# Patient Record
Sex: MALE | Race: WHITE | Employment: FULL TIME | URBAN - METROPOLITAN AREA
[De-identification: names, ages, dates, MRNs, and addresses within clinical notes are randomized per-mention and may not be internally consistent; named-entity substitution may affect disease eponyms.]

---

## 2017-08-18 LAB
CHOLESTEROL, TOTAL: NORMAL MG/DL
CHOLESTEROL/HDL RATIO: NORMAL
HDLC SERPL-MCNC: NORMAL MG/DL (ref 35–70)
LDL CHOLESTEROL CALCULATED: NORMAL MG/DL (ref 0–160)
TRIGL SERPL-MCNC: NORMAL MG/DL
VLDLC SERPL CALC-MCNC: NORMAL MG/DL

## 2019-05-08 ENCOUNTER — OFFICE VISIT (OUTPATIENT)
Dept: INTERNAL MEDICINE CLINIC | Age: 49
End: 2019-05-08
Payer: COMMERCIAL

## 2019-05-08 VITALS
HEIGHT: 63 IN | BODY MASS INDEX: 24.7 KG/M2 | RESPIRATION RATE: 14 BRPM | SYSTOLIC BLOOD PRESSURE: 120 MMHG | DIASTOLIC BLOOD PRESSURE: 80 MMHG | WEIGHT: 139.4 LBS | HEART RATE: 80 BPM

## 2019-05-08 DIAGNOSIS — M21.41 PES PLANUS OF BOTH FEET: ICD-10-CM

## 2019-05-08 DIAGNOSIS — F41.9 ANXIETY: ICD-10-CM

## 2019-05-08 DIAGNOSIS — J30.1 NON-SEASONAL ALLERGIC RHINITIS DUE TO POLLEN: ICD-10-CM

## 2019-05-08 DIAGNOSIS — Z13.31 POSITIVE DEPRESSION SCREENING: ICD-10-CM

## 2019-05-08 DIAGNOSIS — G50.0 TRIGEMINAL NEURALGIA OF LEFT SIDE OF FACE: Primary | ICD-10-CM

## 2019-05-08 DIAGNOSIS — D22.9 ATYPICAL NEVUS: ICD-10-CM

## 2019-05-08 DIAGNOSIS — R73.9 HYPERGLYCEMIA: ICD-10-CM

## 2019-05-08 DIAGNOSIS — Z11.4 SCREENING FOR HIV WITHOUT PRESENCE OF RISK FACTORS: ICD-10-CM

## 2019-05-08 DIAGNOSIS — M21.42 PES PLANUS OF BOTH FEET: ICD-10-CM

## 2019-05-08 DIAGNOSIS — F32.0 CURRENT MILD EPISODE OF MAJOR DEPRESSIVE DISORDER, UNSPECIFIED WHETHER RECURRENT (HCC): ICD-10-CM

## 2019-05-08 PROCEDURE — G0444 DEPRESSION SCREEN ANNUAL: HCPCS | Performed by: INTERNAL MEDICINE

## 2019-05-08 PROCEDURE — 99215 OFFICE O/P EST HI 40 MIN: CPT | Performed by: INTERNAL MEDICINE

## 2019-05-08 PROCEDURE — G8427 DOCREV CUR MEDS BY ELIG CLIN: HCPCS | Performed by: INTERNAL MEDICINE

## 2019-05-08 PROCEDURE — G8431 POS CLIN DEPRES SCRN F/U DOC: HCPCS | Performed by: INTERNAL MEDICINE

## 2019-05-08 PROCEDURE — G8420 CALC BMI NORM PARAMETERS: HCPCS | Performed by: INTERNAL MEDICINE

## 2019-05-08 PROCEDURE — 1036F TOBACCO NON-USER: CPT | Performed by: INTERNAL MEDICINE

## 2019-05-08 RX ORDER — FLUTICASONE PROPIONATE 50 MCG
2 SPRAY, SUSPENSION (ML) NASAL DAILY
Qty: 1 BOTTLE | Refills: 11 | Status: SHIPPED | OUTPATIENT
Start: 2019-05-08 | End: 2019-06-19

## 2019-05-08 SDOH — HEALTH STABILITY: MENTAL HEALTH: HOW OFTEN DO YOU HAVE A DRINK CONTAINING ALCOHOL?: 2-4 TIMES A MONTH

## 2019-05-08 ASSESSMENT — ENCOUNTER SYMPTOMS
BACK PAIN: 1
SHORTNESS OF BREATH: 0
DIARRHEA: 0
COUGH: 0
NAUSEA: 0
SINUS PRESSURE: 1
RHINORRHEA: 0
SORE THROAT: 0
TROUBLE SWALLOWING: 0
ABDOMINAL PAIN: 0

## 2019-05-08 ASSESSMENT — PATIENT HEALTH QUESTIONNAIRE - PHQ9
7. TROUBLE CONCENTRATING ON THINGS, SUCH AS READING THE NEWSPAPER OR WATCHING TELEVISION: 1
1. LITTLE INTEREST OR PLEASURE IN DOING THINGS: 3
SUM OF ALL RESPONSES TO PHQ QUESTIONS 1-9: 13
3. TROUBLE FALLING OR STAYING ASLEEP: 2
4. FEELING TIRED OR HAVING LITTLE ENERGY: 3
SUM OF ALL RESPONSES TO PHQ9 QUESTIONS 1 & 2: 6
SUM OF ALL RESPONSES TO PHQ QUESTIONS 1-9: 13
8. MOVING OR SPEAKING SO SLOWLY THAT OTHER PEOPLE COULD HAVE NOTICED. OR THE OPPOSITE, BEING SO FIGETY OR RESTLESS THAT YOU HAVE BEEN MOVING AROUND A LOT MORE THAN USUAL: 0
10. IF YOU CHECKED OFF ANY PROBLEMS, HOW DIFFICULT HAVE THESE PROBLEMS MADE IT FOR YOU TO DO YOUR WORK, TAKE CARE OF THINGS AT HOME, OR GET ALONG WITH OTHER PEOPLE: 1
9. THOUGHTS THAT YOU WOULD BE BETTER OFF DEAD, OR OF HURTING YOURSELF: 0
5. POOR APPETITE OR OVEREATING: 0
6. FEELING BAD ABOUT YOURSELF - OR THAT YOU ARE A FAILURE OR HAVE LET YOURSELF OR YOUR FAMILY DOWN: 1
2. FEELING DOWN, DEPRESSED OR HOPELESS: 3

## 2019-05-08 NOTE — PROGRESS NOTES
[address] [address]  Dept: [phone]  Dept Fax: [phone]  Loc: [phone]     2019    Zeke Lord (:  1970) is a 53 y.o. male, here for evaluation of the following medical concerns:    Chief Complaint   Patient presents with   Lul[facility] Goodson Doctor       HPI     Changing from Dr. Jacquie Dimas due to insurance reasons. Continues with depressed mood and anxiety. See PHQ9. Job situation is somewhat unstable. 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 Feels like \"his life is not his own. \" Lives for others like his elderly parents. Siblings out of town count on him to do this. Lives alone, not lots of social connections except a few close friends. Took antidepressant medication 15 years ago. Does not think it will help him but open to talk therapy and possible medication in the future. Had \"prostate issues\" but doing better now. But he is not sure of next steps. Saw Urology group- Dr Chavez Grade. Right lower cheek numbness for long time that comes and goes. Worse w stress. Had negative evaluation with Brain and C spine MRI but symptoms persist.    HO melanoma in situ left flank. Has some newish moles he wants evaluated by Dermatology. Also numbness plantar surface both great toes. Worse with certain shoes. Notes back pain when wears his dress shoes. Review of Systems   Constitutional: Negative for fatigue and fever. HENT: Positive for congestion and sinus pressure (sinus pressure in cheeks). Negative for rhinorrhea, sore throat and trouble swallowing. Eyes: Negative for visual disturbance. Respiratory: Negative for cough and shortness of breath. Cardiovascular: Negative for chest pain and palpitations. Gastrointestinal: Negative for abdominal pain, diarrhea and nausea. Genitourinary: Negative for decreased urine volume, dysuria and frequency. External ear normal.   Left Ear: External ear normal.   Nose: Nose normal.   Mouth/Throat: Oropharynx is clear and moist.   Eyes: Pupils are equal, round, and reactive to light. EOM are normal. No scleral icterus. Neck: Normal range of motion. No thyromegaly present. Cardiovascular: Normal rate, regular rhythm and intact distal pulses. No murmur heard. Pulmonary/Chest: Breath sounds normal. No respiratory distress. He has no wheezes. He has no rales. Abdominal: Soft. Bowel sounds are normal. He exhibits no distension. There is no tenderness. Musculoskeletal: Normal range of motion. He exhibits no edema or deformity. Flat feet  No foot lesions noted of great toes     Lymphadenopathy:     He has no cervical adenopathy. Neurological: He is alert and oriented to person, place, and time. A cranial nerve deficit (reports intermittent reduced sensation right lower cheek) is present. No sensory deficit. Coordination normal.   Skin: Skin is warm and dry. Several brown nevi, some raised and irregular borders   Psychiatric: He has a normal mood and affect. Thought content normal.   Nursing note and vitals reviewed. ASSESSMENT/PLAN:  1. Current mild episode of major depressive disorder, unspecified whether recurrent [facility])  Patient agrees to see AV office psychologist. He will let me know if not improving and consider medication at that time. 2. Positive depression screening    - Positive Screen for Clinical Depression with a Documented Follow-up Plan     3. Anxiety  He will try to manage this with exercise and speaking to his sisters about helping care for elderly parents. 4. Trigeminal neuralgia of left side of face  MRI's of c spine and brain reviewed, without clinically relevant findings. Refer to - Robert More MD, Otolaryngology, [facility] to evaluate for possible trigeminal neuralgia as patient continues to be symptomatic. 5. Atypical nevus  - [facility] Dermatology    6. Hyperglycemia    - Hemoglobin A1C; Future    7. Screening for HIV without presence of risk factors    - HIV Screen; Future    8. Non-seasonal allergic rhinitis due to pollen  Patient can try otc loratadine and   - fluticasone (FLONASE) 50 MCG/ACT nasal spray; 2 sprays by Nasal route daily  Dispense: 1 Bottle; Refill: 11    9. Pes planus of both feet  Suspect this contributes to his toe numbness and back pain. - Kat Hidalgo DPM, Podiatry, [facility]    Return in about 1 year (around 5/8/2020) for routine. minutes spent with patient- >50 % continuity of care and/or counseling. On the basis of positive PHQ-9 screening (PHQ-9 Total Score: 13), the following plan was implemented: referral for psychotherapy provided to address external stressors. Patient will follow-up in as needed depending on response to therapy. Aishwarya Case

## 2019-05-08 NOTE — PATIENT INSTRUCTIONS
Call Urology group to see what follow up plan is.     Dermatology   ENT    OTC Loratadine for allergies and Flonase    Make appointment with [address] [place] [address]

## 2019-05-13 ENCOUNTER — OFFICE VISIT (OUTPATIENT)
Dept: ORTHOPEDIC SURGERY | Age: 49
End: 2019-05-13
Payer: COMMERCIAL

## 2019-05-13 VITALS
DIASTOLIC BLOOD PRESSURE: 73 MMHG | HEIGHT: 63 IN | BODY MASS INDEX: 24.69 KG/M2 | WEIGHT: 139.33 LBS | SYSTOLIC BLOOD PRESSURE: 129 MMHG | HEART RATE: 77 BPM

## 2019-05-13 DIAGNOSIS — R20.0 NUMBNESS AND TINGLING OF FOOT: Primary | ICD-10-CM

## 2019-05-13 DIAGNOSIS — R20.2 NUMBNESS AND TINGLING OF FOOT: Primary | ICD-10-CM

## 2019-05-13 PROCEDURE — G8427 DOCREV CUR MEDS BY ELIG CLIN: HCPCS | Performed by: PODIATRIST

## 2019-05-13 PROCEDURE — 99203 OFFICE O/P NEW LOW 30 MIN: CPT | Performed by: PODIATRIST

## 2019-05-13 PROCEDURE — G8420 CALC BMI NORM PARAMETERS: HCPCS | Performed by: PODIATRIST

## 2019-05-13 PROCEDURE — 1036F TOBACCO NON-USER: CPT | Performed by: PODIATRIST

## 2019-05-13 NOTE — PROGRESS NOTES
HISTORY OF PRESENT ILLNESS: This is an initial visit for a 81-year-old male who presents with the chief complaint of occasional numbness to the right and left great toes. This happens sporadically and seems to be resolved with simple rest.  It seems to occur more when he is on his feet more. He does complain of lower back discomfort and occasional numbness higher up in his leg. FAMILY HISTORY: Documented in chart. SOCIAL HISTORY: Documented in chart. REVIEW OF SYSTEMS:  The patient denies any issues with dermatologic, pulmonary, cardiovascular, genitourinary, hematologic, gastrointestinal, neurologic, psychiatric, and HEENT systems. Family History, Social History, and Review of Systems were reviewed from patient history form dated on 5/13/2019 and available in the patient's chart under the MEDIA tab. PHYSICAL EXAMINATION:     The patient is alert and orientated x3. There is no erythema or ecchymosis or edema bilateral.  No foot ulcerations or pre-ulcerative regions are noted. He has readily palpable DP and PT pulses bilateral.    His sensation is intact to 5.07 g monofilament and multiple plantar sites of both feet. There is no evidence of muscle weakness especially with dorsiflexion at the ankle bilateral.    The remainder of the exam is unremarkable. RADIOGRAPHS: None taken      ASSESSMENT: Radiculopathy      PLAN: The patient was educated on the pathology and its treatment options. I suspect that his symptoms are actually coming from his back. Unfortunately, there is no exact can offer him to resolve the sporadic numbness. I reassured him that is most likely a sensory neuropathy.   I recommended he seek an opinion from one of our lower back pain specialist.

## 2019-05-14 ENCOUNTER — TELEPHONE (OUTPATIENT)
Dept: INTERNAL MEDICINE CLINIC | Age: 49
End: 2019-05-14

## 2019-05-14 DIAGNOSIS — Z13.220 SCREENING, LIPID: Primary | ICD-10-CM

## 2019-05-14 NOTE — TELEPHONE ENCOUNTER
Patient states his my chart account states he is due for a Cholesterol Screening. Patient just established care with Dr. Migel Whitmna 05-08, and is requesting an order please be generated for a cholesterol screening. Please verify with patient @ phone # provided.

## 2019-05-15 NOTE — TELEPHONE ENCOUNTER
Tell patient I pulled in non [facility] results done 8/17 so not really \"due\" yet. If still wants it done, see order. Last labs were okay.     Chol/HDL Ratio 4.2 0 - 5    Cholesterol 190   Comment:  TOTAL CHOLESTEROL INTERPRETATION:  Less than 200 mg/dL Desireable  200-239 mg/dL Borderline  Greater or Equal to 240 mg/dL High 125 - 199 mg/dL   LDL Calculated 123 (H)   Comment:  LDL CHOLESTEROL INTERPRETATION:    Less than 100 mg/dL Optimal  100-129 mg/dL Near optimal/above optimal  130-159 mg/dL Borderline High  160-189 mg/dL High  Greater or Equal to 190 mg/dL Very High 0 - 100 mg/dL   HDL 45   Comment:  HDL CHOLESTEROL INTERPRETATION:    Less than 40 mg/dL Low  Greater than 60 mg/dL Desirable 40 - 180 mg/dL   Triglycerides 109   Comment:  TOTAL TRIGLYCERIDE INTERPRETATION:    Less than 150 mg/dL Normal  150-199 mg/dL Borderline HIgh  200-499 mg/dL High  Greater or Equal to 500 mg/dL Very High 0 - 150 mg/dL   Specimen Collected on   PLASMA 8/18/2017 7:49 AM

## 2019-05-15 NOTE — TELEPHONE ENCOUNTER
Discussed with patient and understood he not \"due\" until 2022. Did advise lab order is in chart if he decides he would like to have them redrawn.

## 2019-05-16 ENCOUNTER — OFFICE VISIT (OUTPATIENT)
Dept: ORTHOPEDIC SURGERY | Age: 49
End: 2019-05-16
Payer: COMMERCIAL

## 2019-05-16 VITALS — HEIGHT: 63 IN | BODY MASS INDEX: 24.69 KG/M2 | WEIGHT: 139.33 LBS

## 2019-05-16 VITALS — BODY MASS INDEX: 24.69 KG/M2 | WEIGHT: 139.33 LBS | HEIGHT: 63 IN

## 2019-05-16 DIAGNOSIS — M79.642 PAIN IN BOTH HANDS: ICD-10-CM

## 2019-05-16 DIAGNOSIS — M79.641 PAIN IN BOTH HANDS: ICD-10-CM

## 2019-05-16 DIAGNOSIS — M54.50 PAIN OF LUMBAR SPINE: ICD-10-CM

## 2019-05-16 DIAGNOSIS — M79.601 PAIN IN BOTH UPPER EXTREMITIES: Primary | ICD-10-CM

## 2019-05-16 DIAGNOSIS — M51.36 DEGENERATIVE DISC DISEASE, LUMBAR: Primary | ICD-10-CM

## 2019-05-16 DIAGNOSIS — M79.602 PAIN IN BOTH UPPER EXTREMITIES: Primary | ICD-10-CM

## 2019-05-16 DIAGNOSIS — M47.816 SPONDYLOSIS WITHOUT MYELOPATHY OR RADICULOPATHY, LUMBAR REGION: ICD-10-CM

## 2019-05-16 DIAGNOSIS — M79.645 FINGER PAIN, LEFT: Primary | ICD-10-CM

## 2019-05-16 DIAGNOSIS — R20.2 NUMBNESS AND TINGLING: ICD-10-CM

## 2019-05-16 DIAGNOSIS — R20.0 NUMBNESS AND TINGLING: ICD-10-CM

## 2019-05-16 PROCEDURE — G8420 CALC BMI NORM PARAMETERS: HCPCS | Performed by: PHYSICAL MEDICINE & REHABILITATION

## 2019-05-16 PROCEDURE — G8420 CALC BMI NORM PARAMETERS: HCPCS | Performed by: ORTHOPAEDIC SURGERY

## 2019-05-16 PROCEDURE — 95912 NRV CNDJ TEST 11-12 STUDIES: CPT | Performed by: PHYSICAL MEDICINE & REHABILITATION

## 2019-05-16 PROCEDURE — 99243 OFF/OP CNSLTJ NEW/EST LOW 30: CPT | Performed by: PHYSICAL MEDICINE & REHABILITATION

## 2019-05-16 PROCEDURE — 95886 MUSC TEST DONE W/N TEST COMP: CPT | Performed by: PHYSICAL MEDICINE & REHABILITATION

## 2019-05-16 PROCEDURE — 99203 OFFICE O/P NEW LOW 30 MIN: CPT | Performed by: ORTHOPAEDIC SURGERY

## 2019-05-16 PROCEDURE — G8427 DOCREV CUR MEDS BY ELIG CLIN: HCPCS | Performed by: ORTHOPAEDIC SURGERY

## 2019-05-16 PROCEDURE — G8427 DOCREV CUR MEDS BY ELIG CLIN: HCPCS | Performed by: PHYSICAL MEDICINE & REHABILITATION

## 2019-05-16 PROCEDURE — 1036F TOBACCO NON-USER: CPT | Performed by: ORTHOPAEDIC SURGERY

## 2019-05-16 NOTE — PROGRESS NOTES
weakness:  no   Difficulty walking:  no    Recent Imaging (within past one year)   Xrays: no   MRI or CT of spine: no    Current/Past Treatment:   · Physical Therapy:  none  · Chiropractic:  none  · Injection:  none  · Medications:   NSAIDS:  yes Tylenol or Ibuprofen    Muscle relaxer:  none   Steriods:  none   Neuropathic medications:  none   Opioids:  none  · Previous surgery:  no  · Previous surgical consult:  no  · Other:  · Infection control  · Tested positive for MRSA in past 12 months:  no  · Tested positive for MSSA \"staph infection\" in past 12 months: no  · Tested positive for VRE (Vancomycin Resistant Enterococci) in past 12 months:   no  · Currently on any antibiotics for an infection: no  · Anticoagulants:  · On a blood thinner:  no   · Any history of bleeding disorder: no   · MRI Contraindication: no   · Previous Pain Management: no                   Past Medical History:   Past Medical History:   Diagnosis Date    Anxiety     Melanoma in situ ([facility] [address]     left flank    Prostate calculus     Saw Urology group- Dr. Ronald Babinski? - had mri 1/19 and \"scope\" in 9/18      Past Surgical History:     Past Surgical History:   Procedure Laterality Date    CYSTOSCOPY  2018     Current Medications:     Current Outpatient Medications:     fluticasone (FLONASE) 50 MCG/ACT nasal spray, 2 sprays by Nasal route daily, Disp: 1 Bottle, Rfl: 11  Allergies:  Codeine and Penicillins  Social History:    reports that he has never smoked. He has never used smokeless tobacco. He reports that he drinks alcohol. Family History:   Family History   Problem Relation Age of Onset    Scoliosis Mother     Hypertension Father     Breast Cancer Sister     Diabetes type 2  Paternal Grandfather          REVIEW OF SYSTEMS: Full ROS reviewed & scanned from 5/16/2019           PHYSICAL EXAM:    Vitals: Height 5' 3.27\" (1.607 m), weight 139 lb 5.3 oz (63.2 kg).     GENERAL EXAM:  · General Apparence: Patient is adequately groomed with no feet.  · Gait & station: normal, patient ambulates without assistance  · Additional Examinations:  · RIGHT LOWER EXTREMITY: Inspection/examination of the right lower extremity does not show any tenderness, deformity or injury. Range of motion is unremarkable. There is no gross instability. There are no rashes, ulcerations or lesions. Strength and tone are normal. No atrophy or abnormal movements are noted. · LEFT LOWER EXTREMITY:  Inspection/examination of the left lower extremity does not show any tenderness, deformity or injury. Range of motion is unremarkable. There is no gross instability. There are no rashes, ulcerations or lesions. Strength and tone are normal. No atrophy or abnormal movements are noted. Diagnostic Testing:    Xrays:   AP and lateral of the lumbar spine taken today in the office show mild L5-S1 degenerative disc disease and a transitional segment which is labeled S1  MRI or CT:  None  EMG:  None  Results for orders placed or performed in visit on 05/14/19   Lipid Panel   Result Value Ref Range    Cholesterol, Total  mg/dL    HDL  35 - 70 mg/dL    LDL Calculated  0 - 160 mg/dL    Triglycerides  mg/dL    Chol/HDL Ratio      VLDL  mg/dL       Impression (Medical Decision Making):       1. Degenerative disc disease, lumbar    2. Spondylosis without myelopathy or radiculopathy, lumbar region    3. Pain of lumbar spine        Plan (Medical Decision Making):    I discussed the diagnosis and the treatment options with Hema Roach today. In Summary:  The various treatment options were outlined and discussed with Hema Roach including:  Conservative care options: physical therapy, ice, medications, bracing, and activity modification. The indications for therapeutic injections. The indications for additional imaging/laboratory studies. The indications for (possible future) interventions.      After considering the various options discussed, Hema Roach elected to pursue a course of treatment that includes the followin. Medications: Continue anti-inflammatories with appropriate GI Precautions including to stop if develop dark tarry stools or GI upset and to take with food. 2. PT:  I will start the patient on a trial of PT to work on a lumbar stabilization program to focus on core strengthening, core stabilizing, lumbar stretches, hamstring flexibility, modalities as indicated for 6-8 visits over the next 4-6 weeks. 3. Further studies: MR if no benefit from PT      4. Interventional:  At this point, no interventional options are recommended. 5. Healthy Lifestyle Measures:  Patient education material reviewing the following was distributed to [employer]  Anatomic drawings  Healthy lifestyle education  Osteoporosis prevention,   Back and neck pain educational information   Advanced imaging preparedness    Posture education   Proper lifting and carrying techniques,   Weight management  Quitting smoking and   Minor ways to treat back pain  For further information regarding the spine conditions and to review interventional treatments the patient was directed to [employer].    6.  Follow up:  4-6 weeks    [employer] was instructed to call the office if his symptoms worsen or if new symptoms appear prior to the next scheduled visit. He is specifically instructed to contact the office between now & his scheduled appointment if he has concerns related to his condition or if he needs assistance in scheduling the above tests. He is welcome to call for an appointment sooner if he has any additional concerns or questions. Hossein Nielson. Moe Nice MD, [place], [facility]  Board Certified in [address] [place] [address] Certified and Fellowship Trained in [facility])     This dictation was performed with a verbal recognition program [facility]) and it was checked for errors.  It is possible that there are still dictated errors within this office note. If so, please bring any errors to my attention for an addendum. All efforts were made to ensure that this office note is accurate.

## 2019-05-16 NOTE — PROGRESS NOTES
[address]  MEDICINE      Patient: Melisa Bravo Age: 53 y   Sex: Male Date: 5/16/2019   YOB: 1970 Ref. Phys: Mateusz Shah MD   Notes:         Patient History: Mr Rosita Briggs presents with paresthesia in the left arm and cold intolerance and numbness in the left middle and ring fingers. Motor Nerve Conduction:    Nerve and Site Onset Latency Amplitude Distance Conduction  Velocity   Median. L       Wrist 3.5 ms 11.6 mV 80 mm  m/s   Elbow 8.2 ms 11.7 mV 260 mm 55 m/s     Ulnar. L       Wrist 2.7 ms 13.1 mV 80 mm  m/s   Below elbow 6.0 ms 11.5 mV 200 mm 61 m/s   Above elbow 8.4 ms 11.4 mV 110 mm 46 m/s     Median. R       Wrist 3.6 ms 11.8 mV 80 mm  m/s   Elbow 7.8 ms 10.7 mV 240 mm 57 m/s     Ulnar. R       Wrist 2.8 ms 12.2 mV 80 mm  m/s   Below elbow 6.3 ms 11.0 mV 200 mm 57 m/s   Above elbow 8.7 ms 10.2 mV 130 mm 54 m/s           Sensory Nerve Conduction:    Nerve and Site Peak  Latency Peak  Amplitude Distance Conduction  Velocity   Median. L       Wrist (Median) 1.9 ms 152 mV 80 mm 57 m/s     Ulnar. L       Wrist (Ulnar) 1.5 ms 28 mV  mm  m/s     ms  mV 80 mm 80 m/s     Radial.L       Forearm 2.1 ms 33 mV 100 mm 77 m/s     Median. R       Wrist (Median) 1.8 ms 121 mV 80 mm 62 m/s     Ulnar. R       Wrist (Ulnar) 1.3 ms 28 mV  mm  m/s     ms  mV 80 mm 87 m/s     Radial.R       Forearm 2.1 ms 37 mV 100 mm 63 m/s         Needle EMG Examination:     Insertional Spontaneous Activity Volitional MUAPs   Muscle Insertional Fibs +Wave Fasc Duration Amplitude Poly Config Recruitment Pattern   Deltoid. L Normal None None None Normal Normal None Normal Normal Full   Biceps Brachii. L Normal None None None Normal Normal None Normal Normal Full   Triceps. L Normal None None None Normal Normal None Normal Normal Full   Pronator Teres. L Normal None None None Normal Normal None Normal Normal Full   FDI. L Normal None None None Normal Normal None Normal Reduced Full Cervical paraspinals - Lower. L Normal None None None           Nerve Conduction Studies - Left Upper Extremity  The median motor nerve conduction study shows normal latency, normal amplitude and normal conduction velocity. The ulnar motor nerve conduction study shows normal latency, normal amplitude and normal conduction velocity distally with a 15 m/s drop across the elbow segment. The orthodromic median sensory NCS shows normal peak latency and amplitude. The orthodromic ulnar sensory NCS shows normal peak latency and amplitude. The antidromic superficial radial sensory NCS shows normal peak latency and amplitude    Needle EMG Examination:     Insertional Spontaneous Activity Volitional MUAPs   Muscle Insertional Fibs +Wave Fasc Duration Amplitude Poly Config Recruitment Pattern   Deltoid. R Normal None None None Normal Normal None Normal Normal Full   Biceps Brachii. R Normal None None None Normal Normal None Normal Normal Full   Triceps. R Normal None None None Normal Normal None Normal Normal Full   Pronator Teres. R Normal None None None Normal Normal None Normal Normal Full   FDI. R Normal None None None Normal Normal None Normal Normal Full   Cervical paraspinals - Lower. R Normal None None None             Nerve Conduction Studies - Right Upper Extremity  The median motor nerve conduction study shows normal latency, normal amplitude and normal conduction velocity. The ulnar motor nerve conduction study shows normal latency, normal amplitude and normal conduction velocity. The orthodromic median sensory NCS shows normal peak latency and amplitude. The orthodromic ulnar sensory NCS shows normal peak latency and amplitude. The antidromic superficial radial sensory NCS shows normal peak latency and amplitude. Impression:   1.  Electrodiagnostic studies suggestive of a left ulnar neuropathy across the elbow with a 15 m/s drop across the elbow segment of the ulnar motor nerve study and mild reduced recruitment in the left FDI. 2. There is no electrodiagnostic evidence of a median or right ulnar neuropathy, peripheral neuropathy or cervical motor radiculopathy in the bilateral upper extremities. Carlene Marcial.  Megan Quarles MD.

## 2019-05-16 NOTE — PROGRESS NOTES
Assessment: 23year-old amputation of the tip of the left ring finger but he is also getting some numbness in middle and ring fingers as well as cold intolerance    Treatment Plan: Clinically I think he does have mild carpal tunnel syndrome with positive Tinel's and Phalen's on the left side. We also talked about the possibility of revising the flap was it's quite bulbous on the tip of his finger but I'm not sure that this is bothering him enough to warrant this. We also discussed his cold intolerance issue which I think unfortunately is just hyperreactivity of the nerves we talked about minimizing caffeine and keeping it warm but other than that I don't see any signs of vascular insufficiency    Return for after EMG. Chief Complaint:  Hand Pain (25 years ago he had a saw accident and injured his left ring finger, he hasn't had it looked at since it happened. He gets stiffness and some numbness in the finger.)      History of Present Illness  Chidi Carter is a 53 y.o. male. 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 He had not had anybody evaluated for many years and so he came in for evaluation today.   His complaints are mainly cold intolerance as well as numbness in the middle and ring fingers on the left hand and on both hands he feels like he just doesn't have quite the strength for  and the flexibility that he used to have Severity: Moderate Duration: Progressive over the last couple of years Modifying factors: He does not have numbness at night or while driving a car keeping his finger warm helps  Associated symptoms: No associated neck pain    Medical History    Current Outpatient Medications on File Prior to Visit   Medication Sig Dispense Refill    fluticasone (FLONASE) 50 MCG/ACT nasal spray 2 sprays by Nasal route daily 1 Bottle 11     No current facility-administered medications on file prior to visit. Past Medical History:   Diagnosis Date    Anxiety     Melanoma in situ [facility])     left flank    Prostate calculus     Saw Urology group- Dr. Rosenberg Ing? - had mri 1/19 and \"scope\" in 9/18     Allergies   Allergen Reactions    Codeine      Hives     Penicillins      [unfilled]  Family History   Problem Relation Age of Onset    Scoliosis Mother     Hypertension Father     Breast Cancer Sister     Diabetes type 2  Paternal Grandfather        Patient's medications, allergies, past medical, surgical, social and family histories were reviewed and updated as appropriate. Review of Systems  Pertinent items are noted in HPI  Denies fever chills, confusion and bowel and bladder active change  Complete Review of Systems reviewed from patient history form dated 5/16/19 and available in the patients chart under the media tab. Vital Signs  Vitals:    05/16/19 0825   Weight: 139 lb 5.3 oz (63.2 kg)   Height: 5' 3.27\" (1.607 m)     Body mass index is 24.47 kg/m². Physical Exam  Constitutional:  Patient is well-nourished and demonstrates normal hygiene. Mental Status:  Patient is alert and oriented to person, place and time. Skin:  No rashes or erythema  Lymphatic: No supraclavicular or cervical adenopathy. Right Hand Examination:  Inspection:  No objective swelling  Finger Range of Motion:  Full  Wrist Range of Motion:  Normal  Vascular Exam:  Normal capillary refill   Neurologic Exam: Minimally positive Tinel's and Phalen's  Intrinsic Muscle Strength:  Normal  Extrinsic Muscle Strength: Normal  Special Tests:      Left Hand Examination:  Inspection:  Amputation of the tip of the ring finger through the base of the distal phalanx with a somewhat bulbous dorsal tip  Finger Range of Motion:  Normal no DIP flexion secondary to the level of amputation  Wrist Range of Motion:  Normal  Vascular Exam:  Radial and ulnar arterial pulses are normal.  Emerson's Test reveals patent palmar arch.     Neurologic Exam: Positive Tinel's and Phalen's  Intrinsic Muscle Strength:  Normal  Extrinsic Muscle Strength: Normal  Special Tests:  Digital Emerson's test is normal    Neck Exam:  There is no paraspinous tenderness. Neck compression and neck tilt test are negative. No significant pain. Additional Comments:     Additional Examinations:  X-Ray Findings:  PA lateral oblique x-rays of the left ring finger show amputation through the base of the distal phalanx there are calcifications in the bulbous skin flap dorsally  Additional Diagnostic Test Findings:    Office Procedures:      I spent 30 minutes, face to face, and greater than 50% was spent counseling with the patient discussing treatment options and answering questions regarding revision amputation of this ring finger and with the expected outcome would be. We also discussed the pathology of carpal tunnel syndrome and now relate to his numbness and tingling. I also discussed cold intolerance after traumatic injury to the digit. This dictation was performed with a verbal recognition program. It is possible that there are still dictated errors within this office note. All efforts were made to ensure that this office note is accurate. Orders Placed This Encounter   Procedures    XR FINGER LEFT (MIN 2 VIEWS)     Standing Status:   Future     Number of Occurrences:   1     Standing Expiration Date:   5/16/2020    EMG     Standing Status:   Future     Standing Expiration Date:   5/15/2020     Order Specific Question:   Which body part?      Answer:   EMG/NCV YANCY

## 2019-05-23 ENCOUNTER — OFFICE VISIT (OUTPATIENT)
Dept: ENT CLINIC | Age: 49
End: 2019-05-23
Payer: COMMERCIAL

## 2019-05-23 ENCOUNTER — OFFICE VISIT (OUTPATIENT)
Dept: ORTHOPEDIC SURGERY | Age: 49
End: 2019-05-23
Payer: COMMERCIAL

## 2019-05-23 VITALS
SYSTOLIC BLOOD PRESSURE: 116 MMHG | HEART RATE: 89 BPM | DIASTOLIC BLOOD PRESSURE: 64 MMHG | WEIGHT: 137 LBS | BODY MASS INDEX: 24.27 KG/M2 | TEMPERATURE: 98.6 F | HEIGHT: 63 IN

## 2019-05-23 VITALS — WEIGHT: 139.33 LBS | RESPIRATION RATE: 16 BRPM | BODY MASS INDEX: 24.69 KG/M2 | HEIGHT: 63 IN

## 2019-05-23 DIAGNOSIS — R20.0 RIGHT FACIAL NUMBNESS: Primary | ICD-10-CM

## 2019-05-23 DIAGNOSIS — G56.22 ULNAR NEUROPATHY OF LEFT UPPER EXTREMITY: ICD-10-CM

## 2019-05-23 PROCEDURE — 1036F TOBACCO NON-USER: CPT | Performed by: ORTHOPAEDIC SURGERY

## 2019-05-23 PROCEDURE — G8420 CALC BMI NORM PARAMETERS: HCPCS | Performed by: OTOLARYNGOLOGY

## 2019-05-23 PROCEDURE — G8427 DOCREV CUR MEDS BY ELIG CLIN: HCPCS | Performed by: OTOLARYNGOLOGY

## 2019-05-23 PROCEDURE — G8427 DOCREV CUR MEDS BY ELIG CLIN: HCPCS | Performed by: ORTHOPAEDIC SURGERY

## 2019-05-23 PROCEDURE — G8420 CALC BMI NORM PARAMETERS: HCPCS | Performed by: ORTHOPAEDIC SURGERY

## 2019-05-23 PROCEDURE — 99213 OFFICE O/P EST LOW 20 MIN: CPT | Performed by: ORTHOPAEDIC SURGERY

## 2019-05-23 PROCEDURE — 99243 OFF/OP CNSLTJ NEW/EST LOW 30: CPT | Performed by: OTOLARYNGOLOGY

## 2019-05-23 NOTE — PROGRESS NOTES
Assessment: Very mild ulnar neuropathy at the left elbow by EMG criteria. I think the cold intolerance that he gets where he amputated the tip of his ring finger 25 years ago is just from the direct trauma as I don't see any evidence of vascular insufficiency. Treatment Plan: We discussed protective measures for mild ulnar neuropathy and what symptomatology would indicate the need for further intervention. Had lots of questions today and we spent a good deal of time discussing his traumatic amputation and cold intolerance as well as neuropathic processes. One further complicating factor is he had shingles in the past which didn't affect his left upper extremity and part of this could be a mild postherpetic syndrome    Return if symptoms worsen or fail to improve. Chief Complaint:  Results (EMG results BUE)      History of Present Illness  Emmanuel Yuan is a 53 y.o. male. Location: Left hand Severity: He gets cold intolerance in the wintertime and the ring finger where he amputated it and intermittently gets some mild numbness he tends to feel vibration more in this left hand Duration: Amputation was 25 years ago Modifying factors: When he is doing a lot of gripping with this hand bothers him worse  Associated symptoms: No associated neck pain as noted above he did have shingles affecting this left side of his upper extremity and chest wall    Medical History    Current Outpatient Medications on File Prior to Visit   Medication Sig Dispense Refill    fluticasone (FLONASE) 50 MCG/ACT nasal spray 2 sprays by Nasal route daily 1 Bottle 11     No current facility-administered medications on file prior to visit.       Past Medical History:   Diagnosis Date    Anxiety     Melanoma in situ [facility])     left flank    Prostate calculus     Saw Urology group- Dr. Keke Locke? - had mri 1/19 and \"scope\" in 9/18     Allergies   Allergen Reactions    Codeine      Hives     Penicillins      [unfilled]  Family Neck compression and neck tilt test are negative. No significant pain. Additional Comments:     Additional Examinations:  X-Ray Findings:    Additional Diagnostic Test Findings: EMG nerve conduction studies a been performed they are normal distally with no evidence of carpal tunnel syndrome. There is a 15 mm slowing across the ulnar nerve segment at the left elbow no motor unit changes    Office Procedures:      I spent 20 minutes, face to face, and greater than 50% was spent counseling with the patient discussing treatment options and answering questions regarding ulnar neuropathy as well as cold intolerance after amputation. We discussed the protective measures for ulnar nerve symptoms and what operative indications would be    This dictation was performed with a verbal recognition program. It is possible that there are still dictated errors within this office note. All efforts were made to ensure that this office note is accurate. No orders of the defined types were placed in this encounter.

## 2019-05-23 NOTE — PROGRESS NOTES
CHIEF COMPLAINT: Numbness in the right cheek    HISTORY OF PRESENT ILLNESS:  53 y.o. male who presents with intermittent numbness in the right cheek. First occurred 2 years ago. Intermittent, lasts for hours. Sensation of numbness occurs almost every day. Feels that the facial muscles are weak. Gets a feeling of fullness in the right ear. No nasal obstruction. No diplopea. Had negative MRI at onset of symptoms. PAST MEDICAL HISTORY:   Social History     Tobacco Use   Smoking Status Never Smoker   Smokeless Tobacco Never Used                                                    Social History     Substance and Sexual Activity   Alcohol Use Yes    Frequency: 2-4 times a month    Comment: 2 beers a week                                                     Current Outpatient Medications:     fluticasone (FLONASE) 50 MCG/ACT nasal spray, 2 sprays by Nasal route daily, Disp: 1 Bottle, Rfl: 11                                                 Past Medical History:   Diagnosis Date    Allergic rhinitis     Anxiety     Arthritis     Cancer ([address]     Melanoma on left side    Dizziness     Headache     Melanoma in situ ([address]     left flank    Nosebleed     Prostate calculus     Saw Urology group- Dr. Calli Pena? - had mri 1/19 and \"scope\" in 9/18    Rash     Tinnitus                                                     Past Surgical History:   Procedure Laterality Date    CYSTOSCOPY  2018         REVIEW OF SYSTEMS:  All pertinent positive and negative review of systems included in HPI. Otherwise, all systems are reviewed and negative. PHYSICAL EXAMINATION:   GENERAL: wdwn- no acute distress  COMMUNICATION :  Normal voice  MENTAL STATUS:  Normal  HEAD AND FACE:  Normal  EXTERNAL EARS AND NOSE:  Normal  FACIAL MUSCLES:  Normal  EXTRAOCULAR MUSCLES: Intact.   No diplopea  FACE PALPATION:  Negative  OTOSCOPY:  Normal tympanic membranes and middle ear spaces  TUNING FORKS: Rinne ++ Serrano midline at [place]de[place] Hz  INTRANASAL:  Septum to left, turbinates large, meati clear. LIPS, TEETH, GINGIVA:  Normal mucosa  PHARYNX:  Normal  NECK:  No masses or adenopathy  SALIVARY GLANDS:  Normal  THYROID:  Normal  CRANIAL NERVES:  All branches of the trigeminal nerve are intact. IMPRESSION: Asymptomatic septal deviation.       PLAN: Recommend neurologic evaluation    FOLLOW-UP: As needed

## 2019-05-29 ENCOUNTER — HOSPITAL ENCOUNTER (OUTPATIENT)
Dept: PHYSICAL THERAPY | Age: 49
Setting detail: THERAPIES SERIES
Discharge: HOME | End: 2019-05-29
Payer: COMMERCIAL

## 2019-05-29 PROCEDURE — 97161 PT EVAL LOW COMPLEX 20 MIN: CPT | Performed by: PHYSICAL THERAPIST

## 2019-05-29 PROCEDURE — 97110 THERAPEUTIC EXERCISES: CPT | Performed by: PHYSICAL THERAPIST

## 2019-05-29 PROCEDURE — 97140 MANUAL THERAPY 1/> REGIONS: CPT | Performed by: PHYSICAL THERAPIST

## 2019-05-29 NOTE — PLAN OF CARE
The [facility] and [facility]  [address], [place], [facility]  Phone: [phone]   Fax:     [phone]                                                       Kim Mesa    Dear  Referring Practitioner: Dr Tom David,    We had the pleasure of evaluating the following patient for physical therapy services at [address] [place] [address]. A summary of our findings can be found in the initial assessment below. This includes our plan of care. If you have any questions or concerns regarding these findings, please do not hesitate to contact me at the office phone number checked above. Thank you for the referral.       Physician Signature:_______________________________Date:__________________  By signing above (or electronic signature), therapists plan is approved by physician      Patient: Srini Sherman   : 1970   MRN: [id number]  Referring Physician: Referring Practitioner: Dr Tom David      Evaluation Date: 2019      Medical Diagnosis Information:  Diagnosis: Lumbar DDD   M51.36   Treatment Diagnosis: PT treatment diagnosis:  low back pain, stiffness, weakness                                         Insurance information: PT Insurance Information: Bryon Huyen   20 visits/yr,  $15 copay     Precautions/ Contra-indications: Hx of skin cancer  Latex Allergy:  [x]NO      []YES  Preferred Language for Healthcare:   [x]English       []other:    SUBJECTIVE: Patient stated complaint:  Low back pain that is ongoing for the past 2 years. No known NITIN. Began have tingling in toes of both feet L > R. Aching pain and tightness in the low back but does not radiate into the legs. Worse with standing, getting out of bed in the morning. X-rays: mild lumbar DDD/spondylosis. MD follow up after a few weeks of PT.         Relevant Medical History:see above  Functional Disability Index:Mod CHICHI-8%  The patient demonstrates at least 1% but less than 20% impairment, limitation or restriction in:      - changing and maintaining body position      Pain Scale: 4/10    Easing factors: Tyelnol, heating pad    Provocative factors: standing, walking, get out of bed in the morning, wearing dress shoes     Night Pain:  none     Type: []Constant   [x]Intermittent  []Radiating []Localized []other:     Numbness/Tingling: L > R toes     Red Flag Symptoms: Denied symptoms associated with more severe pathology    to include loss of bowel and bladder control, fever, chills, nausea, headache, recent weight gain, recent weight loss, night sweats, decreased appetite, fatigue. Functional Limitations/Impairments: []Sitting []Standing []Walking    []Squatting/bending  []Stairs           []ADL's  []Transfers []Sports/Recreation []Other:    Occupation/School: /    Sport/recreational activities:      Living Status/Prior Level of Function: This patient was independent in ADL's and IADL's prior to onset of symptoms.        OBJECTIVE:       Standing Exam Normal Abnormal N/A Comments   Toe walk   x      Heel Walk x      Pelvic Height x      Fwd Bend- (aberrant juttering or innominate mvmt)- Standing Flexion Test x      Extension x      Sacral Sulcus Test (Side Flexion) x      Trendelenburg x      Combined Movements                                   Seated Exam Normal Abnormal N/A Comments   Pelvic Height x      Seated Rotation x      Seated flexion x      B hip IR  x  Limited ROM    SLUMP Test x             Supine Exam Normal Abnormal N/A Comments   Hip flexion x      Abduction x      ER x      IR  x  Limited ROM   JODEE/Saurav x      FADIR x      SLR x      Crossed SLR x      Supine to sit x      Supine to Sit Test                            Prone Exam Normal Abnormal N/A Comments   Prone knee bend x      Prone hip IR  x  Limited ROM   B Achilles reflex/Pheasant       PA/Spring  x  L;imited mobility   Prone Instability test       Sacral Spring/thrust affect course of rehabilitation):   []None           Arthritic conditions   []Rheumatoid arthritis (M05.9)  []Osteoarthritis (M19.91)   Cardiovascular conditions   []Hypertension (I10)  []Hyperlipidemia (E78.5)  []Angina pectoris (I20)  []Atherosclerosis (I70)   Musculoskeletal conditions   []Disc pathology   []Congenital spine pathologies   []Prior surgical intervention  []Osteoporosis (M81.8)  []Osteopenia (M85.8)   Endocrine conditions   []Hypothyroid (E03.9)  []Hyperthyroid Gastrointestinal conditions   []Constipation (M19.92)   Metabolic conditions   []Morbid obesity (E66.01)  []Diabetes type 1(E10.65) or 2 (E11.65)   []Neuropathy (G60.9)     Pulmonary conditions   []Asthma (J45)  []Coughing   []COPD (J44.9)   Psychological Disorders  [x]Anxiety (F41.9)  [x]Depression (F32.9)   []Other:   [x]Other:   Hx of skin cancer         Barriers to/and or personal factors that will affect rehab potential:              []Age  []Sex              []Motivation/Lack of Motivation                        []Co-Morbidities              []Cognitive Function, education/learning barriers              []Environmental, home barriers              []profession/work barriers  []past PT/medical experience  []other:  Justification:     Falls Risk Assessment (30 days):   [x] Falls Risk assessed and no intervention required.   [] Falls Risk assessed and Patient requires intervention due to being higher risk   TUG score (>12s at risk):     [] Falls education provided, including       ASSESSMENT:   Functional Impairments:     [x]Noted lumbar/proximal hip hypomobility   []Noted lumbosacral and/or generalized hypermobility   [x]Decreased Lumbosacral/hip/LE functional ROM   [x]Decreased core/proximal hip strength and neuromuscular control    [x]Decreased LE functional strength    []Abnormal reflexes/sensation/myotomal/dermatomal deficits  []Reduced balance/proprioceptive control    []other:      Functional Activity Limitations (from functional questionnaire and intake)   []Reduced ability to tolerate prolonged functional positions   [x]Reduced ability or difficulty with changes of positions or transfers between positions   [x]Reduced ability to maintain good posture and demonstrate good body mechanics with sitting, bending, and lifting   []Reduced ability to sleep   [] Reduced ability or tolerance with driving and/or computer work   []Reduced ability to perform lifting, reaching, carrying tasks   []Reduced ability to squat   [x]Reduced ability to forward bend   [x]Reduced ability to ambulate prolonged functional periods/distances/surfaces   []Reduced ability to ascend/descend stairs   []other:       Participation Restrictions   []Reduced participation in self care activities   [x]Reduced participation in home management activities   [x]Reduced participation in work activities   [x]Reduced participation in social activities. []Reduced participation in sport/recreation activities. Classification:   [x]Signs/symptoms consistent with Lumbar instability/stabilization subgroup. []Signs/symptoms consistent with Lumbar mobilization/manipulation subgroup, myotomes and dermatomes intact. Meets manipulation criteria. []Signs/symptoms consistent with Lumbar direction specific/centralization subgroup   []Signs/symptoms consistent with Lumbar traction subgroup     []Signs/symptoms consistent with lumbar facet dysfunction   []Signs/symptoms consistent with lumbar stenosis type dysfunction   []Signs/symptoms consistent with nerve root involvement including myotome & dermatome dysfunction   []Signs/symptoms consistent with post-surgical status including: decreased ROM, strength and function.    []signs/symptoms consistent with pathology which may benefit from Dry needling     []other:    Prognosis/Rehab Potential:      []Excellent   [x]Good    []Fair   []Poor    Tolerance of evaluation/treatment:    []Excellent   [x]Good    []Fair   []Poor    Physical Therapy Evaluation Complexity Justification  [x] A history of present problem with:  [] no personal factors and/or comorbidities that impact the plan of care;  [x]1-2 personal factors and/or comorbidities that impact the plan of care  []3 personal factors and/or comorbidities that impact the plan of care  [x] An examination of body systems using standardized tests and measures addressing any of the following: body structures and functions (impairments), activity limitations, and/or participation restrictions;:  [] a total of 1-2 or more elements   [x] a total of 3 or more elements   [] a total of 4 or more elements   [x] A clinical presentation with:  [x] stable and/or uncomplicated characteristics   [] evolving clinical presentation with changing characteristics  [] unstable and unpredictable characteristics;   [x] Clinical decision making of [x] low, [] moderate, [] high complexity using standardized patient assessment instrument and/or measurable assessment of functional outcome. [x] EVAL (LOW) 11753 (typically 20 minutes face-to-face)  [] EVAL (MOD) 27683 (typically 30 minutes face-to-face)  [] EVAL (HIGH) 05805 (typically 45 minutes face-to-face)  [] RE-EVAL       PLAN: Begin PT focusing on: proximal hip mobilizations, LB mobs, LB core activation, proximal hip activation, and HEP    Frequency/Duration:  1-2 days per week for 5 Weeks:  Interventions:  1. Therapeutic exercise including: strength training, ROM/flexibility, NMR and proprioception for the proximal upper extremity and deep neck flexors. 1 Therapeutic exercise including: strength training, ROM/flexibility, NMR and proprioception for the core, hips and bilateral lower extremities. 2. Manual therapy as indicated including Dry Needling/IASTM, STM, PROM, Gr I-IV mobilizations, spinal mobilization/manipulation. 3. Modalities as needed including: thermal agents, E-stim, US, iontophoresis as indicated.    4. Patient education on spine protection, activity modification, progression of HEP. HEP instruction: Can be found in media file. (see scanned forms)    GOALS:  Patient stated goal: decrease pain    The patient will demonstrate 0%  impairment, limitation or restriction in:    - changing and maintaining body position    Therapist goals for Patient:Lumbar   Short Term Goals: To be achieved in: 2 weeks  1. Independent in HEP and progression per patient tolerance, in order to prevent re-injury. 2. Patient will have a decrease in pain to facilitate improvement in movement, function, and ADLs as indicated by Functional Deficits. Long Term Goals: To be achieved in: 5 weeks  1. Disability index score of 0% for the CHICHI to assist with reaching prior level of function. 2. Patient will demonstrate increased AROM to WNL, good LS mobility, good hip ROM to allow for proper joint functioning as indicated by patients Functional Deficits. 3. Patient will demonstrate an increase in Strength to good proximal hip and core activation to allow for proper functional mobility as indicated by patients Functional Deficits. 4. Patient will return to standing/walking for 20 minutes,  functional activities without increased symptoms or restriction.           Electronically signed by:  Kitty Nguyen, [id number] [facility]

## 2019-05-29 NOTE — FLOWSHEET NOTE
self care, mobility, lifting and ambulation.  [] (90793) Provided verbal/tactile cueing for activities related to improving balance, coordination, kinesthetic sense, posture, motor skill, proprioception  to assist with core control in self care, mobility, lifting, and ambulation. Therapeutic Activities:    [] (96147 or 30654) Provided verbal/tactile cueing for activities related to improving balance, coordination, kinesthetic sense, posture, motor skill, proprioception and motor activation to allow for proper function  with self care and ADLs  [] (01924) Provided training and instruction to the patient for proper core and proximal hip recruitment and positioning with ambulation re-education     Home Exercise Program:    [x] (06414) Reviewed/Progressed HEP activities related to strengthening, flexibility, endurance, ROM of core, proximal hip and LE for functional self-care, mobility, lifting and ambulation   [] (38072) Reviewed/Progressed HEP activities related to improving balance, coordination, kinesthetic sense, posture, motor skill, proprioception of core, proximal hip and LE for self care, mobility, lifting, and ambulation      Manual Treatments: Traction / PA's (Gr I, II, III, IV) / Stretch- Hamstring, Piriformis, Hip Flexor, Groin / STM/ Sacral Decompression  [x] Provided manual therapy to mobilize soft tissue/joints for the purpose of modulating pain, promoting relaxation, increasing ROM, reducing/eliminating soft tissue swelling/inflammation/restriction, improving soft tissue extensibility and allowing for proper ROM for normal function. (31173).      Modalities:       Charges:  Timed Code Treatment Minutes: 25   Total Treatment Minutes: 45     [x] EVAL (LOW) 74922 (typically 20 minutes face-to-face)  [] EVAL (MOD) 14799 (typically 30 minutes face-to-face)  [] EVAL (HIGH) 36242 (typically 45 minutes face-to-face)  [] RE-EVAL     [x] TK(94217) x  1   [] IONTO   [] NMR (98760) x      [] VASO  [x] Manual ([id number]) x  1    [] Other:  [] TA x       [] Mech Traction (30503)  [] ES(attended) (98039)      [] ES (un) (80835):     Goals:   Short Term Goals: To be achieved in: 2 weeks  1. Independent in HEP and progression per patient tolerance, in order to prevent re-injury. 2. Patient will have a decrease in pain to facilitate improvement in movement, function, and ADLs as indicated by Functional Deficits. Long Term Goals: To be achieved in: 5 weeks  1. Disability index score of 0% for the CHICHI to assist with reaching prior level of function. 2. Patient will demonstrate increased AROM to WNL, good LS mobility, good hip ROM to allow for proper joint functioning as indicated by patients Functional Deficits. 3. Patient will demonstrate an increase in Strength to good proximal hip and core activation to allow for proper functional mobility as indicated by patients Functional Deficits. 4. Patient will return to standing/walking for 20 minutes,  functional activities without increased symptoms or restriction. Progression Towards Functional goals:  [] Patient is progressing as expected towards functional goals listed. [] Progression is slowed due to complexities listed. [] Progression has been slowed due to co-morbidities. [x] Plan just implemented, too soon to assess goals progression  [] Other:     ASSESSMENT:  See eval    Treatment/Activity Tolerance:  [x] Patient tolerated treatment well [] Patient limited by fatique  [] Patient limited by pain  [] Patient limited by other medical complications  [] Other:     Prognosis: [x] Good [] Fair  [] Poor    Patient Requires Follow-up: [x] Yes  [] No    PLAN FOR NEXT SESSION:     PLAN: See eval  [] Continue per plan of care [] Alter current plan (see comments)  [x] Plan of care initiated [] Hold pending MD visit [] Discharge    *If patient does not return for further follow ups after this date. Please consider this as the patients discharge from physical therapy. Electronically signed by: Trena Walker PT [id number]

## 2019-06-05 ENCOUNTER — HOSPITAL ENCOUNTER (OUTPATIENT)
Dept: PHYSICAL THERAPY | Age: 49
Setting detail: THERAPIES SERIES
Discharge: HOME | End: 2019-06-05
Payer: COMMERCIAL

## 2019-06-05 PROCEDURE — 97110 THERAPEUTIC EXERCISES: CPT | Performed by: SPECIALIST/TECHNOLOGIST

## 2019-06-05 PROCEDURE — 97140 MANUAL THERAPY 1/> REGIONS: CPT | Performed by: SPECIALIST/TECHNOLOGIST

## 2019-06-05 NOTE — FLOWSHEET NOTE
[facility] [address] [place] [address] [facility]    Physical Therapy Daily Treatment Note  Date:  2019    Patient Name:  Cathy Ruff    :  1970  MRN: [id number]  Restrictions/Precautions:    Medical/Treatment Diagnosis Information:  · Diagnosis: Lumbar DDD   M51.36  · Treatment Diagnosis: PT treatment diagnosis:  low back pain, stiffness, weakness  Insurance/Certification information:  PT Insurance Information: Le   20 visits/yr,  $15 copay  Physician Information:  Referring Practitioner: Dr Facundo Benito of care signed (Y/N):     Date of Patient follow up with Physician:     G-Code (if applicable):      Date G-Code Applied:         Progress Note: []  Yes  []  No  Next due by: Visit #10      Latex Allergy:  [x]NO      []YES  Preferred Language for Healthcare:   [x]English       []other:    Visit # Insurance Allowable   2 20       Auth Required   []  Yes    [] No    Visits Approved  Date Ranged-       Pain level:  1-2/10     SUBJECTIVE:  Pt. Reports his back has more tightness than pain. Pt. Reports frequency of the tingling in his toes has decreased.         OBJECTIVE:   Observation:   Test measurements:      RESTRICTIONS/PRECAUTIONS: Hx of skin cancer    Exercises/Interventions:       Script:  7/3/19  Exercise/Equipment Sets/Reps Notes Last Progression   Hand Knee Rock      Prone Press Up      LTR w/ crossed legs 3x30'' B     Piriformis Cross Over/Figure 4 piriformis Stretch 3x30'' B     SKC      DKC      Prone Quad Stretch       Hamstring Stretch 3x30'' B tableside    Quadruped UE      Quadruped LE      Quadruped UE/LE combined (birddog)       TA / Multifidus / Abd Hollow      TA March      Bridge 20x     SLR Flex      SLR Abd 2x10 B     Clamshells      Prone plank      Side plank      Squats      Prone glut set 15 x 5\" New     Prone LE ext X 15 alt New                 Manuals   10'         Therapeutic Exercise and NMR EXR  [x] (35275) Provided verbal/tactile cueing for activities related to strengthening, flexibility, endurance, ROM  for improvements in proximal hip and core control with self care, mobility, lifting and ambulation.  [] (71475) Provided verbal/tactile cueing for activities related to improving balance, coordination, kinesthetic sense, posture, motor skill, proprioception  to assist with core control in self care, mobility, lifting, and ambulation. Therapeutic Activities:    [] (37743 or 50080) Provided verbal/tactile cueing for activities related to improving balance, coordination, kinesthetic sense, posture, motor skill, proprioception and motor activation to allow for proper function  with self care and ADLs  [] (15339) Provided training and instruction to the patient for proper core and proximal hip recruitment and positioning with ambulation re-education     Home Exercise Program:    [x] (24296) Reviewed/Progressed HEP activities related to strengthening, flexibility, endurance, ROM of core, proximal hip and LE for functional self-care, mobility, lifting and ambulation   [] (61247) Reviewed/Progressed HEP activities related to improving balance, coordination, kinesthetic sense, posture, motor skill, proprioception of core, proximal hip and LE for self care, mobility, lifting, and ambulation      Manual Treatments: Traction / PA's (Gr I, II, III, IV) / Stretch- Hamstring, Piriformis, Hip Flexor, Groin / STM/ Sacral Decompression  [x] Provided manual therapy to mobilize soft tissue/joints for the purpose of modulating pain, promoting relaxation, increasing ROM, reducing/eliminating soft tissue swelling/inflammation/restriction, improving soft tissue extensibility and allowing for proper ROM for normal function. (49124).      Modalities:   [facility] 15'    Charges:  Timed Code Treatment Minutes: 30   Total Treatment Minutes: 45     [] EVAL (LOW) 98359 (typically 20 minutes face-to-face)  [] EVAL (MOD) 08316 (typically 30 minutes face-to-face)  [] EVAL (HIGH) 09345 (typically 45 minutes face-to-face)  [] RE-EVAL     [x] Yoruba(81027) x  1   [] IONTO   [] NMR (89336) x      [] VASO  [x] Manual (19881) x  1    [] Other:  [] TA x       [] Mech Traction (56753)  [] ES(attended) (01528)      [] ES (un) (12856):     Goals:   Short Term Goals: To be achieved in: 2 weeks  1. Independent in HEP and progression per patient tolerance, in order to prevent re-injury. 2. Patient will have a decrease in pain to facilitate improvement in movement, function, and ADLs as indicated by Functional Deficits. Long Term Goals: To be achieved in: 5 weeks  1. Disability index score of 0% for the CHICHI to assist with reaching prior level of function. 2. Patient will demonstrate increased AROM to WNL, good LS mobility, good hip ROM to allow for proper joint functioning as indicated by patients Functional Deficits. 3. Patient will demonstrate an increase in Strength to good proximal hip and core activation to allow for proper functional mobility as indicated by patients Functional Deficits. 4. Patient will return to standing/walking for 20 minutes,  functional activities without increased symptoms or restriction. Progression Towards Functional goals:  [] Patient is progressing as expected towards functional goals listed. [] Progression is slowed due to complexities listed. [] Progression has been slowed due to co-morbidities. [x] Plan just implemented, too soon to assess goals progression  [] Other:     ASSESSMENT:  Pt. Required cues with exercises for proper technique.       Treatment/Activity Tolerance:  [x] Patient tolerated treatment well [] Patient limited by fatique  [] Patient limited by pain  [] Patient limited by other medical complications  [] Other:     Prognosis: [x] Good [] Fair  [] Poor    Patient Requires Follow-up: [x] Yes  [] No    PLAN FOR NEXT SESSION:     PLAN: See eval  [] Continue per plan of care [] Alter current plan (see comments)  [x] Plan of care initiated [] Hold pending MD visit [] Discharge    *If patient does not return for further follow ups after this date. Please consider this as the patients discharge from physical therapy.      Electronically signed by: Elizabeth Hernadez, [address], Joe Sawyer 1

## 2019-06-10 ENCOUNTER — OFFICE VISIT (OUTPATIENT)
Dept: PSYCHOLOGY | Age: 49
End: 2019-06-10
Payer: COMMERCIAL

## 2019-06-10 ENCOUNTER — HOSPITAL ENCOUNTER (OUTPATIENT)
Dept: PHYSICAL THERAPY | Age: 49
Setting detail: THERAPIES SERIES
Discharge: HOME | End: 2019-06-10
Payer: COMMERCIAL

## 2019-06-10 DIAGNOSIS — F33.1 MAJOR DEPRESSIVE DISORDER, RECURRENT EPISODE, MODERATE (HCC): Primary | ICD-10-CM

## 2019-06-10 PROCEDURE — 97140 MANUAL THERAPY 1/> REGIONS: CPT | Performed by: PHYSICAL THERAPIST

## 2019-06-10 PROCEDURE — 90791 PSYCH DIAGNOSTIC EVALUATION: CPT | Performed by: PSYCHOLOGIST

## 2019-06-10 PROCEDURE — 97112 NEUROMUSCULAR REEDUCATION: CPT | Performed by: PHYSICAL THERAPIST

## 2019-06-10 PROCEDURE — 97110 THERAPEUTIC EXERCISES: CPT | Performed by: PHYSICAL THERAPIST

## 2019-06-10 NOTE — FLOWSHEET NOTE
Therapeutic Exercise and NMR EXR  [x] (76640) Provided verbal/tactile cueing for activities related to strengthening, flexibility, endurance, ROM  for improvements in proximal hip and core control with self care, mobility, lifting and ambulation. [x] (22447) Provided verbal/tactile cueing for activities related to improving balance, coordination, kinesthetic sense, posture, motor skill, proprioception  to assist with core control in self care, mobility, lifting, and ambulation. Therapeutic Activities:    [] (77376 or 40632) Provided verbal/tactile cueing for activities related to improving balance, coordination, kinesthetic sense, posture, motor skill, proprioception and motor activation to allow for proper function  with self care and ADLs  [] (53021) Provided training and instruction to the patient for proper core and proximal hip recruitment and positioning with ambulation re-education     Home Exercise Program:    [x] (21285) Reviewed/Progressed HEP activities related to strengthening, flexibility, endurance, ROM of core, proximal hip and LE for functional self-care, mobility, lifting and ambulation   [] (92787) Reviewed/Progressed HEP activities related to improving balance, coordination, kinesthetic sense, posture, motor skill, proprioception of core, proximal hip and LE for self care, mobility, lifting, and ambulation      Manual Treatments: Traction / PA's (Gr I, II, III, IV) / Stretch- Hamstring, Piriformis, Hip Flexor, Groin / STM/ Sacral Decompression  [x] Provided manual therapy to mobilize soft tissue/joints for the purpose of modulating pain, promoting relaxation, increasing ROM, reducing/eliminating soft tissue swelling/inflammation/restriction, improving soft tissue extensibility and allowing for proper ROM for normal function. (99607).      Modalities:   [facility] 15'     Charges:  Timed Code Treatment Minutes: 40   Total Treatment Minutes: 55     [] EVAL (LOW) 64230 (typically 20 minutes face-to-face)  [] EVAL (MOD) 14580 (typically 30 minutes face-to-face)  [] EVAL (HIGH) 29261 (typically 45 minutes face-to-face)  [] RE-EVAL     [x] RV(24828) x  1   [] IONTO   [x] NMR (30584) x  1   [] VASO  [x] Manual (38131) x  1    [] Other:  [] TA x       [] Mech Traction (21879)  [] ES(attended) (85576)      [] ES (un) (63061):     Goals:   Short Term Goals: To be achieved in: 2 weeks  1. Independent in HEP and progression per patient tolerance, in order to prevent re-injury. 2. Patient will have a decrease in pain to facilitate improvement in movement, function, and ADLs as indicated by Functional Deficits. Long Term Goals: To be achieved in: 5 weeks  1. Disability index score of 0% for the CHICHI to assist with reaching prior level of function. 2. Patient will demonstrate increased AROM to WNL, good LS mobility, good hip ROM to allow for proper joint functioning as indicated by patients Functional Deficits. 3. Patient will demonstrate an increase in Strength to good proximal hip and core activation to allow for proper functional mobility as indicated by patients Functional Deficits. 4. Patient will return to standing/walking for 20 minutes,  functional activities without increased symptoms or restriction. Progression Towards Functional goals:  [] Patient is progressing as expected towards functional goals listed. [] Progression is slowed due to complexities listed. [] Progression has been slowed due to co-morbidities. [x] Plan just implemented, too soon to assess goals progression  [] Other:     ASSESSMENT:  Tolerated Rx well without increase in symptoms. Some VC required to keep core engaged while completing TE.        Treatment/Activity Tolerance:  [x] Patient tolerated treatment well [] Patient limited by fatique  [] Patient limited by pain  [] Patient limited by other medical complications  [] Other:     Prognosis: [x] Good [] Fair  [] Poor    Patient Requires Follow-up: [x] Yes  [] No    PLAN FOR NEXT SESSION:     PLAN: See eval  [x] Continue per plan of care [] Alter current plan (see comments)  [] Plan of care initiated [] Hold pending MD visit [] Discharge    *If patient does not return for further follow ups after this date. Please consider this as the patients discharge from physical therapy.      Electronically signed by: Lalita Brown PT [id number]

## 2019-06-10 NOTE — PROGRESS NOTES
Behavioral Health Consultation  Mickey Stern, Ph.D.  Psychologist  6/10/2019  12:01 PM      Time spent with Patient: 30 minutes  This is patient's first [facility] appointment. Reason for Consult:    Chief Complaint   Patient presents with    Depression     Referring Provider: Koki Arreola MD  [address] [facility], [address] [facility]    Pt provided informed consent for the behavioral health program. Discussed with patient model of service to include the limits of confidentiality (i.e. abuse reporting, suicide  intervention, etc.) and short-term intervention focused approach. Pt indicated understanding. Feedback given to PCP. S:  Pt seen per PCP re: depression    Pt seen for intake and reported sxs consistent w/ Major Depressive Disorder with a moderate presentation. Pt specifically endorsed depressed mood, deactivation, anhedonia, poor self-worth, social isolation,,  fatigue, psychomotor retardation, and poor concentration/focus. Pt reported that his sxs have been present on/off for 10 years and attributed the most recent onset to challenging interpersonal dynamics. 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 Pt also noted that he has difficulty advocating for himself in his workplace which leads him to get taken advantage of (providing services w/o pay or recognitions). Mitigating factors to presentation including distraction and bx activation. Exacerbating factors include poor self care, limited social support, and difficult asserting boundaries. Focused intervention on psychoed re: depression, bx activation, and assertive communication. Practiced assertive communication with boss to express unmet needs.    O:  MSE:    Appearance    alert, mild distress  Impulsive behavior No  Speech    soft  Mood    Depressed  Affect    depressed affect  Thought Content cognitive distortions  Thought Process    linear and coherent  Associations    logical connections, tangential connections  Insight    Fair  Judgment    Intact  Orientation    oriented to person, place, time, and general circumstances  Memory    recent and remote memory intact  Attention/Concentration    intact  Morbid ideation No  Suicide Assessment    no suicidal ideation    History:    Social History:   Social History     Socioeconomic History    Marital status: Single     Spouse name: Not on file    Number of children: Not on file    Years of education: Not on file    Highest education level: Not on file   Occupational History    Occupation: animation/design   Social Needs    Financial resource strain: Not on file    Food insecurity:     Worry: Not on file     Inability: Not on file    Transportation needs:     Medical: Not on file     Non-medical: Not on file   Tobacco Use    Smoking status: Never Smoker    Smokeless tobacco: Never Used   Substance and Sexual Activity    Alcohol use: Yes     Frequency: 2-4 times a month     Comment: 2 beers a week     Drug use: Not on file    Sexual activity: Not on file   Lifestyle    Physical activity:     Days per week: Not on file     Minutes per session: Not on file    Stress: Not on file   Relationships    Social connections:     Talks on phone: Not on file     Gets together: Not on file     Attends Sabianism service: Not on file     Active member of club or organization: Not on file     Attends meetings of clubs or organizations: Not on file     Relationship status: Not on file    Intimate partner violence:     Fear of current or ex partner: Not on file     Emotionally abused: Not on file     Physically abused: Not on file     Forced sexual activity: Not on file   Other Topics Concern    Not on file   Social History Narrative    Not on file     TOBACCO:   reports that he has never smoked.  He has never used smokeless tobacco.  ETOH:   reports that he

## 2019-06-12 ENCOUNTER — HOSPITAL ENCOUNTER (OUTPATIENT)
Dept: PHYSICAL THERAPY | Age: 49
Setting detail: THERAPIES SERIES
Discharge: HOME | End: 2019-06-12
Payer: COMMERCIAL

## 2019-06-12 NOTE — FLOWSHEET NOTE
The [address] [place],[address], [facility]      Physical Therapy  Cancellation/No-show Note  Patient Name:  Zeke Lord  :  1970   Date:  2019  Cancelled visits to date: 1  No-shows to date: 0    For today's appointment patient:  [x]  Cancelled  [x]  Rescheduled appointment  []  No-show     Reason given by patient:  []  Patient ill  []  Conflicting appointment  []  No transportation    [x]  Conflict with work  []  No reason given  []  Other:     Comments:      Electronically signed by:  Suleman Vazquez PT

## 2019-06-13 ENCOUNTER — HOSPITAL ENCOUNTER (OUTPATIENT)
Dept: PHYSICAL THERAPY | Age: 49
Setting detail: THERAPIES SERIES
Discharge: HOME | End: 2019-06-13
Payer: COMMERCIAL

## 2019-06-13 PROCEDURE — 97140 MANUAL THERAPY 1/> REGIONS: CPT | Performed by: PHYSICAL THERAPIST

## 2019-06-13 PROCEDURE — 97110 THERAPEUTIC EXERCISES: CPT | Performed by: PHYSICAL THERAPIST

## 2019-06-13 NOTE — FLOWSHEET NOTE
Provided verbal/tactile cueing for activities related to strengthening, flexibility, endurance, ROM  for improvements in proximal hip and core control with self care, mobility, lifting and ambulation. [x] (07734) Provided verbal/tactile cueing for activities related to improving balance, coordination, kinesthetic sense, posture, motor skill, proprioception  to assist with core control in self care, mobility, lifting, and ambulation. Therapeutic Activities:    [] (00849 or 05550) Provided verbal/tactile cueing for activities related to improving balance, coordination, kinesthetic sense, posture, motor skill, proprioception and motor activation to allow for proper function  with self care and ADLs  [] (52359) Provided training and instruction to the patient for proper core and proximal hip recruitment and positioning with ambulation re-education     Home Exercise Program:    [x] (23498) Reviewed/Progressed HEP activities related to strengthening, flexibility, endurance, ROM of core, proximal hip and LE for functional self-care, mobility, lifting and ambulation   [] (37419) Reviewed/Progressed HEP activities related to improving balance, coordination, kinesthetic sense, posture, motor skill, proprioception of core, proximal hip and LE for self care, mobility, lifting, and ambulation      Manual Treatments: Traction / PA's (Gr I, II, III, IV) / Stretch- Hamstring, Piriformis, Hip Flexor, Groin / STM/ Sacral Decompression  [x] Provided manual therapy to mobilize soft tissue/joints for the purpose of modulating pain, promoting relaxation, increasing ROM, reducing/eliminating soft tissue swelling/inflammation/restriction, improving soft tissue extensibility and allowing for proper ROM for normal function. (29380).      Modalities:   [facility] 15'     Charges:  Timed Code Treatment Minutes: 40   Total Treatment Minutes: 55     [] EVAL (LOW) 93206 (typically 20 minutes face-to-face)  [] EVAL (MOD) 57833 (typically 30 minutes face-to-face)  [] EVAL (HIGH) 76147 (typically 45 minutes face-to-face)  [] RE-EVAL     [x] PF(86441) x  1   [] IONTO   [x] NMR (44260) x  1   [] VASO  [x] Manual (43443) x  1    [] Other:  [] TA x       [] Mech Traction (49248)  [] ES(attended) (18824)      [] ES (un) (42064):     Goals:   Short Term Goals: To be achieved in: 2 weeks  1. Independent in HEP and progression per patient tolerance, in order to prevent re-injury. 2. Patient will have a decrease in pain to facilitate improvement in movement, function, and ADLs as indicated by Functional Deficits. Long Term Goals: To be achieved in: 5 weeks  1. Disability index score of 0% for the CHICHI to assist with reaching prior level of function. 2. Patient will demonstrate increased AROM to WNL, good LS mobility, good hip ROM to allow for proper joint functioning as indicated by patients Functional Deficits. 3. Patient will demonstrate an increase in Strength to good proximal hip and core activation to allow for proper functional mobility as indicated by patients Functional Deficits. 4. Patient will return to standing/walking for 20 minutes,  functional activities without increased symptoms or restriction. Progression Towards Functional goals:  [x] Patient is progressing as expected towards functional goals listed. [] Progression is slowed due to complexities listed. [] Progression has been slowed due to co-morbidities. [x] Plan just implemented, too soon to assess goals progression  [] Other:     ASSESSMENT:  Cueing needed for new TE for form and maintaining neutral spine.      Treatment/Activity Tolerance:  [x] Patient tolerated treatment well [] Patient limited by fatique  [] Patient limited by pain  [] Patient limited by other medical complications  [] Other:     Prognosis: [x] Good [] Fair  [] Poor    Patient Requires Follow-up: [x] Yes  [] No    PLAN FOR NEXT SESSION:     PLAN: See eval  [x] Continue per plan of care [] Alter current plan (see comments)  [] Plan of care initiated [] Hold pending MD visit [] Discharge    *If patient does not return for further follow ups after this date. Please consider this as the patients discharge from physical therapy.      Electronically signed by: Joe Spencer 1

## 2019-06-17 ENCOUNTER — HOSPITAL ENCOUNTER (OUTPATIENT)
Dept: PHYSICAL THERAPY | Age: 49
Setting detail: THERAPIES SERIES
Discharge: HOME | End: 2019-06-17
Payer: COMMERCIAL

## 2019-06-17 PROCEDURE — 97112 NEUROMUSCULAR REEDUCATION: CPT | Performed by: PHYSICAL THERAPIST

## 2019-06-17 PROCEDURE — 97140 MANUAL THERAPY 1/> REGIONS: CPT | Performed by: PHYSICAL THERAPIST

## 2019-06-17 PROCEDURE — 97110 THERAPEUTIC EXERCISES: CPT | Performed by: PHYSICAL THERAPIST

## 2019-06-17 NOTE — FLOWSHEET NOTE
Squats          Prone swimmer on SB X 10 alt     Donkey kick  15x B End of table    Hip ext 15x B End of table                Manuals   15'         Therapeutic Exercise and NMR EXR  [x] (58768) Provided verbal/tactile cueing for activities related to strengthening, flexibility, endurance, ROM  for improvements in proximal hip and core control with self care, mobility, lifting and ambulation. [x] (85867) Provided verbal/tactile cueing for activities related to improving balance, coordination, kinesthetic sense, posture, motor skill, proprioception  to assist with core control in self care, mobility, lifting, and ambulation. Therapeutic Activities:    [] (11516 or 52048) Provided verbal/tactile cueing for activities related to improving balance, coordination, kinesthetic sense, posture, motor skill, proprioception and motor activation to allow for proper function  with self care and ADLs  [] (17827) Provided training and instruction to the patient for proper core and proximal hip recruitment and positioning with ambulation re-education     Home Exercise Program:    [x] (84816) Reviewed/Progressed HEP activities related to strengthening, flexibility, endurance, ROM of core, proximal hip and LE for functional self-care, mobility, lifting and ambulation   [] (95814) Reviewed/Progressed HEP activities related to improving balance, coordination, kinesthetic sense, posture, motor skill, proprioception of core, proximal hip and LE for self care, mobility, lifting, and ambulation      Manual Treatments: Traction / PA's (Gr I, II, III, IV) / Stretch- Hamstring, Piriformis, Hip Flexor, Groin / STM/ Sacral Decompression  [x] Provided manual therapy to mobilize soft tissue/joints for the purpose of modulating pain, promoting relaxation, increasing ROM, reducing/eliminating soft tissue swelling/inflammation/restriction, improving soft tissue extensibility and allowing for proper ROM for normal function. (34111). Modalities:   [facility] 15'     Charges:  Timed Code Treatment Minutes: 40   Total Treatment Minutes: 55     [] EVAL (LOW) 27291 (typically 20 minutes face-to-face)  [] EVAL (MOD) 31945 (typically 30 minutes face-to-face)  [] EVAL (HIGH) 60252 (typically 45 minutes face-to-face)  [] RE-EVAL     [x] JW(06714) x  1   [] IONTO   [x] NMR (70039) x  1   [] VASO  [x] Manual (15423) x  1    [] Other:  [] TA x       [] Mech Traction (26384)  [] ES(attended) (05078)      [] ES (un) (97245):     Goals:   Short Term Goals: To be achieved in: 2 weeks  1. Independent in HEP and progression per patient tolerance, in order to prevent re-injury. 2. Patient will have a decrease in pain to facilitate improvement in movement, function, and ADLs as indicated by Functional Deficits. Long Term Goals: To be achieved in: 5 weeks  1. Disability index score of 0% for the CHICHI to assist with reaching prior level of function. 2. Patient will demonstrate increased AROM to WNL, good LS mobility, good hip ROM to allow for proper joint functioning as indicated by patients Functional Deficits. 3. Patient will demonstrate an increase in Strength to good proximal hip and core activation to allow for proper functional mobility as indicated by patients Functional Deficits. 4. Patient will return to standing/walking for 20 minutes,  functional activities without increased symptoms or restriction. Progression Towards Functional goals:  [x] Patient is progressing as expected towards functional goals listed. [] Progression is slowed due to complexities listed. [] Progression has been slowed due to co-morbidities. [x] Plan just implemented, too soon to assess goals progression  [] Other:     ASSESSMENT:  Tolerated Rx well without increase in symptoms. Good stability/control with SB exercises.       Treatment/Activity Tolerance:  [x] Patient tolerated treatment well [] Patient limited by fatique  [] Patient limited by pain  [] Patient limited by other medical complications  [] Other:     Prognosis: [x] Good [] Fair  [] Poor    Patient Requires Follow-up: [x] Yes  [] No    PLAN FOR NEXT SESSION:     PLAN: See eval  [x] Continue per plan of care [] Alter current plan (see comments)  [] Plan of care initiated [] Hold pending MD visit [] Discharge    *If patient does not return for further follow ups after this date. Please consider this as the patients discharge from physical therapy.      Electronically signed by: Sandee Vega PT [id number]

## 2019-06-19 ENCOUNTER — OFFICE VISIT (OUTPATIENT)
Dept: ORTHOPEDIC SURGERY | Age: 49
End: 2019-06-19
Payer: COMMERCIAL

## 2019-06-19 VITALS — WEIGHT: 136.91 LBS | BODY MASS INDEX: 24.26 KG/M2 | HEIGHT: 63 IN

## 2019-06-19 DIAGNOSIS — R20.2 PARESTHESIA OF RIGHT LOWER EXTREMITY: ICD-10-CM

## 2019-06-19 DIAGNOSIS — M51.36 DDD (DEGENERATIVE DISC DISEASE), LUMBAR: Primary | ICD-10-CM

## 2019-06-19 PROCEDURE — 99213 OFFICE O/P EST LOW 20 MIN: CPT | Performed by: PHYSICAL MEDICINE & REHABILITATION

## 2019-06-19 PROCEDURE — G8420 CALC BMI NORM PARAMETERS: HCPCS | Performed by: PHYSICAL MEDICINE & REHABILITATION

## 2019-06-19 PROCEDURE — G8427 DOCREV CUR MEDS BY ELIG CLIN: HCPCS | Performed by: PHYSICAL MEDICINE & REHABILITATION

## 2019-06-19 PROCEDURE — 1036F TOBACCO NON-USER: CPT | Performed by: PHYSICAL MEDICINE & REHABILITATION

## 2019-06-19 NOTE — PROGRESS NOTES
Follow up: Alex Novak  1970  [id number]         Chief Complaint   Patient presents with    Lower Back Pain     [facility] . HISTORY OF PRESENT ILLNESS:  Mr. Cristobal Smith is a 53 y.o. male returns for a follow up visit for multiple medical problems. His current presenting problems are   1. DDD (degenerative disc disease), lumbar    2. Paresthesia of right lower extremity    . As per information/history obtained from the PADT(patient assessment and documentation tool) - He complains of pain in the lower back with radiation to the bilateral legs He rates the pain 1/10 and describes it as aching, burning, numbness, tingling. Pain is made worse by: lateral movements. He denies side effects from the current pain regimen. Patient reports that since the last follow up visit the physical functioning is better, family/social relationships are better, mood is better and sleep patterns are better, and that the overall functioning is better. Patient denies neurological bowel or bladder. Patient returns having completed roughly 5 physical therapy visits from 5/29/2019-6/17/2019. He reports mild to moderate improvement of his symptoms with this entity. The pain has not dissipated but the intensity his improved per his statement. He states that 1-2 days ago he felt a mild tingling in the plantar aspect of his left foot. Patient notes stiffness in the morning. He also reports compliancy with the home exercise program given to him by physical therapy. He denies any new injuries or triggers at this time. The patient is not ambulating with any assistive devices in the office today.      Associated signs and symptoms:   Neurogenic bowel or bladder symptoms:  no   Perceived weakness:  no   Difficulty walking:  no              Past Medical History:   Past Medical History:   Diagnosis Date    Allergic rhinitis     Anxiety     Arthritis     [facility] ([facility] [address]     Melanoma on left side    Dizziness     Headache  Melanoma in situ ([facility] [address]     left flank    Nosebleed     Prostate calculus     Saw Urology group- Dr. Steven Perez? - had mri 1/19 and \"scope\" in 9/18    Rash     Tinnitus       Past Surgical History:     Past Surgical History:   Procedure Laterality Date    CYSTOSCOPY  2018     Current Medications:   No current outpatient medications on file. Allergies:  Codeine and Penicillins  Social History:    reports that he has never smoked. He has never used smokeless tobacco. He reports that he drinks alcohol. Family History:   Family History   Problem Relation Age of Onset    Scoliosis Mother     Hypertension Father     Breast Cancer Sister     Diabetes type 2  Paternal Grandfather        REVIEW OF SYSTEMS:   CONSTITUTIONAL: Denies unexplained weight loss, fevers, chills or fatigue  NEUROLOGICAL: Denies unsteady gait or progressive weakness  MUSCULOSKELETAL: Denies joint swelling or redness  GI: Denies nausea, vomiting, diarrhea   : Denies bowel or bladder issues       PHYSICAL EXAM:    Vitals: Respirations -14 Height 5' 2.99\" (1.6 m), weight 136 lb 14.5 oz (62.1 kg). GENERAL EXAM:  · General Apparence: Patient is adequately groomed with no evidence of malnutrition. · Psychiatric: Orientation: The patient is oriented to time, place and person. The patient's mood and affect are appropriate   · Vascular: Examination reveals no swelling and palpation reveals no tenderness in upper or lower extremities. Good capillary refill. · The lymphatic examination of the neck, axillae and groin reveals all areas to be without enlargement or induration  · Sensation is intact without deficit in the upper and lower extremities to light touch and pinprick  · Coordination of the upper and lower extremities are normal.    LUMBAR/SACRAL EXAMINATION:  · Inspection: Local inspection shows no step-off or bruising. Lumbar alignment is normal. No instability is noted. · Palpation:   No evidence of tenderness at the midline. medications, bracing, and activity modification. The indications for therapeutic injections. The indications for additional imaging/laboratory studies. The indications for (possible future) interventions. After considering the various options discussed, Regina Nuno elected to pursue a course of treatment that includes the followin. Medications:  No further recommendations for new medications. 2. PT:  Encouraged to continue with HEP. 3. Further studies:  No further studies. 4. Interventional:  None at this time    5. Follow up:  4-6 weeks      Regina Nuno was instructed to call the office if his symptoms worsen or if new symptoms appear prior to the next scheduled visit. He is specifically instructed to contact the office between now & his scheduled appointment if he has concerns related to his condition or if he needs assistance in scheduling the above tests. He is welcome to call for an appointment sooner if he has any additional concerns or questions. Shelley Sheth ATC, am scribing for and in the presence of Dr. Matt Thompson.   19 4:17 PM Delbert Coronado ATC    I, Dr. Shefali Iverson. Yon personally performed the services described in this documentation as scribed by KEYUR Wolff in my presence and it is both accurate and complete. Akiko David. Angela Walton MD, [place], [facility]  Board Certified in [address] [place] [address] Certified and Fellowship Trained in [facility] ([facility])             This dictation was performed with a verbal recognition program [facility]) and it was checked for errors. It is possible that there are still dictated errors within this office note. If so, please bring any errors to my attention for an addendum. All efforts were made to ensure that this office note is accurate.

## 2019-07-01 ENCOUNTER — OFFICE VISIT (OUTPATIENT)
Dept: PSYCHOLOGY | Age: 49
End: 2019-07-01
Payer: COMMERCIAL

## 2019-07-01 DIAGNOSIS — F33.1 MODERATE EPISODE OF RECURRENT MAJOR DEPRESSIVE DISORDER (HCC): Primary | ICD-10-CM

## 2019-07-01 PROCEDURE — 90832 PSYTX W PT 30 MINUTES: CPT | Performed by: PSYCHOLOGIST

## 2019-07-01 NOTE — PROGRESS NOTES
Behavioral Health Consultation  Lali Montiel, Ph.D.  Psychologist  6/10/2019  12:01 PM      Time spent with Patient: 30 minutes  This is patient's [facility] appointment. Reason for Consult:    No chief complaint on file. Referring Provider: Boom Anders MD  [facility] [address]    Pt provided informed consent for the behavioral health program. Discussed with patient model of service to include the limits of confidentiality (i.e. abuse reporting, suicide  intervention, etc.) and short-term intervention focused approach. Pt indicated understanding. Feedback given to PCP. S:  Pt seen per PCP re: depression    Pt seen for f/u. Pt reported that his sister is coming in town and he wants to talk with her about the care of his parents. Pt was very black and white in his thinking and felt that he is unable to have this conversation unless the parameters are perfect (e.g., sister is receptive, other sister comes in to town). Used restructuring to manage maladaptive cognitions. Pt set goal to contact sister tonight to set up a time to talk when she is in town. Discussed grounding conversation in goals and Pt created plan for conversation.     O:  MSE:    Appearance    alert, mild distress  Impulsive behavior No  Speech    soft  Mood    Depressed  Affect    depressed affect  Thought Content    cognitive distortions  Thought Process    linear and coherent  Associations    logical connections, tangential connections  Insight    Fair  Judgment    Intact  Orientation    oriented to person, place, time, and general circumstances  Memory    recent and remote memory intact  Attention/Concentration    intact  Morbid ideation No  Suicide Assessment    no suicidal ideation    History:    Social History:   Social History     Socioeconomic History    Marital status: Single     Spouse name: Not on file    Number of children: Not on file    Years of education: Not on file    Highest education level: Not on file   Occupational History    Occupation: animation/design   Social Needs    Financial resource strain: Not on file    Food insecurity:     Worry: Not on file     Inability: Not on file    Transportation needs:     Medical: Not on file     Non-medical: Not on file   Tobacco Use    Smoking status: Never Smoker    Smokeless tobacco: Never Used   Substance and Sexual Activity    Alcohol use: Yes     Frequency: 2-4 times a month     Comment: 2 beers a week     Drug use: Not on file    Sexual activity: Not on file   Lifestyle    Physical activity:     Days per week: Not on file     Minutes per session: Not on file    Stress: Not on file   Relationships    Social connections:     Talks on phone: Not on file     Gets together: Not on file     Attends Adventism service: Not on file     Active member of club or organization: Not on file     Attends meetings of clubs or organizations: Not on file     Relationship status: Not on file    Intimate partner violence:     Fear of current or ex partner: Not on file     Emotionally abused: Not on file     Physically abused: Not on file     Forced sexual activity: Not on file   Other Topics Concern    Not on file   Social History Narrative    Not on file     TOBACCO:   reports that he has never smoked. He has never used smokeless tobacco.  ETOH:   reports that he drinks alcohol. A:  Patient engaged and cooperative. Denies SI. Insight and motivation are good. Diagnosis:    Major Depressive Disorder, moderate    Plan:  Goal: Enhance capacity to manage mood  Objective 1: Increase engagement in bx activation (6 months)  Objective 2: Utilize cognitive restructuring techniques to manage negative cognitions (6 months)  Objective 3: Increase engagement in self-care activities (6 months)    Goal: Improve healthy living bxs  Objective 1: Improve sleep hygiene practices (6 months)  Objective 2:  Increase activity and healthy eating bxs (6 months)    Goal: Improve interpersonal interactions  Objective 1: Learn and utilize effective communication strategies (6 months)  Objective 2: Implement boundaries and engage in limit setting (6 months    Pt interventions:  Discussed various factors related to the development and maintenance of  depression (including biological, cognitive, behavioral, and environmental factors), Discussed and set plan for behavioral activation, Trained in improving communication skills, Provided education, Discussed self-care (sleep, nutrition, rewarding activities, social support, exercise), Established rapport, [place]-setting to identify pt's primary goals for [facility] visit / overall health, Supportive techniques and Emphasized self-care as important for managing overall health    Documentation was done using voice recognition dragon software. Every effort was made to ensure accuracy; however, inadvertent, unintentional computerized transcription errors may be present.

## 2019-07-22 ENCOUNTER — OFFICE VISIT (OUTPATIENT)
Dept: PSYCHOLOGY | Age: 49
End: 2019-07-22
Payer: COMMERCIAL

## 2019-07-22 DIAGNOSIS — F33.1 MODERATE EPISODE OF RECURRENT MAJOR DEPRESSIVE DISORDER (HCC): Primary | ICD-10-CM

## 2019-07-22 PROCEDURE — 90832 PSYTX W PT 30 MINUTES: CPT | Performed by: PSYCHOLOGIST

## 2019-07-22 NOTE — PROGRESS NOTES
tangential connections  Insight    Fair  Judgment    Intact  Orientation    oriented to person, place, time, and general circumstances  Memory    recent and remote memory intact  Attention/Concentration    intact  Morbid ideation No  Suicide Assessment    no suicidal ideation    History:    Social History:   Social History     Socioeconomic History    Marital status: Single     Spouse name: Not on file    Number of children: Not on file    Years of education: Not on file    Highest education level: Not on file   Occupational History    Occupation: animation/design   Social Needs    Financial resource strain: Not on file    Food insecurity:     Worry: Not on file     Inability: Not on file    Transportation needs:     Medical: Not on file     Non-medical: Not on file   Tobacco Use    Smoking status: Never Smoker    Smokeless tobacco: Never Used   Substance and Sexual Activity    Alcohol use: Yes     Frequency: 2-4 times a month     Comment: 2 beers a week     Drug use: Not on file    Sexual activity: Not on file   Lifestyle    Physical activity:     Days per week: Not on file     Minutes per session: Not on file    Stress: Not on file   Relationships    Social connections:     Talks on phone: Not on file     Gets together: Not on file     Attends Scientologist service: Not on file     Active member of club or organization: Not on file     Attends meetings of clubs or organizations: Not on file     Relationship status: Not on file    Intimate partner violence:     Fear of current or ex partner: Not on file     Emotionally abused: Not on file     Physically abused: Not on file     Forced sexual activity: Not on file   Other Topics Concern    Not on file   Social History Narrative    Not on file     TOBACCO:   reports that he has never smoked. He has never used smokeless tobacco.  ETOH:   reports that he drinks alcohol. A:  Patient engaged and cooperative. Denies SI. Insight and motivation are good.

## 2019-08-12 ENCOUNTER — OFFICE VISIT (OUTPATIENT)
Dept: PSYCHOLOGY | Age: 49
End: 2019-08-12
Payer: COMMERCIAL

## 2019-08-12 DIAGNOSIS — F33.1 MAJOR DEPRESSIVE DISORDER, RECURRENT EPISODE, MODERATE (HCC): Primary | ICD-10-CM

## 2019-08-12 PROCEDURE — 90832 PSYTX W PT 30 MINUTES: CPT | Performed by: PSYCHOLOGIST

## 2019-08-12 NOTE — PROGRESS NOTES
interpersonal interactions  Objective 1: Learn and utilize effective communication strategies (6 months)  Objective 2: Implement boundaries and engage in limit setting (6 months    Pt interventions:  Discussed various factors related to the development and maintenance of  depression (including biological, cognitive, behavioral, and environmental factors), Discussed and set plan for behavioral activation, Trained in improving communication skills, Provided education, Discussed self-care (sleep, nutrition, rewarding activities, social support, exercise), Established rapport, [place]-setting to identify pt's primary goals for [facility] visit / overall health, Supportive techniques and Emphasized self-care as important for managing overall health    Documentation was done using voice recognition dragon software. Every effort was made to ensure accuracy; however, inadvertent, unintentional computerized transcription errors may be present.

## 2019-09-09 ENCOUNTER — OFFICE VISIT (OUTPATIENT)
Dept: PSYCHOLOGY | Age: 49
End: 2019-09-09
Payer: COMMERCIAL

## 2019-09-09 DIAGNOSIS — F33.1 MODERATE EPISODE OF RECURRENT MAJOR DEPRESSIVE DISORDER (HCC): Primary | ICD-10-CM

## 2019-09-09 PROCEDURE — 90832 PSYTX W PT 30 MINUTES: CPT | Performed by: PSYCHOLOGIST

## 2019-09-09 NOTE — PROGRESS NOTES
coherent  Associations    logical connections, tangential connections  Insight    Fair  Judgment    Intact  Orientation    oriented to person, place, time, and general circumstances  Memory    recent and remote memory intact  Attention/Concentration    intact  Morbid ideation No  Suicide Assessment    no suicidal ideation    History:    Social History:   Social History     Socioeconomic History    Marital status: Single     Spouse name: Not on file    Number of children: Not on file    Years of education: Not on file    Highest education level: Not on file   Occupational History    Occupation: animation/design   Social Needs    Financial resource strain: Not on file    Food insecurity:     Worry: Not on file     Inability: Not on file    Transportation needs:     Medical: Not on file     Non-medical: Not on file   Tobacco Use    Smoking status: Never Smoker    Smokeless tobacco: Never Used   Substance and Sexual Activity    Alcohol use: Yes     Frequency: 2-4 times a month     Comment: 2 beers a week     Drug use: Not on file    Sexual activity: Not on file   Lifestyle    Physical activity:     Days per week: Not on file     Minutes per session: Not on file    Stress: Not on file   Relationships    Social connections:     Talks on phone: Not on file     Gets together: Not on file     Attends Shinto service: Not on file     Active member of club or organization: Not on file     Attends meetings of clubs or organizations: Not on file     Relationship status: Not on file    Intimate partner violence:     Fear of current or ex partner: Not on file     Emotionally abused: Not on file     Physically abused: Not on file     Forced sexual activity: Not on file   Other Topics Concern    Not on file   Social History Narrative    Not on file     TOBACCO:   reports that he has never smoked. He has never used smokeless tobacco.  ETOH:   reports that he drinks alcohol. A:  Patient engaged and cooperative.

## 2019-09-20 ENCOUNTER — OFFICE VISIT (OUTPATIENT)
Dept: PSYCHOLOGY | Age: 49
End: 2019-09-20
Payer: COMMERCIAL

## 2019-09-20 DIAGNOSIS — F33.1 MAJOR DEPRESSIVE DISORDER, RECURRENT EPISODE, MODERATE (HCC): Primary | ICD-10-CM

## 2019-09-20 PROCEDURE — 90832 PSYTX W PT 30 MINUTES: CPT | Performed by: PSYCHOLOGIST

## 2019-09-20 ASSESSMENT — PATIENT HEALTH QUESTIONNAIRE - PHQ9
4. FEELING TIRED OR HAVING LITTLE ENERGY: 3
3. TROUBLE FALLING OR STAYING ASLEEP: 3
SUM OF ALL RESPONSES TO PHQ9 QUESTIONS 1 & 2: 4
8. MOVING OR SPEAKING SO SLOWLY THAT OTHER PEOPLE COULD HAVE NOTICED. OR THE OPPOSITE, BEING SO FIGETY OR RESTLESS THAT YOU HAVE BEEN MOVING AROUND A LOT MORE THAN USUAL: 0
1. LITTLE INTEREST OR PLEASURE IN DOING THINGS: 2
2. FEELING DOWN, DEPRESSED OR HOPELESS: 2
5. POOR APPETITE OR OVEREATING: 0
10. IF YOU CHECKED OFF ANY PROBLEMS, HOW DIFFICULT HAVE THESE PROBLEMS MADE IT FOR YOU TO DO YOUR WORK, TAKE CARE OF THINGS AT HOME, OR GET ALONG WITH OTHER PEOPLE: 1
6. FEELING BAD ABOUT YOURSELF - OR THAT YOU ARE A FAILURE OR HAVE LET YOURSELF OR YOUR FAMILY DOWN: 2
SUM OF ALL RESPONSES TO PHQ QUESTIONS 1-9: 13
7. TROUBLE CONCENTRATING ON THINGS, SUCH AS READING THE NEWSPAPER OR WATCHING TELEVISION: 1
SUM OF ALL RESPONSES TO PHQ QUESTIONS 1-9: 13
9. THOUGHTS THAT YOU WOULD BE BETTER OFF DEAD, OR OF HURTING YOURSELF: 0

## 2019-09-20 ASSESSMENT — ANXIETY QUESTIONNAIRES
4. TROUBLE RELAXING: 3-NEARLY EVERY DAY
3. WORRYING TOO MUCH ABOUT DIFFERENT THINGS: 2-OVER HALF THE DAYS
GAD7 TOTAL SCORE: 12
2. NOT BEING ABLE TO STOP OR CONTROL WORRYING: 2-OVER HALF THE DAYS
7. FEELING AFRAID AS IF SOMETHING AWFUL MIGHT HAPPEN: 1-SEVERAL DAYS
1. FEELING NERVOUS, ANXIOUS, OR ON EDGE: 2-OVER HALF THE DAYS
5. BEING SO RESTLESS THAT IT IS HARD TO SIT STILL: 1-SEVERAL DAYS
6. BECOMING EASILY ANNOYED OR IRRITABLE: 1-SEVERAL DAYS

## 2019-09-20 NOTE — PROGRESS NOTES
medical record/coordination with primary care team, etc.) and short-term intervention focused approach. Pt indicated understanding.

## 2019-10-07 ENCOUNTER — OFFICE VISIT (OUTPATIENT)
Dept: PSYCHOLOGY | Age: 49
End: 2019-10-07
Payer: COMMERCIAL

## 2019-10-07 DIAGNOSIS — F33.1 MAJOR DEPRESSIVE DISORDER, RECURRENT EPISODE, MODERATE (HCC): Primary | ICD-10-CM

## 2019-10-07 PROCEDURE — 90832 PSYTX W PT 30 MINUTES: CPT | Performed by: PSYCHOLOGIST

## 2019-10-07 ASSESSMENT — PATIENT HEALTH QUESTIONNAIRE - PHQ9
8. MOVING OR SPEAKING SO SLOWLY THAT OTHER PEOPLE COULD HAVE NOTICED. OR THE OPPOSITE, BEING SO FIGETY OR RESTLESS THAT YOU HAVE BEEN MOVING AROUND A LOT MORE THAN USUAL: 0
SUM OF ALL RESPONSES TO PHQ QUESTIONS 1-9: 14
SUM OF ALL RESPONSES TO PHQ QUESTIONS 1-9: 14
5. POOR APPETITE OR OVEREATING: 0
3. TROUBLE FALLING OR STAYING ASLEEP: 3
9. THOUGHTS THAT YOU WOULD BE BETTER OFF DEAD, OR OF HURTING YOURSELF: 0
2. FEELING DOWN, DEPRESSED OR HOPELESS: 3
6. FEELING BAD ABOUT YOURSELF - OR THAT YOU ARE A FAILURE OR HAVE LET YOURSELF OR YOUR FAMILY DOWN: 2
SUM OF ALL RESPONSES TO PHQ9 QUESTIONS 1 & 2: 5
4. FEELING TIRED OR HAVING LITTLE ENERGY: 3
1. LITTLE INTEREST OR PLEASURE IN DOING THINGS: 2
7. TROUBLE CONCENTRATING ON THINGS, SUCH AS READING THE NEWSPAPER OR WATCHING TELEVISION: 1

## 2019-10-07 ASSESSMENT — ANXIETY QUESTIONNAIRES
6. BECOMING EASILY ANNOYED OR IRRITABLE: 1-SEVERAL DAYS
3. WORRYING TOO MUCH ABOUT DIFFERENT THINGS: 2-OVER HALF THE DAYS
7. FEELING AFRAID AS IF SOMETHING AWFUL MIGHT HAPPEN: 1-SEVERAL DAYS
GAD7 TOTAL SCORE: 9
4. TROUBLE RELAXING: 1-SEVERAL DAYS
2. NOT BEING ABLE TO STOP OR CONTROL WORRYING: 2-OVER HALF THE DAYS
1. FEELING NERVOUS, ANXIOUS, OR ON EDGE: 2-OVER HALF THE DAYS
5. BEING SO RESTLESS THAT IT IS HARD TO SIT STILL: 0-NOT AT ALL SURE

## 2019-10-14 ENCOUNTER — OFFICE VISIT (OUTPATIENT)
Dept: DERMATOLOGY | Age: 49
End: 2019-10-14
Payer: COMMERCIAL

## 2019-10-14 DIAGNOSIS — D22.5 RECURRENT NEVUS OF BACK: ICD-10-CM

## 2019-10-14 DIAGNOSIS — Z86.006 HISTORY OF MELANOMA IN SITU: ICD-10-CM

## 2019-10-14 DIAGNOSIS — L81.4 LENTIGINES: ICD-10-CM

## 2019-10-14 DIAGNOSIS — L82.1 SEBORRHEIC KERATOSES: ICD-10-CM

## 2019-10-14 DIAGNOSIS — D48.9 NEOPLASM OF UNCERTAIN BEHAVIOR: Primary | ICD-10-CM

## 2019-10-14 DIAGNOSIS — D22.9 MULTIPLE BENIGN MELANOCYTIC NEVI: ICD-10-CM

## 2019-10-14 PROCEDURE — G8420 CALC BMI NORM PARAMETERS: HCPCS | Performed by: DERMATOLOGY

## 2019-10-14 PROCEDURE — 11102 TANGNTL BX SKIN SINGLE LES: CPT | Performed by: DERMATOLOGY

## 2019-10-14 PROCEDURE — 99203 OFFICE O/P NEW LOW 30 MIN: CPT | Performed by: DERMATOLOGY

## 2019-10-14 PROCEDURE — G8427 DOCREV CUR MEDS BY ELIG CLIN: HCPCS | Performed by: DERMATOLOGY

## 2019-10-14 PROCEDURE — G8484 FLU IMMUNIZE NO ADMIN: HCPCS | Performed by: DERMATOLOGY

## 2019-10-14 PROCEDURE — 1036F TOBACCO NON-USER: CPT | Performed by: DERMATOLOGY

## 2019-10-16 ENCOUNTER — TELEPHONE (OUTPATIENT)
Dept: DERMATOLOGY | Age: 49
End: 2019-10-16

## 2019-10-21 ENCOUNTER — OFFICE VISIT (OUTPATIENT)
Dept: PSYCHOLOGY | Age: 49
End: 2019-10-21
Payer: COMMERCIAL

## 2019-10-21 DIAGNOSIS — F33.1 MAJOR DEPRESSIVE DISORDER, RECURRENT EPISODE, MODERATE (HCC): Primary | ICD-10-CM

## 2019-10-21 PROCEDURE — 90832 PSYTX W PT 30 MINUTES: CPT | Performed by: PSYCHOLOGIST

## 2019-10-21 ASSESSMENT — PATIENT HEALTH QUESTIONNAIRE - PHQ9
5. POOR APPETITE OR OVEREATING: 0
SUM OF ALL RESPONSES TO PHQ9 QUESTIONS 1 & 2: 6
9. THOUGHTS THAT YOU WOULD BE BETTER OFF DEAD, OR OF HURTING YOURSELF: 0
SUM OF ALL RESPONSES TO PHQ QUESTIONS 1-9: 17
3. TROUBLE FALLING OR STAYING ASLEEP: 3
2. FEELING DOWN, DEPRESSED OR HOPELESS: 3
8. MOVING OR SPEAKING SO SLOWLY THAT OTHER PEOPLE COULD HAVE NOTICED. OR THE OPPOSITE, BEING SO FIGETY OR RESTLESS THAT YOU HAVE BEEN MOVING AROUND A LOT MORE THAN USUAL: 1
6. FEELING BAD ABOUT YOURSELF - OR THAT YOU ARE A FAILURE OR HAVE LET YOURSELF OR YOUR FAMILY DOWN: 3
7. TROUBLE CONCENTRATING ON THINGS, SUCH AS READING THE NEWSPAPER OR WATCHING TELEVISION: 1
SUM OF ALL RESPONSES TO PHQ QUESTIONS 1-9: 17
4. FEELING TIRED OR HAVING LITTLE ENERGY: 3
1. LITTLE INTEREST OR PLEASURE IN DOING THINGS: 3

## 2019-10-21 ASSESSMENT — ANXIETY QUESTIONNAIRES
4. TROUBLE RELAXING: 2-OVER HALF THE DAYS
6. BECOMING EASILY ANNOYED OR IRRITABLE: 1-SEVERAL DAYS
2. NOT BEING ABLE TO STOP OR CONTROL WORRYING: 3-NEARLY EVERY DAY
7. FEELING AFRAID AS IF SOMETHING AWFUL MIGHT HAPPEN: 1-SEVERAL DAYS
5. BEING SO RESTLESS THAT IT IS HARD TO SIT STILL: 1-SEVERAL DAYS
3. WORRYING TOO MUCH ABOUT DIFFERENT THINGS: 3-NEARLY EVERY DAY
GAD7 TOTAL SCORE: 14
1. FEELING NERVOUS, ANXIOUS, OR ON EDGE: 3-NEARLY EVERY DAY

## 2019-10-24 ENCOUNTER — TELEPHONE (OUTPATIENT)
Dept: DERMATOLOGY | Age: 49
End: 2019-10-24

## 2019-11-04 ENCOUNTER — OFFICE VISIT (OUTPATIENT)
Dept: PSYCHOLOGY | Age: 49
End: 2019-11-04
Payer: COMMERCIAL

## 2019-11-04 DIAGNOSIS — F33.1 MAJOR DEPRESSIVE DISORDER, RECURRENT EPISODE, MODERATE (HCC): Primary | ICD-10-CM

## 2019-11-04 PROCEDURE — 90832 PSYTX W PT 30 MINUTES: CPT | Performed by: PSYCHOLOGIST

## 2019-11-04 ASSESSMENT — PATIENT HEALTH QUESTIONNAIRE - PHQ9
4. FEELING TIRED OR HAVING LITTLE ENERGY: 3
8. MOVING OR SPEAKING SO SLOWLY THAT OTHER PEOPLE COULD HAVE NOTICED. OR THE OPPOSITE, BEING SO FIGETY OR RESTLESS THAT YOU HAVE BEEN MOVING AROUND A LOT MORE THAN USUAL: 0
SUM OF ALL RESPONSES TO PHQ9 QUESTIONS 1 & 2: 5
3. TROUBLE FALLING OR STAYING ASLEEP: 3
7. TROUBLE CONCENTRATING ON THINGS, SUCH AS READING THE NEWSPAPER OR WATCHING TELEVISION: 1
SUM OF ALL RESPONSES TO PHQ QUESTIONS 1-9: 15
9. THOUGHTS THAT YOU WOULD BE BETTER OFF DEAD, OR OF HURTING YOURSELF: 0
SUM OF ALL RESPONSES TO PHQ QUESTIONS 1-9: 15
6. FEELING BAD ABOUT YOURSELF - OR THAT YOU ARE A FAILURE OR HAVE LET YOURSELF OR YOUR FAMILY DOWN: 3
5. POOR APPETITE OR OVEREATING: 0
1. LITTLE INTEREST OR PLEASURE IN DOING THINGS: 2
2. FEELING DOWN, DEPRESSED OR HOPELESS: 3

## 2019-11-04 ASSESSMENT — ANXIETY QUESTIONNAIRES
4. TROUBLE RELAXING: 2-OVER HALF THE DAYS
1. FEELING NERVOUS, ANXIOUS, OR ON EDGE: 2-OVER HALF THE DAYS
5. BEING SO RESTLESS THAT IT IS HARD TO SIT STILL: 1-SEVERAL DAYS
3. WORRYING TOO MUCH ABOUT DIFFERENT THINGS: 3-NEARLY EVERY DAY
2. NOT BEING ABLE TO STOP OR CONTROL WORRYING: 3-NEARLY EVERY DAY
7. FEELING AFRAID AS IF SOMETHING AWFUL MIGHT HAPPEN: 1-SEVERAL DAYS
6. BECOMING EASILY ANNOYED OR IRRITABLE: 1-SEVERAL DAYS
GAD7 TOTAL SCORE: 13

## 2019-12-02 ENCOUNTER — OFFICE VISIT (OUTPATIENT)
Dept: PSYCHOLOGY | Age: 49
End: 2019-12-02
Payer: COMMERCIAL

## 2019-12-02 DIAGNOSIS — F33.1 MAJOR DEPRESSIVE DISORDER, RECURRENT EPISODE, MODERATE (HCC): Primary | ICD-10-CM

## 2019-12-02 PROCEDURE — 90832 PSYTX W PT 30 MINUTES: CPT | Performed by: PSYCHOLOGIST

## 2019-12-02 ASSESSMENT — PATIENT HEALTH QUESTIONNAIRE - PHQ9
SUM OF ALL RESPONSES TO PHQ9 QUESTIONS 1 & 2: 5
3. TROUBLE FALLING OR STAYING ASLEEP: 2
4. FEELING TIRED OR HAVING LITTLE ENERGY: 3
2. FEELING DOWN, DEPRESSED OR HOPELESS: 3
1. LITTLE INTEREST OR PLEASURE IN DOING THINGS: 2
8. MOVING OR SPEAKING SO SLOWLY THAT OTHER PEOPLE COULD HAVE NOTICED. OR THE OPPOSITE, BEING SO FIGETY OR RESTLESS THAT YOU HAVE BEEN MOVING AROUND A LOT MORE THAN USUAL: 0
6. FEELING BAD ABOUT YOURSELF - OR THAT YOU ARE A FAILURE OR HAVE LET YOURSELF OR YOUR FAMILY DOWN: 3
7. TROUBLE CONCENTRATING ON THINGS, SUCH AS READING THE NEWSPAPER OR WATCHING TELEVISION: 1
5. POOR APPETITE OR OVEREATING: 0
9. THOUGHTS THAT YOU WOULD BE BETTER OFF DEAD, OR OF HURTING YOURSELF: 0
SUM OF ALL RESPONSES TO PHQ QUESTIONS 1-9: 14
SUM OF ALL RESPONSES TO PHQ QUESTIONS 1-9: 14

## 2019-12-02 ASSESSMENT — ANXIETY QUESTIONNAIRES
7. FEELING AFRAID AS IF SOMETHING AWFUL MIGHT HAPPEN: 1-SEVERAL DAYS
6. BECOMING EASILY ANNOYED OR IRRITABLE: 2-OVER HALF THE DAYS
4. TROUBLE RELAXING: 2-OVER HALF THE DAYS
2. NOT BEING ABLE TO STOP OR CONTROL WORRYING: 1-SEVERAL DAYS
1. FEELING NERVOUS, ANXIOUS, OR ON EDGE: 2-OVER HALF THE DAYS
5. BEING SO RESTLESS THAT IT IS HARD TO SIT STILL: 1-SEVERAL DAYS
3. WORRYING TOO MUCH ABOUT DIFFERENT THINGS: 2-OVER HALF THE DAYS
GAD7 TOTAL SCORE: 11

## 2020-01-06 ENCOUNTER — OFFICE VISIT (OUTPATIENT)
Dept: PSYCHOLOGY | Age: 50
End: 2020-01-06
Payer: COMMERCIAL

## 2020-01-06 PROCEDURE — 90834 PSYTX W PT 45 MINUTES: CPT | Performed by: PSYCHOLOGIST

## 2020-01-06 ASSESSMENT — ANXIETY QUESTIONNAIRES
7. FEELING AFRAID AS IF SOMETHING AWFUL MIGHT HAPPEN: 1-SEVERAL DAYS
5. BEING SO RESTLESS THAT IT IS HARD TO SIT STILL: 1-SEVERAL DAYS
4. TROUBLE RELAXING: 2-OVER HALF THE DAYS
1. FEELING NERVOUS, ANXIOUS, OR ON EDGE: 2-OVER HALF THE DAYS
GAD7 TOTAL SCORE: 14
6. BECOMING EASILY ANNOYED OR IRRITABLE: 2-OVER HALF THE DAYS
3. WORRYING TOO MUCH ABOUT DIFFERENT THINGS: 3-NEARLY EVERY DAY
2. NOT BEING ABLE TO STOP OR CONTROL WORRYING: 3-NEARLY EVERY DAY

## 2020-01-06 ASSESSMENT — PATIENT HEALTH QUESTIONNAIRE - PHQ9
2. FEELING DOWN, DEPRESSED OR HOPELESS: 3
3. TROUBLE FALLING OR STAYING ASLEEP: 3
SUM OF ALL RESPONSES TO PHQ9 QUESTIONS 1 & 2: 6
5. POOR APPETITE OR OVEREATING: 0
SUM OF ALL RESPONSES TO PHQ QUESTIONS 1-9: 17
1. LITTLE INTEREST OR PLEASURE IN DOING THINGS: 3
7. TROUBLE CONCENTRATING ON THINGS, SUCH AS READING THE NEWSPAPER OR WATCHING TELEVISION: 2
9. THOUGHTS THAT YOU WOULD BE BETTER OFF DEAD, OR OF HURTING YOURSELF: 0
8. MOVING OR SPEAKING SO SLOWLY THAT OTHER PEOPLE COULD HAVE NOTICED. OR THE OPPOSITE, BEING SO FIGETY OR RESTLESS THAT YOU HAVE BEEN MOVING AROUND A LOT MORE THAN USUAL: 0
4. FEELING TIRED OR HAVING LITTLE ENERGY: 3
SUM OF ALL RESPONSES TO PHQ QUESTIONS 1-9: 17
6. FEELING BAD ABOUT YOURSELF - OR THAT YOU ARE A FAILURE OR HAVE LET YOURSELF OR YOUR FAMILY DOWN: 3

## 2020-07-31 ENCOUNTER — VIRTUAL VISIT (OUTPATIENT)
Dept: INTERNAL MEDICINE CLINIC | Age: 50
End: 2020-07-31
Payer: COMMERCIAL

## 2020-07-31 DIAGNOSIS — Z11.59 NEED FOR HEPATITIS B SCREENING TEST: ICD-10-CM

## 2020-07-31 DIAGNOSIS — R53.83 FATIGUE, UNSPECIFIED TYPE: ICD-10-CM

## 2020-07-31 LAB
A/G RATIO: 2.4 (ref 1.1–2.2)
ALBUMIN SERPL-MCNC: 4.8 G/DL (ref 3.4–5)
ALP BLD-CCNC: 62 U/L (ref 40–129)
ALT SERPL-CCNC: 17 U/L (ref 10–40)
ANION GAP SERPL CALCULATED.3IONS-SCNC: 13 MMOL/L (ref 3–16)
AST SERPL-CCNC: 19 U/L (ref 15–37)
BILIRUB SERPL-MCNC: 0.8 MG/DL (ref 0–1)
BUN BLDV-MCNC: 13 MG/DL (ref 7–20)
CALCIUM SERPL-MCNC: 9.5 MG/DL (ref 8.3–10.6)
CHLORIDE BLD-SCNC: 101 MMOL/L (ref 99–110)
CO2: 25 MMOL/L (ref 21–32)
CREAT SERPL-MCNC: 0.9 MG/DL (ref 0.9–1.3)
GFR AFRICAN AMERICAN: >60
GFR NON-AFRICAN AMERICAN: >60
GLOBULIN: 2 G/DL
GLUCOSE BLD-MCNC: 99 MG/DL (ref 70–99)
HBV SURFACE AB TITR SER: <3.5 MIU/ML
HCT VFR BLD CALC: 47.2 % (ref 40.5–52.5)
HEMOGLOBIN: 15.7 G/DL (ref 13.5–17.5)
MCH RBC QN AUTO: 29.2 PG (ref 26–34)
MCHC RBC AUTO-ENTMCNC: 33.3 G/DL (ref 31–36)
MCV RBC AUTO: 87.7 FL (ref 80–100)
PDW BLD-RTO: 14.2 % (ref 12.4–15.4)
PLATELET # BLD: 206 K/UL (ref 135–450)
PMV BLD AUTO: 9.6 FL (ref 5–10.5)
POTASSIUM SERPL-SCNC: 4.3 MMOL/L (ref 3.5–5.1)
RBC # BLD: 5.39 M/UL (ref 4.2–5.9)
SEDIMENTATION RATE, ERYTHROCYTE: 1 MM/HR (ref 0–15)
SODIUM BLD-SCNC: 139 MMOL/L (ref 136–145)
TOTAL PROTEIN: 6.8 G/DL (ref 6.4–8.2)
TSH REFLEX: 1.82 UIU/ML (ref 0.27–4.2)
WBC # BLD: 7.3 K/UL (ref 4–11)

## 2020-07-31 PROCEDURE — 99213 OFFICE O/P EST LOW 20 MIN: CPT | Performed by: INTERNAL MEDICINE

## 2020-07-31 PROCEDURE — G8427 DOCREV CUR MEDS BY ELIG CLIN: HCPCS | Performed by: INTERNAL MEDICINE

## 2020-07-31 RX ORDER — FLUTICASONE PROPIONATE 50 MCG
2 SPRAY, SUSPENSION (ML) NASAL DAILY
Qty: 1 BOTTLE | Refills: 5 | Status: SHIPPED | OUTPATIENT
Start: 2020-07-31 | End: 2020-11-09

## 2020-07-31 NOTE — Clinical Note
Hiv test - please pull into HM no loss of consciousness, no gait abnormality, no headache, no sensory deficits, and no weakness.

## 2020-07-31 NOTE — PROGRESS NOTES
2020    TELEHEALTH EVALUATION -- Audio/Visual (During KNMBT-31 public health emergency)    HPI:    Jayden Huang (:  1970) has requested an audio/video evaluation for the following concern(s):    Patient reports he has been feeling rundown for the past few months. He notes that he has a slight cough and his body temperature has been running lower at 97. He states life has been more stressful working multiple jobs and checking in on his parents. He does have a history of depression and is not certain whether or not this is depression or something else. Review of Systems   Denies weight loss, chest pain,  shortness of breath, positive nasal congestion      Prior to Visit Medications    Medication Sig Taking? Authorizing Provider   fluticasone (FLONASE) 50 MCG/ACT nasal spray 2 sprays by Each Nostril route daily Yes Sergio Jennings MD       Social History     Tobacco Use    Smoking status: Never Smoker    Smokeless tobacco: Never Used   Substance Use Topics    Alcohol use: Yes     Frequency: 2-4 times a month     Comment: 2 beers a week     Drug use: Not on file        Past Medical History:   Diagnosis Date    Allergic rhinitis     Anxiety     Arthritis     Cancer ([address]     Melanoma on left side    Melanoma in situ ([address]     left flank    Prostate calculus     Saw Urology group- Dr. Graham [place]? - had mri  and \"scope\" in     Trigeminal neuralgia of left side of face        PHYSICAL EXAMINATION:    Constitutional: [x] Appears well-developed and well-nourished [x] No apparent distress        Mental status  [x] Alert and awake  [x] Oriented to person/place/time [x]Able to follow commands      Eyes:  EOM    [x]  Normal   Sclera  [x]  Normal     HENT:   [x] Normocephalic, atraumatic.     Mouth/Throat: Mucous membranes are moist.     External Ears [x] Normal      Neck: [x] No visualized mass     Pulmonary/Chest: [x] Respiratory effort normal.  [x] No visualized signs of

## 2020-08-04 DIAGNOSIS — R53.83 FATIGUE, UNSPECIFIED TYPE: ICD-10-CM

## 2020-08-04 LAB — VITAMIN B-12: 467 PG/ML (ref 211–911)

## 2020-10-30 ENCOUNTER — TELEPHONE (OUTPATIENT)
Dept: INTERNAL MEDICINE CLINIC | Age: 50
End: 2020-10-30

## 2020-10-30 NOTE — TELEPHONE ENCOUNTER
Tuesday / Wed  - had low grade fever up to 100.3. Was having arthralgias, tender under jaw. Feels better except residual fatigue and neck/back pain this morning. Temp was normal this morning. Some mild stomach upset. Week previous had sinus headache and dizziness. Still trouble with one ear. Wants to know if can have antibiotics. Advised Covid test today (or Monday if too late) with [address] or this weekend via urgent care[facility]. If negative, will see him in the office next week to further evaluate. If sicker over weekend, advised urgent care.

## 2020-10-30 NOTE — TELEPHONE ENCOUNTER
Patient is requesting to know how he should proceed given the following symptoms which started this past Tuesday\":    He states he temp Tuesday was 100.3, he has Body Aches, Fatigued, Occasional Dry Cough,  Patient Denies:  Muscle Aches, Sore Throat, No loss of taste or smell. Please contact patient at phone # provided with Dr. Carroll Hawkins recommendation.

## 2020-10-30 NOTE — TELEPHONE ENCOUNTER
Patient states he had covid test a couple ago and wants 's opinion before proceeding with appointments that were offered and declined.

## 2020-11-02 ENCOUNTER — OFFICE VISIT (OUTPATIENT)
Dept: PRIMARY CARE CLINIC | Age: 50
End: 2020-11-02
Payer: COMMERCIAL

## 2020-11-02 PROCEDURE — 99211 OFF/OP EST MAY X REQ PHY/QHP: CPT | Performed by: NURSE PRACTITIONER

## 2020-11-02 NOTE — PROGRESS NOTES
[facility] received a viral test for COVID-19. They were educated on isolation and quarantine as appropriate. For any symptoms, they were directed to seek care from their PCP, given contact information to establish with a doctor, directed to an urgent care or the emergency room. Patient sleeping in bed, easily arousable, denies any symptoms at this time   VS stable, will continue to monitor     Lord Mame RN  09/20/18 [id number]

## 2020-11-03 LAB — SARS-COV-2, NAA: NOT DETECTED

## 2020-11-04 NOTE — RESULT ENCOUNTER NOTE

## 2020-11-09 ENCOUNTER — VIRTUAL VISIT (OUTPATIENT)
Dept: INTERNAL MEDICINE CLINIC | Age: 50
End: 2020-11-09
Payer: COMMERCIAL

## 2020-11-09 PROCEDURE — 99213 OFFICE O/P EST LOW 20 MIN: CPT | Performed by: INTERNAL MEDICINE

## 2020-11-09 PROCEDURE — G8427 DOCREV CUR MEDS BY ELIG CLIN: HCPCS | Performed by: INTERNAL MEDICINE

## 2020-11-09 RX ORDER — METHYLPREDNISOLONE 4 MG/1
TABLET ORAL
Qty: 1 KIT | Refills: 0 | Status: SHIPPED | OUTPATIENT
Start: 2020-11-09 | End: 2020-11-15

## 2020-11-09 ASSESSMENT — ENCOUNTER SYMPTOMS
RHINORRHEA: 1
ABDOMINAL PAIN: 0
SORE THROAT: 0
EYE REDNESS: 0
WHEEZING: 0
SHORTNESS OF BREATH: 0
SINUS PAIN: 0
COUGH: 0
CHEST TIGHTNESS: 0

## 2020-11-09 NOTE — PROGRESS NOTES
2020    TELEHEALTH EVALUATION -- Audio/Visual (During YKIFW-80 public health emergency)    HPI:    Sabino Shah (:  1970) has requested an audio/video evaluation for the following concern(s):    Patient has felt unwell for the past 2 weeks since  when he started with intermittent low-grade fevers. He has felt fatigue, and headache and muscle aches, ear congestion, clear drainage from the nose. He got a Covid test on  which was negative but is concerned because he continues to feel unwell. He had also complained of fatigue several months ago but this resolved. Review of Systems   Constitutional: Positive for fatigue and fever. HENT: Positive for ear pain (ear fullness), postnasal drip and rhinorrhea. Negative for sinus pain and sore throat. Eyes: Negative for redness. Respiratory: Negative for cough, chest tightness, shortness of breath and wheezing. Gastrointestinal: Negative for abdominal pain. Neurological: Positive for headaches. Meds:  None      Social History     Tobacco Use    Smoking status: Never Smoker    Smokeless tobacco: Never Used   Substance Use Topics    Alcohol use: Yes     Frequency: 2-4 times a month     Comment: 2 beers a week     Drug use: Not on file        Past Medical History:   Diagnosis Date    Allergic rhinitis     Anxiety     Arthritis     Cancer ([address]     Melanoma on left side    Melanoma in situ ([address]     left flank    Prostate calculus     Saw Urology group- Dr. Cliff Franklin? - had mri  and \"scope\" in     Trigeminal neuralgia of left side of face        PHYSICAL EXAMINATION:    Constitutional: [x] Appears well-developed and well-nourished [x] No apparent distress        Mental status  [x] Alert and awake  [x] Oriented to person/place/time [x]Able to follow commands      Eyes:  EOM    [x]  Normal   Sclera  [x]  Normal     HENT:   [x] Normocephalic, atraumatic.     Mouth/Throat: Mucous membranes are moist. necessary. Patient identification was verified at the start of the visit: {YES    Total time spent on this encounter: {Time Spet: 15 min    Services were provided through a video synchronous discussion virtually to substitute for in-person clinic visit. Patient and provider were located at their individual homes. --Bonilla Bullock MD on 11/9/2020 at 9:53 PM    An electronic signature was used to authenticate this note.

## 2020-11-09 NOTE — PATIENT INSTRUCTIONS
otc deebrox /carbamide peroxide to the right ear    Medrol dose pack    If not better in one week, then will check fever related labs

## 2021-04-01 ENCOUNTER — IMMUNIZATION (OUTPATIENT)
Dept: PRIMARY CARE CLINIC | Age: 51
End: 2021-04-01
Payer: COMMERCIAL

## 2021-04-01 PROCEDURE — 0011A COVID-19, MODERNA VACCINE 100MCG/0.5ML DOSE: CPT | Performed by: FAMILY MEDICINE

## 2021-04-01 PROCEDURE — 91301 COVID-19, MODERNA VACCINE 100MCG/0.5ML DOSE: CPT | Performed by: FAMILY MEDICINE

## 2021-04-29 ENCOUNTER — IMMUNIZATION (OUTPATIENT)
Dept: PRIMARY CARE CLINIC | Age: 51
End: 2021-04-29
Payer: COMMERCIAL

## 2021-04-29 PROCEDURE — 0012A COVID-19, MODERNA VACCINE 100MCG/0.5ML DOSE: CPT | Performed by: FAMILY MEDICINE

## 2021-04-29 PROCEDURE — 91301 COVID-19, MODERNA VACCINE 100MCG/0.5ML DOSE: CPT | Performed by: FAMILY MEDICINE

## 2021-04-30 ENCOUNTER — TELEPHONE (OUTPATIENT)
Dept: INTERNAL MEDICINE CLINIC | Age: 51
End: 2021-04-30

## 2021-04-30 NOTE — TELEPHONE ENCOUNTER
Discussed with patient that this is very likely an expected vaccine reaction to the second Randol Harms. Advised Tylenol, fluids, and rest.  Told him to stay in touch if not improving.

## 2021-04-30 NOTE — TELEPHONE ENCOUNTER
Patient is experiencing side effects from his 2nd COVID vaccine. Fever up to 100.8, Chills, Body aches, Headaches, fatigue. Patient advised to take Tylenol, drink plenty of fluids and rest.  Patient requesting a call back to advise further.

## 2021-05-04 ENCOUNTER — OFFICE VISIT (OUTPATIENT)
Dept: INTERNAL MEDICINE CLINIC | Age: 51
End: 2021-05-04
Payer: COMMERCIAL

## 2021-05-04 VITALS
HEIGHT: 63 IN | HEART RATE: 92 BPM | RESPIRATION RATE: 16 BRPM | SYSTOLIC BLOOD PRESSURE: 122 MMHG | TEMPERATURE: 98.7 F | DIASTOLIC BLOOD PRESSURE: 74 MMHG | OXYGEN SATURATION: 98 % | BODY MASS INDEX: 25.16 KG/M2 | WEIGHT: 142 LBS

## 2021-05-04 DIAGNOSIS — Z11.59 SCREENING FOR VIRAL DISEASE: ICD-10-CM

## 2021-05-04 DIAGNOSIS — Z00.00 ROUTINE CHECK-UP: Primary | ICD-10-CM

## 2021-05-04 DIAGNOSIS — M21.42 FLAT FEET: ICD-10-CM

## 2021-05-04 DIAGNOSIS — L02.811 ABSCESS OF HEAD: ICD-10-CM

## 2021-05-04 DIAGNOSIS — H92.01 OTALGIA OF RIGHT EAR: ICD-10-CM

## 2021-05-04 DIAGNOSIS — Z12.11 SCREENING FOR COLON CANCER: ICD-10-CM

## 2021-05-04 DIAGNOSIS — S68.119D AMPUTATION OF TIP OF FINGER, SUBSEQUENT ENCOUNTER: ICD-10-CM

## 2021-05-04 DIAGNOSIS — M21.41 FLAT FEET: ICD-10-CM

## 2021-05-04 PROCEDURE — 99396 PREV VISIT EST AGE 40-64: CPT | Performed by: INTERNAL MEDICINE

## 2021-05-04 NOTE — PROGRESS NOTES
is alert and oriented to person, place, and time. Cranial Nerves: No cranial nerve deficit. Sensory: No sensory deficit. Coordination: Coordination normal.      Gait: Gait normal.   Psychiatric:         Thought Content: Thought content normal.      Comments: Reduced eye contact               This note was generated completely or in part utilizing Dragon dictation speech recognition software. Occasionally, words are mistranscribed and despite editing, the text may contain inaccuracies due to incorrect word recognition. If further clarification is needed please contact the office at [phone]          An electronic signature was used to authenticate this note.     --Nu Mistry MD

## 2021-05-04 NOTE — PATIENT INSTRUCTIONS
Marvin Brown  [id number]-  Shoe orthotics for flat feet  --  Dr. Tejinder Jessica    See Dermatology for annual exam and \"redness\" at top of head  otc antibacterial cream    Gi/colonoscopy  [phone]    Deebrox carbamide peroxide left ear    [facility]

## 2021-05-27 ENCOUNTER — OFFICE VISIT (OUTPATIENT)
Dept: DERMATOLOGY | Age: 51
End: 2021-05-27
Payer: COMMERCIAL

## 2021-05-27 VITALS — TEMPERATURE: 98.8 F

## 2021-05-27 DIAGNOSIS — D18.01 CHERRY ANGIOMA: ICD-10-CM

## 2021-05-27 DIAGNOSIS — D22.9 MULTIPLE BENIGN NEVI: ICD-10-CM

## 2021-05-27 DIAGNOSIS — L82.1 SEBORRHEIC KERATOSIS: ICD-10-CM

## 2021-05-27 DIAGNOSIS — Z86.006 HISTORY OF MELANOMA IN SITU: ICD-10-CM

## 2021-05-27 DIAGNOSIS — D48.9 NEOPLASM OF UNCERTAIN BEHAVIOR: Primary | ICD-10-CM

## 2021-05-27 PROCEDURE — G8427 DOCREV CUR MEDS BY ELIG CLIN: HCPCS | Performed by: DERMATOLOGY

## 2021-05-27 PROCEDURE — G8420 CALC BMI NORM PARAMETERS: HCPCS | Performed by: DERMATOLOGY

## 2021-05-27 PROCEDURE — 99213 OFFICE O/P EST LOW 20 MIN: CPT | Performed by: DERMATOLOGY

## 2021-05-27 PROCEDURE — 11102 TANGNTL BX SKIN SINGLE LES: CPT | Performed by: DERMATOLOGY

## 2021-05-27 PROCEDURE — 11103 TANGNTL BX SKIN EA SEP/ADDL: CPT | Performed by: DERMATOLOGY

## 2021-05-27 PROCEDURE — 1036F TOBACCO NON-USER: CPT | Performed by: DERMATOLOGY

## 2021-05-27 NOTE — PROGRESS NOTES
patient, hx of melanoma in situ to left inferior lateral back in 2004 s/p surgical excision  - per patient, hx of biopsied mole to  Right inferior lateral back that was benign at time of biopsy.  -hx of solar lentigines    Patient denies past history of NMSC, dysplastic nevi    PFHx: Denies hx of MM or NMSC    ADDITIONAL HISTORY:    I have reviewed past medical and surgical histories, current medications, allergies, social and family histories as documented in the patient's electronic medical record. Family History   Problem Relation Age of Onset    Scoliosis Mother     Hypertension Father     Breast Cancer Sister     Diabetes type 2  Paternal Grandfather      Past Medical History:   Diagnosis Date    Allergic rhinitis     Anxiety     Arthritis     Cancer ([facility] [address]     Melanoma on left side    Lumbosacral radiculopathy at L5     Melanoma in situ [facility])     left flank    Prostate calculus     Urology group- Dr. Anila Monroy - had mri 1/19 and \"scope\" in 9/18    Trigeminal neuralgia of right side of face      Past Surgical History:   Procedure Laterality Date    CYSTOSCOPY  2018    HAND SURGERY Left 1993    left distal digit amputation       Allergies   Allergen Reactions    Codeine      Hives     Penicillins      No outpatient medications have been marked as taking for the 5/27/21 encounter (Office Visit) with Ludwig Saint, DO.        Social History:   Social History     Socioeconomic History    Marital status: Single     Spouse name: Not on file    Number of children: Not on file    Years of education: Not on file    Highest education level: Not on file   Occupational History    Occupation: animation/design   Tobacco Use    Smoking status: Never Smoker    Smokeless tobacco: Never Used   Substance and Sexual Activity    Alcohol use: Yes     Comment: 2 beers a week     Drug use: Never    Sexual activity: Not on file   Other Topics Concern    Not on file   Social History Narrative    Not on file     Social Determinants of Health     Financial Resource Strain:     Difficulty of Paying Living Expenses:    Food Insecurity:     Worried About Running Out of Food in the Last Year:     [address] in the Last Year:    Transportation Needs:     Lack of Transportation (Medical):  Lack of Transportation (Non-Medical):    Physical Activity:     Days of Exercise per Week:     Minutes of Exercise per Session:    Stress:     Feeling of Stress :    Social Connections:     Frequency of Communication with Friends and Family:     Frequency of Social Gatherings with Friends and Family:     Attends [employer]ian Services:     Active Member of Clubs or Organizations:     Attends Club or Organization Meetings:     Marital Status:    Intimate Partner Violence:     Fear of Current or Ex-Partner:     Emotionally Abused:     Physically Abused:     Sexually Abused:        Physical Examination     The following were examined and determined to be normal: Psych/Neuro, Scalp/hair, Head/face, Conjunctivae/eyelids, Gums/teeth/lips, Neck, Breast/axilla/chest, Abdomen, Back, RUE, LUE and RLE. buttocks. Areas covered by underwear garment(s) not examined. The following were examined and determined to be abnormal: LLE and Nails/digits. Kevin phototype: 2    -Constitutional: Well appearing, no acute distress  -Neurological: Alert and oriented X 3  -Mood and Affect: Pleasant  -Lymphatics: No palpable lymph nodes to cervical,submandibular, submaxillary, posterior neck, supraclavicular, axillary, inguinal areas, negative hepatosplenomegaly  Total body skin exam performed, areas examined listed above:   1a. Left posterior thigh-0.6 x 2.2 cm irregular bordered asymmetrical brown linear macule      1b. Plantar surface of hallux of left foot-0.3 x 0.2 cm irregular bordered asymmetrical brown papule with ill-defined features on dermoscopy and pigment extending over ridges and furrows      2.  Scattered on the head,neck, trunk and extremities are multiple well-defined round and oval symmetric smoothly-bordered uniformly brown macules and papules; no bleeding nevi. 3. Right lateral abdomen- solitary stuck-on appearing tan-brown verrucous papule  4. Superior midline abdomen- solitary Bright red well circumscribed papule  5. Left inferior lateral back-Well healed scar at site of MMIS, no evidence of recurrent pigmentation, no nodules on palpation. Assessment and Plan     1. Neoplasm of uncertain behavior    2. Multiple benign nevi    3. Cherry angioma    4. Seborrheic keratosis    5. History of melanoma in situ        1. Neoplasm of uncertain behavior  DDx:  A. Junctional nevus rule out dysplastic nevus  B. Acral nevus rule out acral melanoma   -Discussed possible diagnosis. Patient agreeable to biopsy. Verbal consent obtained after risks (infection, bleeding, scar, dyspigmentation), benefits and alternatives explained. -Area(s) to be biopsied were marked with a surgical pen. Site(s) were cleansed with alcohol. Local anesthesia achieved with 1% lidocaine with epinephrine/sodium bicarbonate. Shave biopsy performed with a razor blade. Hemostasis was achieved with aluminum chloride. The wound(s) were dressed with petrolatum and covered with a bandage. Specimen(s) sent to pathology. Pt educated re: risk of bleeding, infection, scar, discoloration, and wound care instructions.     2. Multiple benign nevi  Benign acquired melanocytic nevi  -Recommend monthly self skin exams   -Educated regarding the ABCDEs of melanoma detection   -Call for any new/changing moles or concerning lesions  -Reviewed sun protective behavior -- sun avoidance during the peak hours of the day, sun-protective clothing (including hat and sunglasses), sunscreen use (water resistant, broad spectrum, SPF at least 30, need for reapplication every 1.5 to 2 hours), avoidance of tanning beds   -Plan: Observation with annual skin checks (earlier if indicated) performed in office to monitor current nevi and to assess for new lesions. 3. Cherry angioma  -Educated patient on benign nature and the potential to develop more cherry angiomas as one ages. -Due to benign nature, no treatment is necessary. Reassurance and observation recommended. 4. Seborrheic keratosis  -Reassurance provided to the patient regarding their chronic and benign nature. No treatment performed      5. History of melanoma in situ  -Well healed scar at site of prior melanoma, no evidence of recurrence  -pt to call if notices any changes in size, shape, color or experiences bleeding/pain/itching of moles  -Recommend follow-up annually with an ophthalmologist.  -understands risk of recurrence, possibility of developing a new melanoma,and the need to monitor skin for changes  -encouraged monthly TBSE self-skin exams and to call office if notices concerning lesions/changes  -discussed sun protection/sun avoidance    RTC 1 year  for TBSE        Return to Clinic: 1 year skin exam  Discussed plan with patient and/or primary caretaker. Patient to call clinic with any questions / concerns. Reviewed proper use and side effects of treatment(s) and/or medication(s) with patient and/or primary caretaker. AVS provided or is available on [employer]     Note is transcribed using voice recognition software. Inadvertent computerized transcription errors may be present.

## 2021-08-02 NOTE — PATIENT INSTRUCTIONS
Kay  [facility] ELIZABETH Lopez  No appt needed    --    Flonase aspirin 81 mg oral tablet: orally once a day  glimepiride 4 mg oral tablet: 1 tab(s) orally once a day  lisinopril 40 mg oral tablet: 1 tab(s) orally once a day  metFORMIN 500 mg oral tablet, extended release: 2 tab(s) orally 2 times a day  rosuvastatin 10 mg oral tablet: 1 tab(s) orally once a day   aspirin 81 mg oral tablet: orally once a day  glimepiride 4 mg oral tablet: 1 tab(s) orally once a day  lisinopril 40 mg oral tablet: 1 tab(s) orally once a day  metFORMIN 500 mg oral tablet, extended release: 2 tab(s) orally 2 times a day. RESTART ON AUGUST 5, 2021.  Metoprolol Succinate ER 25 mg oral tablet, extended release: 1 tab(s) orally once a day   rosuvastatin 20 mg oral tablet: 1 tab(s) orally once a day

## 2021-11-03 ENCOUNTER — OFFICE VISIT (OUTPATIENT)
Dept: INTERNAL MEDICINE CLINIC | Age: 51
End: 2021-11-03
Payer: COMMERCIAL

## 2021-11-03 VITALS
OXYGEN SATURATION: 98 % | BODY MASS INDEX: 25.52 KG/M2 | SYSTOLIC BLOOD PRESSURE: 110 MMHG | WEIGHT: 144 LBS | HEIGHT: 63 IN | HEART RATE: 97 BPM | DIASTOLIC BLOOD PRESSURE: 78 MMHG

## 2021-11-03 DIAGNOSIS — Z00.00 ROUTINE CHECK-UP: Primary | ICD-10-CM

## 2021-11-03 DIAGNOSIS — N50.89 SCROTAL MASS: ICD-10-CM

## 2021-11-03 PROCEDURE — G8482 FLU IMMUNIZE ORDER/ADMIN: HCPCS | Performed by: INTERNAL MEDICINE

## 2021-11-03 PROCEDURE — 1036F TOBACCO NON-USER: CPT | Performed by: INTERNAL MEDICINE

## 2021-11-03 PROCEDURE — 3017F COLORECTAL CA SCREEN DOC REV: CPT | Performed by: INTERNAL MEDICINE

## 2021-11-03 PROCEDURE — G8427 DOCREV CUR MEDS BY ELIG CLIN: HCPCS | Performed by: INTERNAL MEDICINE

## 2021-11-03 PROCEDURE — 90750 HZV VACC RECOMBINANT IM: CPT | Performed by: INTERNAL MEDICINE

## 2021-11-03 PROCEDURE — G8419 CALC BMI OUT NRM PARAM NOF/U: HCPCS | Performed by: INTERNAL MEDICINE

## 2021-11-03 PROCEDURE — 99213 OFFICE O/P EST LOW 20 MIN: CPT | Performed by: INTERNAL MEDICINE

## 2021-11-03 PROCEDURE — 90471 IMMUNIZATION ADMIN: CPT | Performed by: INTERNAL MEDICINE

## 2021-11-03 NOTE — PATIENT INSTRUCTIONS
Dr. Alexy Alfredo- Dermatology    Branden Melgar today  Covid booster early next week    Gi/colon   [id number]    Barron Stokes, Urology group  Get scrotum ultrasound  [id number]

## 2021-11-03 NOTE — PROGRESS NOTES
Chayo Blankenship (:  1970) is a 49 y.o. male, here for evaluation of the following chief complaint(s):    6 Month Follow-Up      ASSESSMENT/PLAN:  1. Routine check-up  -     Comprehensive Metabolic Panel; Future  -     Lipid Panel; Future  -     PSA screening; Future  2. Scrotal mass  -     US SCROTUM AND TESTICLES; Future  3. HM - Shingrix shot today and Covid booster next week    Return in about 6 months (around 5/3/2022). SUBJECTIVE/OBJECTIVE:  HPI   Patient is here to follow-up. He has concerns about a lump of the left scrotum above the testicle. He thinks it has been there for a few months. It is not necessarily tender but it is different from what it used to be and different from the other side. He denies dysuria. Denies penile discharge. He also catches me up on recent doctor visits. He saw the dermatologist regarding some moles but they were found to be noncancerous. He has not yet had colonoscopy due to trouble getting a ride home. Review of Systems  Rare but significant  short lived pain of left upper leg and left arm    Past Medical History:   Diagnosis Date    Allergic rhinitis     Anxiety     Arthritis     Cancer ([facility] [address]     Melanoma on left side    Lumbosacral radiculopathy at L5     Melanoma in situ [facility])     left flank    Prostate calculus     Urology group- Dr. Merary Louis - had mri  and \"scope\" in     Shingl2015    left trunk    Trigeminal neuralgia of right side of face        No current outpatient medications on file. No current facility-administered medications for this visit. Physical Exam  Vitals reviewed. Constitutional:       General: He is not in acute distress. HENT:      Head: Normocephalic and atraumatic. Genitourinary:     Penis: Normal.       Comments: Indurated area above left testicle  Neurological:      General: No focal deficit present. Mental Status: He is alert and oriented to person, place, and time. On this date 11/3/2021 I have spent 25 minutes reviewing previous notes, test results and face to face with the patient discussing the diagnosis and importance of compliance with the treatment plan as well as documenting on the day of the visit. This note was generated completely or in part utilizing Dragon dictation speech recognition software. Occasionally, words are mistranscribed and despite editing, the text may contain inaccuracies due to incorrect word recognition. If further clarification is needed please contact the office at [phone]          An electronic signature was used to authenticate this note.     --Pasha Shetty MD

## 2021-11-09 ENCOUNTER — HOSPITAL ENCOUNTER (OUTPATIENT)
Dept: ULTRASOUND IMAGING | Age: 51
Discharge: HOME | End: 2021-11-09
Payer: COMMERCIAL

## 2021-11-09 DIAGNOSIS — N50.89 SCROTAL MASS: ICD-10-CM

## 2021-11-09 PROCEDURE — 76870 US EXAM SCROTUM: CPT

## 2021-11-17 ENCOUNTER — IMMUNIZATION (OUTPATIENT)
Dept: FAMILY MEDICINE CLINIC | Age: 51
End: 2021-11-17
Payer: COMMERCIAL

## 2021-11-17 PROCEDURE — 91300 COVID-19, PFIZER VACCINE 30MCG/0.3ML DOSE: CPT | Performed by: FAMILY MEDICINE

## 2021-11-17 PROCEDURE — 0004A COVID-19, PFIZER VACCINE 30MCG/0.3ML DOSE: CPT | Performed by: FAMILY MEDICINE

## 2022-05-04 ENCOUNTER — OFFICE VISIT (OUTPATIENT)
Dept: INTERNAL MEDICINE CLINIC | Age: 52
End: 2022-05-04
Payer: COMMERCIAL

## 2022-05-04 VITALS
DIASTOLIC BLOOD PRESSURE: 78 MMHG | OXYGEN SATURATION: 95 % | BODY MASS INDEX: 24.82 KG/M2 | WEIGHT: 141.2 LBS | SYSTOLIC BLOOD PRESSURE: 112 MMHG | HEART RATE: 90 BPM

## 2022-05-04 DIAGNOSIS — F41.1 GENERALIZED ANXIETY DISORDER: ICD-10-CM

## 2022-05-04 DIAGNOSIS — R20.9 SKIN SENSATION DISTURBANCE: ICD-10-CM

## 2022-05-04 DIAGNOSIS — E78.5 HYPERLIPIDEMIA, UNSPECIFIED HYPERLIPIDEMIA TYPE: Primary | ICD-10-CM

## 2022-05-04 DIAGNOSIS — H92.11 EAR DRAINAGE, RIGHT: ICD-10-CM

## 2022-05-04 PROCEDURE — 3017F COLORECTAL CA SCREEN DOC REV: CPT | Performed by: INTERNAL MEDICINE

## 2022-05-04 PROCEDURE — G8427 DOCREV CUR MEDS BY ELIG CLIN: HCPCS | Performed by: INTERNAL MEDICINE

## 2022-05-04 PROCEDURE — 1036F TOBACCO NON-USER: CPT | Performed by: INTERNAL MEDICINE

## 2022-05-04 PROCEDURE — 99214 OFFICE O/P EST MOD 30 MIN: CPT | Performed by: INTERNAL MEDICINE

## 2022-05-04 PROCEDURE — G8420 CALC BMI NORM PARAMETERS: HCPCS | Performed by: INTERNAL MEDICINE

## 2022-05-04 SDOH — ECONOMIC STABILITY: FOOD INSECURITY: WITHIN THE PAST 12 MONTHS, YOU WORRIED THAT YOUR FOOD WOULD RUN OUT BEFORE YOU GOT MONEY TO BUY MORE.: NEVER TRUE

## 2022-05-04 SDOH — ECONOMIC STABILITY: FOOD INSECURITY: WITHIN THE PAST 12 MONTHS, THE FOOD YOU BOUGHT JUST DIDN'T LAST AND YOU DIDN'T HAVE MONEY TO GET MORE.: NEVER TRUE

## 2022-05-04 ASSESSMENT — PATIENT HEALTH QUESTIONNAIRE - PHQ9
3. TROUBLE FALLING OR STAYING ASLEEP: 1
1. LITTLE INTEREST OR PLEASURE IN DOING THINGS: 1
SUM OF ALL RESPONSES TO PHQ QUESTIONS 1-9: 5
SUM OF ALL RESPONSES TO PHQ QUESTIONS 1-9: 5
9. THOUGHTS THAT YOU WOULD BE BETTER OFF DEAD, OR OF HURTING YOURSELF: 0
6. FEELING BAD ABOUT YOURSELF - OR THAT YOU ARE A FAILURE OR HAVE LET YOURSELF OR YOUR FAMILY DOWN: 1
7. TROUBLE CONCENTRATING ON THINGS, SUCH AS READING THE NEWSPAPER OR WATCHING TELEVISION: 0
2. FEELING DOWN, DEPRESSED OR HOPELESS: 1
SUM OF ALL RESPONSES TO PHQ QUESTIONS 1-9: 5
10. IF YOU CHECKED OFF ANY PROBLEMS, HOW DIFFICULT HAVE THESE PROBLEMS MADE IT FOR YOU TO DO YOUR WORK, TAKE CARE OF THINGS AT HOME, OR GET ALONG WITH OTHER PEOPLE: 0
5. POOR APPETITE OR OVEREATING: 0
8. MOVING OR SPEAKING SO SLOWLY THAT OTHER PEOPLE COULD HAVE NOTICED. OR THE OPPOSITE, BEING SO FIGETY OR RESTLESS THAT YOU HAVE BEEN MOVING AROUND A LOT MORE THAN USUAL: 0
SUM OF ALL RESPONSES TO PHQ QUESTIONS 1-9: 5
4. FEELING TIRED OR HAVING LITTLE ENERGY: 1
SUM OF ALL RESPONSES TO PHQ9 QUESTIONS 1 & 2: 2

## 2022-05-04 ASSESSMENT — SOCIAL DETERMINANTS OF HEALTH (SDOH): HOW HARD IS IT FOR YOU TO PAY FOR THE VERY BASICS LIKE FOOD, HOUSING, MEDICAL CARE, AND HEATING?: NOT HARD AT ALL

## 2022-05-04 NOTE — PROGRESS NOTES
Mike Castro (:  1970) is a 49 y.o. male, here for evaluation of the following chief complaint(s):    Follow-up (allgeries, shingles concerns)      ASSESSMENT/PLAN:  1. Hyperlipidemia, unspecified hyperlipidemia type  Counseled patient on low-fat diet. CV risk score is just 4%  -     Lipid Panel; Future  2. Ear drainage, right  I did not observe any abnormalities of the tympanic membrane but there did appear to be a nonhealing scab in the ear canal  -     Ricki Howe MD, Otolaryngology, [facility]  3. Skin sensation disturbance  Trigeminal nerve disorder vs dental issue? -     Ricki Howe MD, Otolaryngology, [facility]  4. Generalized anxiety disorder  HARI score 13. Advised seeing Dr. David Flores. He is not interested in medication but would like to speak with somebody. --  Colonoscopy pending  He will call for Sleep Number appt    Return in about 6 months (around 2022). SUBJECTIVE/OBJECTIVE:  HPI   Patient is here for routine follow-up visit. He reports feeling stressed due to his caregiving responsibilities for his parents and is busy at work. He feels like he is always taking care of other people but not himself. He requests a visit with a therapist.  He declines medication. PHQ-9 score of 5 and HARI score of 13. He plans to follow-up with urology regarding scrotal discomfort. Reminded him to follow-up with dermatology regarding history of melanoma in situ. He reports occasional drainage from the right ear. He reports a sensation of numbness at the right lower cheek. He states it is worse with stress. It is a bit of discomfort over the right upper posterior molar area.     Past Medical History:   Diagnosis Date    Allergic rhinitis     Anxiety     Arthritis     Cancer ([address]     Melanoma on left side    Hyperlipidemia     Lumbosacral radiculopathy at L5     Melanoma in situ ([address]     left flank    Prostate calculus     Urology group- Dr. Keven Oneill - had mri 1/19 and \"scope\" in 9/18    Shingles 2015    left trunk    Trigeminal neuralgia of right side of face        No current outpatient medications on file. No current facility-administered medications for this visit. Physical Exam  Vitals reviewed. Constitutional:       General: He is not in acute distress. HENT:      Head: Normocephalic and atraumatic. Comments: No masses palpated of right cheek     Right Ear: Tympanic membrane normal.      Ears:      Comments: Scab right ear michael  Small effusion left tympanic membrane    Cardiovascular:      Rate and Rhythm: Normal rate and regular rhythm. Pulmonary:      Effort: Pulmonary effort is normal.   Musculoskeletal:      Right lower leg: No edema. Left lower leg: No edema. Neurological:      General: No focal deficit present. Mental Status: He is alert and oriented to person, place, and time. Psychiatric:      Comments: Mildly anxious appearing               This note was generated completely or in part utilizing Dragon dictation speech recognition software. Occasionally, words are mistranscribed and despite editing, the text may contain inaccuracies due to incorrect word recognition. If further clarification is needed please contact the office at [phone]          An electronic signature was used to authenticate this note.     --Shimon Ruiz MD

## 2022-05-04 NOTE — Clinical Note
Let patient know I did speak to dr Roberto Crenshaw who will see patient for counseling for anxiety.

## 2022-05-04 NOTE — PATIENT INSTRUCTIONS
a. [address] [facility] [address] (Psychotherapy or Psychiatry)  o  [facility] (formerly [facility])  i. Accepts most insurances  ii. Many locations  iii. [phone]  iv. [email or web address]  v. Recommend scheduling online because telephone can have long wait times  o   b. Private Insurance/Self-pay                Rohan Fernando. Audrey Lamas  i. Jehovah's witness Psychologist  ii. Private insurance and medicare  iii. [address] [facility], [address] [facility]  iv. [phone]  v. [email or web address]    o [facility]  i. Two locations  ii. [phone]  iii. [email or web address]    o Adult [facility] of [address] [facility] [address] [facility], [address]  ii. [phone]  iii. [email or web address]    o [facility] [place]       i. eInstruction by [employer]. [email or web address]       ii. [phone]  iii. [address] [facility], [address]  iv. 100-155 per session  v. Individual, couples, and group counseling  vi. Do not bill insurance, but can provide you with bills that you can submit to your insurance to try to obtain reimbursement     o [facility] and [address] and [employer]  ii. [address] [facility], [address] [place] [address], [facility] Do [facility] 3  iii. [phone]  iv. [email or web address]  v. [email or web address]    o [facility]  i. Mainly self-pay, but will at times offer services for those with financial limitations  ii. [address], [address]  iii. [address] [place], [place]   iv. [phone]  salma. [email or web address]  vi. [email or web address]. [email or web address]    o [facility]   i. Appears to be mainly self-pay, but may call to see if your insurance is accepted  ii. [address], [address] [facility]  iii. [phone]  iv. [email or web address]    o [address]  They do not bill insurance, but will provide you with a receipt for you to try to get reimbursed  ii. Considered out-of-network providers  iii. [address], [place], [address] [place] [address]  iv. Phone: [phone]  v. [email or web address]/    o [facility]  i. Accept many private insurance plans  ii. Fabio, [place], and Christine Swartz  iii. [phone]  iv. [email or web address]. [employer]    o [facility]  i. Accepts many insurances and also offers self-pay discounts  ii. Many different locations across [place] and [place]  iii. [phone]  iv. www.[email or web address] Efficient    o [address] [facility]  i. Insurance/payment not mentioned on website  ii. [address], [address]ina Soto, [address]  iii. [phone]  iv. Process System Enterprise    o [facility]  i. Insurance/payment not mentioned on website  ii. [address], [facility]  iii. [phone]  iv. Smart Plate    o [facility]  i. Accepts many insurances  ii. [address] [facility], [address]  iii. [phone]  iv. www.[email or web address] Health Bryan HospitalZentyal. [employer]ductor    o [facility]   i. Will provide you with a bill that you can submit to insurance to try to get reimbursed  ii. Likely will be out-of-network provider  iii. [address], Britni [place], [address]  iv. [email or web address]    o [facility]  i. Appears to have a [facility] affiliation  ii. [address], [place], [address] [place] [address]  iii. Maria R [place] location as well  iv. [phone]  v. Pathwright. [address] [place] [address] Accepts many private insurances and Medicare  ii. [address] [facility], [address] [place] [address], [facility]  iii. [phone] ext. [address]  Elendg Betancur) Weeks  i. Accepts many private insurances  ii. [address] [facility], [place], [address] [place] [address]  iii. [address] [facility] Accepts Medicare and other private insurances  ii.  [address] [place] [address] , [place], [place] [address]  iii. [phone]  iv. [email or web address]    o Ray Cleveland  i. Accepts medicare and many private insurances  ii. [address], [address] [place] [address]  iii. [phone]    o NoInsuranceAgent.Sharethrough. CarDomain Network/us/psychiatrists/oh/fren  i.  Can utilize this website and filter by location and insurance    --  Schedule nurse visit for Shingrix during 5/22    Call urology for annual appointment    [facility] - angi montes melanoma and also ear scabs  [phone]  Bruce Patel    See your dentist for cheek numbness  Dr. Prince Right, ENT if no cause found there

## 2022-05-09 ENCOUNTER — NURSE ONLY (OUTPATIENT)
Dept: INTERNAL MEDICINE CLINIC | Age: 52
End: 2022-05-09
Payer: COMMERCIAL

## 2022-05-09 PROCEDURE — 90471 IMMUNIZATION ADMIN: CPT | Performed by: INTERNAL MEDICINE

## 2022-05-09 PROCEDURE — 90750 HZV VACC RECOMBINANT IM: CPT | Performed by: INTERNAL MEDICINE

## 2022-05-31 ENCOUNTER — TELEPHONE (OUTPATIENT)
Dept: INTERNAL MEDICINE CLINIC | Age: 52
End: 2022-05-31

## 2022-05-31 NOTE — TELEPHONE ENCOUNTER
Called patient. Message is that he needs to reschedule his appointment with Dr. Rashel Murray so not sure why this was sent to me. Please call him to reschedule.

## 2022-05-31 NOTE — TELEPHONE ENCOUNTER
----- Message from Princess Paul sent at 5/31/2022  9:06 AM EDT -----  Subject: Message to Provider    QUESTIONS  Information for Provider? Pt needs a call back regarding psychology apt   patient thought had thursdday with Dr Jose Sarabia. Wants a call back to   discuss. ---------------------------------------------------------------------------  --------------  Devora WARREN  What is the best way for the office to contact you? OK to leave message on   voicemail  Preferred Call Back Phone Number? [phone]  ---------------------------------------------------------------------------  --------------  SCRIPT ANSWERS  Relationship to Patient?  Self

## 2022-07-18 ENCOUNTER — OFFICE VISIT (OUTPATIENT)
Dept: ENT CLINIC | Age: 52
End: 2022-07-18
Payer: COMMERCIAL

## 2022-07-18 VITALS
HEART RATE: 87 BPM | DIASTOLIC BLOOD PRESSURE: 82 MMHG | HEIGHT: 63 IN | WEIGHT: 139 LBS | BODY MASS INDEX: 24.63 KG/M2 | SYSTOLIC BLOOD PRESSURE: 131 MMHG

## 2022-07-18 DIAGNOSIS — H60.8X3 CHRONIC ECZEMATOUS OTITIS EXTERNA OF BOTH EARS: Primary | ICD-10-CM

## 2022-07-18 PROCEDURE — 1036F TOBACCO NON-USER: CPT | Performed by: OTOLARYNGOLOGY

## 2022-07-18 PROCEDURE — G8420 CALC BMI NORM PARAMETERS: HCPCS | Performed by: OTOLARYNGOLOGY

## 2022-07-18 PROCEDURE — 3017F COLORECTAL CA SCREEN DOC REV: CPT | Performed by: OTOLARYNGOLOGY

## 2022-07-18 PROCEDURE — 99202 OFFICE O/P NEW SF 15 MIN: CPT | Performed by: OTOLARYNGOLOGY

## 2022-07-18 PROCEDURE — G8427 DOCREV CUR MEDS BY ELIG CLIN: HCPCS | Performed by: OTOLARYNGOLOGY

## 2022-07-18 ASSESSMENT — ENCOUNTER SYMPTOMS
SORE THROAT: 0
DIARRHEA: 0
VOICE CHANGE: 0
EYE REDNESS: 0
CHOKING: 0
NAUSEA: 0
EYE ITCHING: 0
SINUS PAIN: 0
COUGH: 0
TROUBLE SWALLOWING: 0
SHORTNESS OF BREATH: 0
EYE PAIN: 0
SINUS PRESSURE: 0
FACIAL SWELLING: 0
RHINORRHEA: 0

## 2022-07-18 NOTE — PROGRESS NOTES
Subjective:      Patient ID: Santiago Ladd is a 49 y.o. male. HPI  Hearing Loss HPI  CC: ear    General: Tamara Archer is a(n) 49 y.o. male who presents with right ear wetness. Occasional itching. Uses qtips regularly. No issues with ears as a child.    How long: A few months  Side:right  Prior audiogram:No  Previous episodes: no  Tinnitus:No  Otorrhea:No  Vertigo:No  Prior ear surgery: No  Ear trauma: No  History of hearing loss: No      Patient Active Problem List   Diagnosis    Anxiety    Ulnar neuropathy of left upper extremity    Major depressive disorder, recurrent episode, moderate (HCC)    Melanoma in situ ([facility] [address]     Past Surgical History:   Procedure Laterality Date    CYSTOSCOPY  2018    HAND SURGERY Left 1993    left distal digit amputation     Family History   Problem Relation Age of Onset    Scoliosis Mother     Hypertension Father     Breast Cancer Sister     Diabetes type 2  Paternal Grandfather      Social History     Socioeconomic History    Marital status: Single     Spouse name: Not on file    Number of children: Not on file    Years of education: Not on file    Highest education level: Not on file   Occupational History    Occupation: animation/design   Tobacco Use    Smoking status: Never    Smokeless tobacco: Never   Substance and Sexual Activity    Alcohol use: Yes     Comment: 2 beers a week     Drug use: Never    Sexual activity: Not on file   Other Topics Concern    Not on file   Social History Narrative    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     Social Determinants of Health     Financial Resource Strain: Low Risk     Difficulty of Paying Living Expenses: Not hard at all   Food Insecurity: No Food Insecurity    Worried About Running Out of Food in the Last Year: Never true    Ran Out of Food in the Last Year: Never true   Transportation Needs: Not on file   Physical Activity: Not on file   Stress: Not on file   Social Connections: Not on file   Intimate Partner Violence: Not on file   Housing Stability: Not on file       DRUG/FOOD ALLERGIES: Codeine, Penicillins, and Pollen extract-tree extract [pollen extract]    CURRENT MEDICATIONS  Prior to Admission medications    Not on File       Lab Studies:  Lab Results   Component Value Date    WBC 7.3 07/31/2020    HGB 15.7 07/31/2020    HCT 47.2 07/31/2020    MCV 87.7 07/31/2020     07/31/2020     Lab Results   Component Value Date    GLUCOSE 92 11/03/2021    BUN 13 11/03/2021    CREATININE 1.0 11/03/2021    K 4.8 11/03/2021     11/03/2021     11/03/2021    CALCIUM 10.0 11/03/2021     No results found for: MG  No results found for: PHOS  Lab Results   Component Value Date    ALKPHOS 67 11/03/2021    ALT 18 11/03/2021    AST 20 11/03/2021    BILITOT 0.5 11/03/2021    PROT 7.1 11/03/2021      Review of Systems   Constitutional:  Negative for activity change, appetite change, chills, fatigue and fever. HENT:  Negative for congestion, ear discharge, ear pain, facial swelling, hearing loss, nosebleeds, postnasal drip, rhinorrhea, sinus pressure, sinus pain, sneezing, sore throat, tinnitus, trouble swallowing and voice change. Eyes:  Negative for pain, redness, itching and visual disturbance. Respiratory:  Negative for cough, choking and shortness of breath. Gastrointestinal:  Negative for diarrhea and nausea. Endocrine: Negative for cold intolerance and heat intolerance. Objective:   Physical Exam  Constitutional:       General: He is not in acute distress. Appearance: He is well-developed. HENT:      Head: Not macrocephalic and not microcephalic. No Roblero's sign, contusion, right periorbital erythema, left periorbital erythema or laceration. Right Ear: Hearing, tympanic membrane, ear canal and external ear normal. No decreased hearing noted. No laceration, drainage, swelling or tenderness. No middle ear effusion. No foreign body. No mastoid tenderness. No hemotympanum.  Tympanic membrane is not injected, perforated,

## 2022-07-28 ENCOUNTER — TELEPHONE (OUTPATIENT)
Dept: INTERNAL MEDICINE CLINIC | Age: 52
End: 2022-07-28

## 2022-07-28 NOTE — TELEPHONE ENCOUNTER
----- Message from Mounika Reggiecesar sent at 7/28/2022  3:33 PM EDT -----  Subject: Message to Provider    QUESTIONS  Information for Provider? Patient has some questions about the second   COVID booster. Patient would like to know if the booster will be offered   in the office   ---------------------------------------------------------------------------  --------------  [id number]  [phone]; OK to leave message on voicemail  ---------------------------------------------------------------------------  --------------  SCRIPT ANSWERS  Relationship to Patient?  Self

## 2022-07-28 NOTE — TELEPHONE ENCOUNTER
Patient advised that we do not administer the covid vaccine in our office.  Advised he can get that at one of the pharmacies

## 2022-11-09 ENCOUNTER — OFFICE VISIT (OUTPATIENT)
Dept: INTERNAL MEDICINE CLINIC | Age: 52
End: 2022-11-09
Payer: COMMERCIAL

## 2022-11-09 VITALS
SYSTOLIC BLOOD PRESSURE: 108 MMHG | OXYGEN SATURATION: 98 % | DIASTOLIC BLOOD PRESSURE: 68 MMHG | BODY MASS INDEX: 24.25 KG/M2 | HEART RATE: 80 BPM | WEIGHT: 138 LBS

## 2022-11-09 DIAGNOSIS — R00.2 PALPITATIONS: ICD-10-CM

## 2022-11-09 DIAGNOSIS — R20.0 RIGHT FACIAL NUMBNESS: ICD-10-CM

## 2022-11-09 DIAGNOSIS — Z12.5 SCREENING PSA (PROSTATE SPECIFIC ANTIGEN): ICD-10-CM

## 2022-11-09 DIAGNOSIS — Z13.220 SCREENING, LIPID: ICD-10-CM

## 2022-11-09 DIAGNOSIS — F41.1 GENERALIZED ANXIETY DISORDER: Primary | ICD-10-CM

## 2022-11-09 PROCEDURE — G8482 FLU IMMUNIZE ORDER/ADMIN: HCPCS | Performed by: INTERNAL MEDICINE

## 2022-11-09 PROCEDURE — 1036F TOBACCO NON-USER: CPT | Performed by: INTERNAL MEDICINE

## 2022-11-09 PROCEDURE — 99214 OFFICE O/P EST MOD 30 MIN: CPT | Performed by: INTERNAL MEDICINE

## 2022-11-09 PROCEDURE — 93000 ELECTROCARDIOGRAM COMPLETE: CPT | Performed by: INTERNAL MEDICINE

## 2022-11-09 PROCEDURE — G8420 CALC BMI NORM PARAMETERS: HCPCS | Performed by: INTERNAL MEDICINE

## 2022-11-09 PROCEDURE — 3017F COLORECTAL CA SCREEN DOC REV: CPT | Performed by: INTERNAL MEDICINE

## 2022-11-09 PROCEDURE — G8427 DOCREV CUR MEDS BY ELIG CLIN: HCPCS | Performed by: INTERNAL MEDICINE

## 2022-11-09 RX ORDER — GABAPENTIN 100 MG/1
100 CAPSULE ORAL 3 TIMES DAILY
Qty: 30 CAPSULE | Refills: 0 | Status: SHIPPED | OUTPATIENT
Start: 2022-11-09 | End: 2022-11-16 | Stop reason: SINTOL

## 2022-11-09 NOTE — PROGRESS NOTES
Raman Ponce (:  1970) is a 47 y.o. male, here for evaluation of the following chief complaint(s):    Follow-up (Colonoscopy done in May, with [address] Report requested) and Stress (Occasional right side facial numbness. Wakes up at night with palpitations)      ASSESSMENT/PLAN:  1. Generalized anxiety disorder  Patient declines medication. Again encouraged him to get established psychologist-Dr. Isac Rowe. 2. Palpitations  -     EKG 12 Lead NSR  -     Comprehensive Metabolic Panel; Future  -     TSH; Future  3. Right facial numbness  -    trial of gabapentin (NEURONTIN) 100 MG capsule; Take 1 capsule by mouth 3 times daily for 10 days. , Disp-30 capsule, R-0Normal  Suspect trigeminal neuralgia. If medication is not effective, consider seeing dentist  4. Screening PSA (prostate specific antigen)  -     PSA Screening; Future  5. Screening, lipid  -     Lipid Panel; Future      Return in about 1 year (around 2023). SUBJECTIVE/OBJECTIVE:  HPI  Patient is here for routine follow-up. He reports still getting numbness of his right lower face. He feels like stress and caffeine make it worse. He continues to feel anxious, especially regarding care for his aging parents. He sometimes gets palpitations in the middle of the night. He denies chest pain or shortness of breath. Review of Systems    Past Medical History:   Diagnosis Date    Allergic rhinitis     Anxiety     Arthritis     Cancer ([facility] [address]     Melanoma on left side    Hyperlipidemia     Lumbosacral radiculopathy at L5     Melanoma in situ [facility])     left flank    Prostate calculus     Urology group- Dr. Julio Moy - had mri  and \"scope\" in     Shingles     left trunk    Trigeminal neuralgia of right side of face        Current Outpatient Medications   Medication Sig Dispense Refill    gabapentin (NEURONTIN) 100 MG capsule Take 1 capsule by mouth 3 times daily for 10 days.  30 capsule 0     No current facility-administered medications for this visit. Physical Exam  Vitals and nursing note reviewed. Constitutional:       General: He is not in acute distress. Appearance: He is well-developed. HENT:      Head: Normocephalic and atraumatic. Right Ear: External ear normal.      Left Ear: External ear normal.   Eyes:      General: No scleral icterus. Extraocular Movements: Extraocular movements intact. Neck:      Thyroid: No thyromegaly. Cardiovascular:      Rate and Rhythm: Normal rate and regular rhythm. Heart sounds: No murmur heard. Pulmonary:      Effort: No respiratory distress. Breath sounds: Normal breath sounds. No wheezing or rales. Abdominal:      General: Bowel sounds are normal. There is no distension. Palpations: Abdomen is soft. Tenderness: There is no abdominal tenderness. Musculoskeletal:         General: No deformity. Normal range of motion. Cervical back: Normal range of motion. Lymphadenopathy:      Cervical: No cervical adenopathy. Skin:     General: Skin is warm and dry. Neurological:      Mental Status: He is alert and oriented to person, place, and time. Cranial Nerves: No cranial nerve deficit. Sensory: No sensory deficit. Coordination: Coordination normal.   Psychiatric:         Thought Content: Thought content normal.             This note was generated completely or in part utilizing Dragon dictation speech recognition software. Occasionally, words are mistranscribed and despite editing, the text may contain inaccuracies due to incorrect word recognition. If further clarification is needed please contact the office at [phone]          An electronic signature was used to authenticate this note.     --Antony Tobias MD

## 2022-11-15 ENCOUNTER — OFFICE VISIT (OUTPATIENT)
Dept: PSYCHOLOGY | Age: 52
End: 2022-11-15
Payer: COMMERCIAL

## 2022-11-15 DIAGNOSIS — F41.8 DEPRESSION WITH ANXIETY: Primary | ICD-10-CM

## 2022-11-15 PROCEDURE — 1036F TOBACCO NON-USER: CPT

## 2022-11-15 PROCEDURE — 90791 PSYCH DIAGNOSTIC EVALUATION: CPT

## 2022-11-15 ASSESSMENT — PATIENT HEALTH QUESTIONNAIRE - PHQ9
SUM OF ALL RESPONSES TO PHQ QUESTIONS 1-9: 11
SUM OF ALL RESPONSES TO PHQ QUESTIONS 1-9: 11
9. THOUGHTS THAT YOU WOULD BE BETTER OFF DEAD, OR OF HURTING YOURSELF: 0
8. MOVING OR SPEAKING SO SLOWLY THAT OTHER PEOPLE COULD HAVE NOTICED. OR THE OPPOSITE, BEING SO FIGETY OR RESTLESS THAT YOU HAVE BEEN MOVING AROUND A LOT MORE THAN USUAL: 0
SUM OF ALL RESPONSES TO PHQ QUESTIONS 1-9: 11
3. TROUBLE FALLING OR STAYING ASLEEP: 2
5. POOR APPETITE OR OVEREATING: 0
1. LITTLE INTEREST OR PLEASURE IN DOING THINGS: 2
10. IF YOU CHECKED OFF ANY PROBLEMS, HOW DIFFICULT HAVE THESE PROBLEMS MADE IT FOR YOU TO DO YOUR WORK, TAKE CARE OF THINGS AT HOME, OR GET ALONG WITH OTHER PEOPLE: 1
2. FEELING DOWN, DEPRESSED OR HOPELESS: 3
SUM OF ALL RESPONSES TO PHQ9 QUESTIONS 1 & 2: 5
4. FEELING TIRED OR HAVING LITTLE ENERGY: 2
6. FEELING BAD ABOUT YOURSELF - OR THAT YOU ARE A FAILURE OR HAVE LET YOURSELF OR YOUR FAMILY DOWN: 2
7. TROUBLE CONCENTRATING ON THINGS, SUCH AS READING THE NEWSPAPER OR WATCHING TELEVISION: 0
SUM OF ALL RESPONSES TO PHQ QUESTIONS 1-9: 11

## 2022-11-15 ASSESSMENT — ANXIETY QUESTIONNAIRES
IF YOU CHECKED OFF ANY PROBLEMS ON THIS QUESTIONNAIRE, HOW DIFFICULT HAVE THESE PROBLEMS MADE IT FOR YOU TO DO YOUR WORK, TAKE CARE OF THINGS AT HOME, OR GET ALONG WITH OTHER PEOPLE: SOMEWHAT DIFFICULT
4. TROUBLE RELAXING: 2
6. BECOMING EASILY ANNOYED OR IRRITABLE: 2
2. NOT BEING ABLE TO STOP OR CONTROL WORRYING: 2
5. BEING SO RESTLESS THAT IT IS HARD TO SIT STILL: 1
7. FEELING AFRAID AS IF SOMETHING AWFUL MIGHT HAPPEN: 1
1. FEELING NERVOUS, ANXIOUS, OR ON EDGE: 2
3. WORRYING TOO MUCH ABOUT DIFFERENT THINGS: 3
GAD7 TOTAL SCORE: 13

## 2022-11-15 NOTE — PROGRESS NOTES
Behavioral Health Consultation  Dwight Ramachandran Psy.D. Psychologist  11/15/2022  7:57 AM EST      Time spent with Patient: 30 minutes  This is patient's first [facility] appointment. Reason for Consult: anxiety/depression  Referring Provider: Veronique Blanchard MD  [id number] [phone]    Pt provided informed consent for the behavioral health program. Discussed with patient model of service to include the limits of confidentiality (i.e. abuse reporting, suicide intervention, etc.) and short-term intervention focused approach. Pt indicated understanding. Feedback given to PCP. S:  Patient presents with concerns about difficulties with stress and mood related to caregiving for aging parents, work. Pt reported that he helps take care of his parents who are 80 and 81 y/o. He described that his siblings live out of town and most of the responsibility falls on him. 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 Feels he does not have his own life outside of work and taking care of his parents. Pt characterized depressive sx as feeling tired and drained, he also described worries about his his mother's short-term memory. Pt described sx have been present for years but have gotten worse recently. Patient finds relief from watching old TV shows to relax. Goals for treatment include improving communication with sisters about stressors and building coping skills for mood improvement. Patient lives by self. Patient works as an . Pt works M-F, noted feeling drained and does not want to do much after work. Daily caffeine use includes 1 cup coffee/AM or around lunchtime, no more than 2 cups per day. No cigarette use, 2 beers once/week alcohol use, no illegal drug use. Patient spends 3 or 4 hrs watching TV. There is no regular exercise but pt noted staying active around house.  Patient describes 6-8 hrs sleep/night, noted frequent awakenings. Described about 4 hrs of quality sleep. Patient enjoys the following hobbies: graphic design, 3D model building, photography, drawing, making tresa structures. 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 Patient identifies with [address] [place], [place] [address]e, has not been to [place]az[place] in two or three years. Denied known familial MH history. Mental Health History  Psychotropic medications: denied  Psychiatric hospitalizations: denied  Suicidal ideation/homicidal ideation: denied current or hx   Suicide attempts: denied  Previous outpatient treatment: group therapy around 2004 for depression; saw psychiatrist around same time; saw psychologists at [address] around 2922-2134 for depression    O:  MSE:    Attitude: cooperative and friendly  Consciousness: alert  Orientation: oriented to person, place, time, general circumstance  Memory: recent and remote memory intact  Attention/Concentration: intact during session  Speech: normal rate and volume, well-articulated  Mood: \"tired\"  Affect: congruent  Perception: within normal limits  Thought Content: within normal limits  Thought Process: logical, coherent and goal-directed  Insight: good  Judgment: intact  Ability to understand instructions: Yes  Ability to respond meaningfully: Yes  Morbid Ideation: no   Suicide Assessment: no suicidal ideation, plan, or intent  Homicidal Ideation: no    History:    Medications:   Current Outpatient Medications   Medication Sig Dispense Refill    gabapentin (NEURONTIN) 100 MG capsule Take 1 capsule by mouth 3 times daily for 10 days. 30 capsule 0     No current facility-administered medications for this visit.      Social History:   Social History     Socioeconomic History    Marital status: Single     Spouse name: Not on file    Number of children: Not on file    Years of education: Not on file    Highest education level: Not on file Occupational History    Occupation: animation/design   Tobacco Use    Smoking status: Never    Smokeless tobacco: Never   Substance and Sexual Activity    Alcohol use: Yes     Comment: 2 beers a week     Drug use: Never    Sexual activity: Not on file   Other Topics Concern    Not on file   Social History Narrative    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     Social Determinants of Health     Financial Resource Strain: Low Risk     Difficulty of Paying Living Expenses: Not hard at all   Food Insecurity: No Food Insecurity    Worried About Running Out of Food in the Last Year: Never true    Ran Out of Food in the Last Year: Never true   Transportation Needs: Not on file   Physical Activity: Not on file   Stress: Not on file   Social Connections: Not on file   Intimate Partner Violence: Not on file   Housing Stability: Not on file     TOBACCO:   reports that he has never smoked. He has never used smokeless tobacco.  ETOH:   reports current alcohol use. Family History:   Family History   Problem Relation Age of Onset    Scoliosis Mother     Hypertension Father     Breast Cancer Sister     Diabetes type 2  Paternal Grandfather        A:  Administered PHQ-9 and HARI-7 (see below). Patient endorses Moderate symptoms of depression and Moderate symptoms of anxiety. Denied suicidal ideation. Insight and motivation are good.       PHQ-9 Questionaire 11/15/2022 5/4/2022 1/6/2020 12/2/2019 11/4/2019 10/21/2019 10/7/2019   Little interest or pleasure in doing things 2 1 3 2 2 3 2   Feeling down, depressed, or hopeless 3 1 3 3 3 3 3   Trouble falling or staying asleep, or sleeping too much 2 1 3 2 3 3 3   Feeling tired or having little energy 2 1 3 3 3 3 3   Poor appetite or overeating 0 0 0 0 0 0 0   Feeling bad about yourself - or that you are a failure or have let yourself or your family down 2 1 3 3 3 3 2   Trouble concentrating on things, such as reading the newspaper or watching television 0 0 2 1 1 1 1   Moving or speaking so slowly that other people could have noticed. Or the opposite - being so fidgety or restless that you have been moving around a lot more than usual 0 0 0 0 0 1 0   Thoughts that you would be better off dead, or of hurting yourself in some way 0 0 0 0 0 0 0   PHQ-9 Total Score 11 5 17 14 15 17 14   If you checked off any problems, how difficult have these problems made it for you to do your work, take care of things at home, or get along with other people? 1 0 - - - - -     PHQ Scores 11/15/2022 5/4/2022 1/6/2020 12/2/2019 11/4/2019 10/21/2019 10/7/2019   PHQ2 Score 5 2 6 5 5 6 5   PHQ9 Score 11 5 17 14 15 17 14       Interpretation of Total Score Depression Severity: 1-4 = Minimal depression, 5-9 = Mild depression, 10-14 = Moderate depression, 15-19 = Moderately severe depression, 20-27 = Severe depression     HARI-7 SCREENING 11/15/2022 1/6/2020 12/2/2019 11/4/2019 10/21/2019 10/7/2019 9/20/2019   Feeling nervous, anxious, or on edge More than half the days - - - - - -   Not being able to stop or control worrying More than half the days - - - - - -   Worrying too much about different things Nearly every day - - - - - -   Trouble relaxing More than half the days - - - - - -   Being so restless that it is hard to sit still Several days - - - - - -   Becoming easily annoyed or irritable More than half the days - - - - - -   Feeling afraid as if something awful might happen Several days - - - - - -   HARI-7 Total Score 13 - - - - - -   How difficult have these problems made it for you to do your work, take care of things at home, or get along with other people?  Somewhat difficult - - - - - -   Feeling nervous, anxious, or on edge - 2-Over half the days 2-Over half the days 2-Over half the days 3-Nearly every day 2-Over half the days 2-Over half the days   Not able to stop or control worrying - 3-Nearly every day 1-Several days 3-Nearly every day 3-Nearly every day 2-Over half the days 2-Over half the days   Worrying too much about different things - 3-Nearly every day 2-Over half the days 3-Nearly every day 3-Nearly every day 2-Over half the days 2-Over half the days   Trouble relaxing - 2-Over half the days 2-Over half the days 2-Over half the days 2-Over half the days 1-Several days 3-Nearly every day   Being so restless that it's hard to sit still - 1-Several days 1-Several days 1-Several days 1-Several days 0-Not at all sure 1-Several days   Becoming easily annoyed or irritable - 2-Over half the days 2-Over half the days 1-Several days 1-Several days 1-Several days 1-Several days   Feeling afraid as if something awful might happen - 1-Several days 1-Several days 1-Several days 1-Several days 1-Several days 1-Several days   HARI-7 Total Score - 14 11 13 14 9 12     AHRI 7 SCORE 11/15/2022 1/6/2020 12/2/2019 11/4/2019 10/21/2019 10/7/2019 9/20/2019   HARI-7 Total Score 13 - - - - - -   HARI-7 Total Score - 14 11 13 14 9 12       Interpretation of Total Score. Anxiety Severity: Score 0-4: Minimal Anxiety. Score 5-9: Mild Anxiety. Score 10-14: Moderate Anxiety. Score greater than 15: Severe Anxiety. Diagnosis:  1. Depression with anxiety        Past Medical History:      Diagnosis Date    Allergic rhinitis     Anxiety     Arthritis     Cancer ([facility] [address]     Melanoma on left side    Hyperlipidemia     Lumbosacral radiculopathy at L5     Melanoma in situ [facility])     left flank    Prostate calculus     Urology group- Dr. Gary Booth - had mri 1/19 and \"scope\" in 9/18    Shingles 2015    left trunk    Trigeminal neuralgia of right side of face        Plan:  Pt interventions:  Discussed and set plan for behavioral activation, Established rapport, Conducted functional assessment, Dorr-setting to identify pt's primary goals for [facility] visit / overall health, Supportive techniques, and Emphasized self-care as important for managing overall health    Pt Behavioral Change Plan:   See Pt Instructions.

## 2022-11-15 NOTE — PATIENT INSTRUCTIONS
Return to see Dr. Rena Toth in 3-4 weeks. Begin plan for behavioral activation by incorporating/scheduling more pleasurable activities/responsibilities into your routine. Leisure Activities Catalogue: The following is a list of activities that might be fun and pleasurable for you. Feel free to add your own fun activities to the list.    1.  Soaking in the bathtub  2. Planning my career  3. Collecting things (coins, shells, etc.)  4. Going on a vacation  5. Recycling old items  6. Relaxing  7. Going on a date  6. Going to a movie  9.  Jogging, walking  10. Listening to music  11. Thinking I have done a full day's work  15. Recalling past parties  15. Buying household gadgets  14. Lying in the sun  15. Planning a career change  16. Laughing  17. Thinking about my past trips  18. Listening to others  23. Reading magazines or newspapers  20. Hobbies (stamp collecting, model building, etc.)  21. Spending an evening with good friends  25. Planning a day's activities  21. Meeting new people  24. Remembering beautiful scenery   22. Saving money  26. Card and board games  27. Going to the gym, doing aerobics  28. Eating  29. Thinking how it will be when I finish school  30. Getting out of debt/paying debts  31. Practicing karate, judo, yoga  32. Thinking about nursing home  35. Playing with Legos  34. Working on my car (bicycle)  35. Remembering the words and deeds of loving people  39. Wearing sexy clothes  37. Having quiet evenings  38. Taking care of my plants  39. Buying, selling stocks and shares  40. Going swimming  41. Doodling, coloring  42. Exercising  43. Collecting old things  40. Going to a party  45. Thinking about buying things  46. Playing golf  47. Playing soccer  48. Flying kites  49. Having discussions with friends  [address]. Having family get-togethers  46. Riding a motor bike or motorcycle  52. Sex  48. Playing or learning a musical instrument  54.   Going camping  55. Singing around the house  64. Arranging flowers  57. Going to [employer], praying (practicing Sabianism)  62. Losing weight   59. Going to the beach  60. Thinking I am an OK person  64. A day with nothing to do  62. Having class reunions  61. Going ice skating, roller skating  64. Going sailing  65. Traveling abroad, interstate, or within the state  77. Sketching, painting  79. Doing something spontaneously  68. Doing embroidery, cross stitching  69. Sleeping  70. Driving  71. Entertaining  72. Going to clubs (garden, selling, etc.)  68. Thinking about getting   76. Going bird watching  75. Singing with groups  76. Flirting  77. Playing musical instruments  78. Doing arts and crafts  78. Making a gift for someone  [de-identified]. Buying CDs, tapes, records  81. Watching boxing, wrestling  82. Planning parties  80. Cooking, baking  84. Going hiking, walking  85. Writing books (pollens, articles)  80. Sewing  87. Buying close  88. Working  89. Going out to dinner  90. Discussing box  91. Sight seeing  80. Gardening  93. Getting a manicure/pedicure  94. Early morning coffee and newspaper  95. Playing tennis  96. Kissing  97. Watching my children play  98. Going to plays and concerts  99. Daydreaming  100. Planning to go to school  101. Thinking about sex  80. Going for a drive  766. Listening to the stereo  104. Refurbishing furniture  105. Watching TV, videos  106. Making lists of tasks  107. Going bike riding  108. Walks [place] the [place]  109. Buying gifts  110. Traveling to [place]  111. Completing a task  112. Thinking about my achievements  113. Going to a sports game  114. Eating delicious food  2:86. Exchanging emails, chatting on the Internet  116. Photography  117. Going fishing  118. Thinking about pleasant events  119. Staying on a diet  120. Start gazing  121. Flying a plane  122. Reading fiction  123. Acting  124.   Being alone  125. Writing a diary, journal entry or letter  126. Cleaning  127. Reading non-fiction  128. Taking children places  129. Dancing  1:30. Going on a picnic  131. Thinking \"I did that pretty well\" after doing something  132. Meditating  133. Playing volleyball  134. Having lunch with a friend  135. Going to the hills  136. Thinking about having a family  80. Thoughts about happy moments in my childhood  138. Splurging  139. Playing cards  140. Solving riddles mentally  141. Having a political discussion  142. Exploring a new area of town  143. Seeing and/or showing photos or slides  144. Knitting/crocheting/quilting  145. Doing crossword puzzles  146. Shooting pool / playing billiards  147. Dressing up and looking nice  148. Reflecting on how I've improved  149. Buying things for myself  150. Talking on the phone  151. Going to museums, [employer]  152. Thinking Pentecostal thoughts  153. Surfing the Internet  154. Lighting candles  155. Listening to the radio  156. Watching ALISTAIR Talks  157. Having coffee at a café  158. Listening to the radio  159. Getting/giving a massage  160. Saying \"I love you\"  161. Thinking about my good qualities  162. Buying books  163. Taking a sauna or steam bath  164. Going skiing  165. Going canoeing or white-water rafting  166. Going bowling  167. Doing woodworking  168. Fantasizing about the future  169. Doing ballet, jazz, tap dancing  170. Debating  171. Playing computer games  172. Having an aquarium  173. Erotica (sex books, movies)  80. Going horseback riding  175. Going rockclimbing  176. Thinking about becoming active in the community  177. Doing something new  178. Making jigsaw puzzles  179. Thinking I'm a person who can cope  180. Playing with my pets  181. Having a barbecue  182. Rearranging the furniture in my house  183. Buying new furniture  184. Going windowshopping  185.   Thinking I have a lot more going for me than most people  186.   Gardening

## 2022-11-16 ENCOUNTER — TELEPHONE (OUTPATIENT)
Dept: INTERNAL MEDICINE CLINIC | Age: 52
End: 2022-11-16

## 2022-11-16 NOTE — TELEPHONE ENCOUNTER
----- Message from Nicole Housejoseph sent at 11/16/2022  1:26 PM EST -----  Subject: Message to Provider    QUESTIONS  Information for Provider? pt is calling regarding has a question on his   medication. gabapentin (NEURONTIN) 100 MG capsule, wants to know if   continue taking this med or not having some side effects ? Been a week now   takes every 8 hours. . Want's a call back ? If should stop taking this ?   irritable side effect.   ---------------------------------------------------------------------------  --------------  Pinky Fisher LXGK  [phone]; OK to leave message on voicemail  ---------------------------------------------------------------------------  --------------  SCRIPT ANSWERS  Relationship to Patient?  Self

## 2022-11-16 NOTE — TELEPHONE ENCOUNTER
----- Message from Calin Hopson sent at 11/16/2022 11:20 AM EST -----  Subject: Medication Problem    Medication: gabapentin (NEURONTIN) 100 MG capsule  Dosage: 3 capsules a day  Ordering Provider: Adamaris Love    Question/Problem: Pt has bee taking meds for a week, and feel like meds   are not working , may be causing side effects such as irritable, or   nervous and will like to know how to stop medication.  Pt will like to know   if he should continue meds, pt will like to stop medication       Pharmacy: [address], [address] [phone]    ---------------------------------------------------------------------------  --------------  Desirae WARREN  [phone]; OK to leave message on voicemail  ---------------------------------------------------------------------------  --------------    SCRIPT ANSWERS  Relationship to Patient: Self

## 2022-11-16 NOTE — TELEPHONE ENCOUNTER
----- Message from Kranthi Grace sent at 11/16/2022  1:26 PM EST -----  Subject: Message to Provider    QUESTIONS  Information for Provider? pt is calling regarding has a question on his   medication. gabapentin (NEURONTIN) 100 MG capsule, wants to know if   continue taking this med or not having some side effects ? Been a week now   takes every 8 hours.. Want's a call back ? If should stop taking this ?   irritable side effect.   ---------------------------------------------------------------------------  --------------  CALL BACK INFO  [phone]; OK to leave message on voicemail  ---------------------------------------------------------------------------  --------------  SCRIPT ANSWERS  Relationship to Patient? Self

## 2022-11-16 NOTE — TELEPHONE ENCOUNTER
: Pt has been taking meds for a week, and feel like meds   are not working , may be causing side effects such as irritable, or   nervous and will like to know how to stop medication.  Pt will like to know   if he should continue meds, pt will like to stop medication

## 2022-12-13 ENCOUNTER — OFFICE VISIT (OUTPATIENT)
Dept: PSYCHOLOGY | Age: 52
End: 2022-12-13

## 2022-12-13 DIAGNOSIS — F41.9 ANXIETY: ICD-10-CM

## 2022-12-13 DIAGNOSIS — F33.1 MODERATE EPISODE OF RECURRENT MAJOR DEPRESSIVE DISORDER (HCC): Primary | ICD-10-CM

## 2022-12-13 ASSESSMENT — ANXIETY QUESTIONNAIRES
IF YOU CHECKED OFF ANY PROBLEMS ON THIS QUESTIONNAIRE, HOW DIFFICULT HAVE THESE PROBLEMS MADE IT FOR YOU TO DO YOUR WORK, TAKE CARE OF THINGS AT HOME, OR GET ALONG WITH OTHER PEOPLE: SOMEWHAT DIFFICULT
6. BECOMING EASILY ANNOYED OR IRRITABLE: 3
1. FEELING NERVOUS, ANXIOUS, OR ON EDGE: 2
7. FEELING AFRAID AS IF SOMETHING AWFUL MIGHT HAPPEN: 2
GAD7 TOTAL SCORE: 14
2. NOT BEING ABLE TO STOP OR CONTROL WORRYING: 2
4. TROUBLE RELAXING: 2
5. BEING SO RESTLESS THAT IT IS HARD TO SIT STILL: 1
3. WORRYING TOO MUCH ABOUT DIFFERENT THINGS: 2

## 2022-12-13 ASSESSMENT — PATIENT HEALTH QUESTIONNAIRE - PHQ9
3. TROUBLE FALLING OR STAYING ASLEEP: 3
SUM OF ALL RESPONSES TO PHQ QUESTIONS 1-9: 14
2. FEELING DOWN, DEPRESSED OR HOPELESS: 3
SUM OF ALL RESPONSES TO PHQ QUESTIONS 1-9: 14
7. TROUBLE CONCENTRATING ON THINGS, SUCH AS READING THE NEWSPAPER OR WATCHING TELEVISION: 0
4. FEELING TIRED OR HAVING LITTLE ENERGY: 3
SUM OF ALL RESPONSES TO PHQ QUESTIONS 1-9: 14
6. FEELING BAD ABOUT YOURSELF - OR THAT YOU ARE A FAILURE OR HAVE LET YOURSELF OR YOUR FAMILY DOWN: 3
9. THOUGHTS THAT YOU WOULD BE BETTER OFF DEAD, OR OF HURTING YOURSELF: 0
10. IF YOU CHECKED OFF ANY PROBLEMS, HOW DIFFICULT HAVE THESE PROBLEMS MADE IT FOR YOU TO DO YOUR WORK, TAKE CARE OF THINGS AT HOME, OR GET ALONG WITH OTHER PEOPLE: 1
5. POOR APPETITE OR OVEREATING: 0
SUM OF ALL RESPONSES TO PHQ QUESTIONS 1-9: 14
SUM OF ALL RESPONSES TO PHQ9 QUESTIONS 1 & 2: 5
8. MOVING OR SPEAKING SO SLOWLY THAT OTHER PEOPLE COULD HAVE NOTICED. OR THE OPPOSITE, BEING SO FIGETY OR RESTLESS THAT YOU HAVE BEEN MOVING AROUND A LOT MORE THAN USUAL: 0
1. LITTLE INTEREST OR PLEASURE IN DOING THINGS: 2

## 2022-12-13 NOTE — PATIENT INSTRUCTIONS
Return to see Dr. Alonzo Mroales in 3-4 weeks. Practice assertive communication/review handout below. Continue incorporating more self-care/pleasurable activities into daily routine. Assertive Communication     Assertiveness Is Simple but Hard    NonAssertive   Assertive   Aggressive     (Passive)            (Tactful)             (Rude)           H onest         X  H onest          X  H onest             X A ppropriate        X  A ppropriate                 A ppropriate             X R espectful        X  R espectful             R espectful     D irect                   X  D irect        X  D irect      Assertiveness involves respecting your rights and the rights of others. Important Facts About Assertiveness      Use I or me statements such as When you do ______, I feel _____.     Voice tone, eye contact, and body posture are important parts of assertive communication. Use a steady and calm voice, stand or sit up straight, look the other person in the eyes without glaring. Feelings are usually only one word (e.g. angry, anxious, happy, sad, hurt, frustrated, joyful)    Remember, assertiveness doesnt guarantee that you will get what you want or that the other person will understand your concerns or be happy with what you said. It does improve the chances that the other person will understand what you want or how you feel and thus improve your chances of communicating effectively. Four Essential Steps to Assertive Communication   1. Tell the person what you think about their behavior without accusing them. 2. Tell them how you feel when they behave a certain way. 3. Tell them how their behavior affects you and your relationship with them. 4. Tell them what you would prefer them to do instead. XYZ* Formula for Effective Communication   The goal of the XYZ* formula is to express the way you feel (internal world) in response to others behavior (external world) in specific situations.  You are the only person who has access to your feelings. Others have no access to your internal world. The only way they will know what you are feeling is if you tell them. Similarly, you only have access to other peoples external world. It is very easy to make a mistake when trying to guess what others are feeling or intending. I feel X  when you do Y  in situation Z  and I would like *    I feel angry  when you leave your socks and underwear on the bedroom floor  after work  and I would like you to put them in the hamper. I felt insignificant  when you left me with an empty gas tank  yesterday  and I would like you to leave the car with at least 1/4 tank of gas. I feel angry  when you dont call me  if you are staying late at work  and I would like you to call as soon as you know you will be late. I feel loved  when you kiss me  when you get home  and I would like you to do that everyday.

## 2023-01-03 ENCOUNTER — OFFICE VISIT (OUTPATIENT)
Dept: PSYCHOLOGY | Age: 53
End: 2023-01-03
Payer: COMMERCIAL

## 2023-01-03 DIAGNOSIS — F41.9 ANXIETY: ICD-10-CM

## 2023-01-03 DIAGNOSIS — F33.1 MODERATE EPISODE OF RECURRENT MAJOR DEPRESSIVE DISORDER (HCC): Primary | ICD-10-CM

## 2023-01-03 PROCEDURE — 90832 PSYTX W PT 30 MINUTES: CPT

## 2023-01-03 PROCEDURE — 1036F TOBACCO NON-USER: CPT

## 2023-01-03 ASSESSMENT — ANXIETY QUESTIONNAIRES
5. BEING SO RESTLESS THAT IT IS HARD TO SIT STILL: 0
1. FEELING NERVOUS, ANXIOUS, OR ON EDGE: 3
6. BECOMING EASILY ANNOYED OR IRRITABLE: 2
4. TROUBLE RELAXING: 2
GAD7 TOTAL SCORE: 15
2. NOT BEING ABLE TO STOP OR CONTROL WORRYING: 3
7. FEELING AFRAID AS IF SOMETHING AWFUL MIGHT HAPPEN: 2
IF YOU CHECKED OFF ANY PROBLEMS ON THIS QUESTIONNAIRE, HOW DIFFICULT HAVE THESE PROBLEMS MADE IT FOR YOU TO DO YOUR WORK, TAKE CARE OF THINGS AT HOME, OR GET ALONG WITH OTHER PEOPLE: SOMEWHAT DIFFICULT
3. WORRYING TOO MUCH ABOUT DIFFERENT THINGS: 3

## 2023-01-03 ASSESSMENT — PATIENT HEALTH QUESTIONNAIRE - PHQ9
3. TROUBLE FALLING OR STAYING ASLEEP: 3
SUM OF ALL RESPONSES TO PHQ9 QUESTIONS 1 & 2: 6
7. TROUBLE CONCENTRATING ON THINGS, SUCH AS READING THE NEWSPAPER OR WATCHING TELEVISION: 1
8. MOVING OR SPEAKING SO SLOWLY THAT OTHER PEOPLE COULD HAVE NOTICED. OR THE OPPOSITE, BEING SO FIGETY OR RESTLESS THAT YOU HAVE BEEN MOVING AROUND A LOT MORE THAN USUAL: 0
10. IF YOU CHECKED OFF ANY PROBLEMS, HOW DIFFICULT HAVE THESE PROBLEMS MADE IT FOR YOU TO DO YOUR WORK, TAKE CARE OF THINGS AT HOME, OR GET ALONG WITH OTHER PEOPLE: 1
1. LITTLE INTEREST OR PLEASURE IN DOING THINGS: 3
SUM OF ALL RESPONSES TO PHQ QUESTIONS 1-9: 16
4. FEELING TIRED OR HAVING LITTLE ENERGY: 3
6. FEELING BAD ABOUT YOURSELF - OR THAT YOU ARE A FAILURE OR HAVE LET YOURSELF OR YOUR FAMILY DOWN: 3
2. FEELING DOWN, DEPRESSED OR HOPELESS: 3
5. POOR APPETITE OR OVEREATING: 0
9. THOUGHTS THAT YOU WOULD BE BETTER OFF DEAD, OR OF HURTING YOURSELF: 0

## 2023-01-03 NOTE — PROGRESS NOTES
Behavioral Health Consultation  HERNANDEZ MCKEON Psy.D. Psychologist  1/3/2023  9:30 AM EST      Time spent with Patient: 30 minutes  This is patient's third  [facility] appointment. Reason for Consult:    Chief Complaint   Patient presents with    Depression    Anxiety     Referring Provider: Surekha Garcia MD  [id number] [phone]    Feedback given to PCP: not indicated at this time. S:  Pt reported feeling more stressed out, tired, and burnt out. Noted he has not been able to communicate with sisters re care surrounding parents, he thinks it would be more beneficial to plan a family meeting around the spring. Reviewed assertive versus passive communication, his goals surrounding family meeting. Pt reported he has been going on walks sometimes after work. Discussed that his sleep has not been good and he does not feel refreshed when he awakens. Discussed exercise, stress management strategies, incorporating enjoyable activities into routine. O:  MSE:    Appearance: good hygiene   Attitude: cooperative and friendly  Consciousness: alert  Orientation: oriented to person, place, time, general circumstance  Memory: recent and remote memory intact  Attention/Concentration: intact during session  Psychomotor Activity:normal  Eye Contact: normal  Speech: normal rate and volume, well-articulated  Mood: \"stressed out\"  Affect: euthymic  Perception: within normal limits  Thought Content: within normal limits  Thought Process: logical, coherent and goal-directed  Insight: good  Judgment: intact  Ability to understand instructions: Yes  Ability to respond meaningfully: Yes  Morbid Ideation: no   Suicide Assessment: no suicidal ideation, plan, or intent  Homicidal Ideation: no    History:    Medications:   No current outpatient medications on file. No current facility-administered medications for this visit.      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 He has never used smokeless tobacco.  ETOH:   reports current alcohol use. Family History:   Family History   Problem Relation Age of Onset    Scoliosis Mother     Hypertension Father     Breast Cancer Sister     Diabetes type 2  Paternal Grandfather        A:  Patient engaged and cooperative. Denied suicidal ideation. Insight and motivation are good. Re-administered PHQ-9 and HARI-7 (see below). Patient endorses Moderately Severe symptoms of depression and Severe symptoms of anxiety.      PHQ-9 Questionaire 1/3/2023 12/13/2022 11/15/2022 5/4/2022 1/6/2020 12/2/2019 11/4/2019   Little interest or pleasure in doing things 3 2 2 1 3 2 2   Feeling down, depressed, or hopeless 3 3 3 1 3 3 3   Trouble falling or staying asleep, or sleeping too much 3 3 2 1 3 2 3   Feeling tired or having little energy 3 3 2 1 3 3 3   Poor appetite or overeating 0 0 0 0 0 0 0   Feeling bad about yourself - or that you are a failure or have let yourself or your family down 3 3 2 1 3 3 3   Trouble concentrating on things, such as reading the newspaper or watching television 1 0 0 0 2 1 1   Moving or speaking so slowly that other people could have noticed. Or the opposite - being so fidgety or restless that you have been moving around a lot more than usual 0 0 0 0 0 0 0   Thoughts that you would be better off dead, or of hurting yourself in some way 0 0 0 0 0 0 0   PHQ-9 Total Score 16 14 11 5 17 14 15   If you checked off any problems, how difficult have these problems made it for you to do your work, take care of things at home, or get along with other people?  1 1 1 0 - - -     PHQ Scores 1/3/2023 12/13/2022 11/15/2022 5/4/2022 1/6/2020 12/2/2019 11/4/2019   PHQ2 Score 6 5 5 2 6 5 5   PHQ9 Score 16 14 11 5 17 14 15       Interpretation of Total Score Depression Severity: 1-4 = Minimal depression, 5-9 = Mild depression, 10-14 = Moderate depression, 15-19 = Moderately severe depression, 20-27 = Severe depression     HARI-7 SCREENING 1/3/2023 12/13/2022 11/15/2022 1/6/2020 12/2/2019 11/4/2019 10/21/2019   Feeling nervous, anxious, or on edge Nearly every day More than half the days More than half the days - - - -   Not being able to stop or control worrying Nearly every day More than half the days More than half the days - - - -   Worrying too much about different things Nearly every day More than half the days Nearly every day - - - -   Trouble relaxing More than half the days More than half the days More than half the days - - - -   Being so restless that it is hard to sit still Not at all Several days Several days - - - -   Becoming easily annoyed or irritable More than half the days Nearly every day More than half the days - - - -   Feeling afraid as if something awful might happen More than half the days More than half the days Several days - - - -   HARI-7 Total Score 15 14 13 - - - -   How difficult have these problems made it for you to do your work, take care of things at home, or get along with other people? Somewhat difficult Somewhat difficult Somewhat difficult - - - -   Feeling nervous, anxious, or on edge - - - 2-Over half the days 2-Over half the days 2-Over half the days 3-Nearly every day   Not able to stop or control worrying - - - 3-Nearly every day 1-Several days 3-Nearly every day 3-Nearly every day   Worrying too much about different things - - - 3-Nearly every day 2-Over half the days 3-Nearly every day 3-Nearly every day   Trouble relaxing - - - 2-Over half the days 2-Over half the days 2-Over half the days 2-Over half the days   Being so restless that it's hard to sit still - - - 1-Several days 1-Several days 1-Several days 1-Several days   Becoming easily annoyed or irritable - - - 2-Over half the days 2-Over half the days 1-Several days 1-Several days   Feeling afraid as if something awful might happen - - - 1-Several days 1-Several days 1-Several days 1-Several days   HARI-7 Total Score - - - 14 11 13 14     HARI 7 SCORE 1/3/2023 12/13/2022 11/15/2022 1/6/2020 12/2/2019 11/4/2019 10/21/2019   HARI-7 Total Score 15 14 13 - - - -   HARI-7 Total Score - - - 14 11 13 14       Interpretation of Total Score. Anxiety Severity: Score 0-4: Minimal Anxiety. Score 5-9: Mild Anxiety. Score 10-14: Moderate Anxiety. Score greater than 15: Severe Anxiety. Diagnosis:    1. Moderate episode of recurrent major depressive disorder ([facility] [address]    2.  Anxiety        Past Medical History      Diagnosis Date    Allergic rhinitis     Anxiety     Arthritis     Cancer ([facility] [address]     Melanoma on left side    Hyperlipidemia     Lumbosacral radiculopathy at L5     Melanoma in situ [facility])     left flank    Prostate calculus     Urology group- Dr. Taniya Stokes - had mri 1/19 and \"scope\" in 9/18    Shingles 2015    left trunk    Trigeminal neuralgia of right side of face        Plan:  Pt interventions:  Trained in improving communication skills, Discussed self-care (sleep, nutrition, rewarding activities, social support, exercise), Supportive techniques, and Problem-solving re: family stressors    Pt Behavioral Change Plan:   See Pt Instructions.

## 2023-02-02 ENCOUNTER — OFFICE VISIT (OUTPATIENT)
Dept: PSYCHOLOGY | Age: 53
End: 2023-02-02
Payer: COMMERCIAL

## 2023-02-02 DIAGNOSIS — F41.9 ANXIETY: ICD-10-CM

## 2023-02-02 DIAGNOSIS — F33.1 MODERATE EPISODE OF RECURRENT MAJOR DEPRESSIVE DISORDER (HCC): Primary | ICD-10-CM

## 2023-02-02 PROCEDURE — 90832 PSYTX W PT 30 MINUTES: CPT

## 2023-02-02 PROCEDURE — 1036F TOBACCO NON-USER: CPT

## 2023-02-02 ASSESSMENT — PATIENT HEALTH QUESTIONNAIRE - PHQ9
4. FEELING TIRED OR HAVING LITTLE ENERGY: 3
SUM OF ALL RESPONSES TO PHQ QUESTIONS 1-9: 15
1. LITTLE INTEREST OR PLEASURE IN DOING THINGS: 3
8. MOVING OR SPEAKING SO SLOWLY THAT OTHER PEOPLE COULD HAVE NOTICED. OR THE OPPOSITE, BEING SO FIGETY OR RESTLESS THAT YOU HAVE BEEN MOVING AROUND A LOT MORE THAN USUAL: 0
SUM OF ALL RESPONSES TO PHQ QUESTIONS 1-9: 15
5. POOR APPETITE OR OVEREATING: 0
2. FEELING DOWN, DEPRESSED OR HOPELESS: 3
SUM OF ALL RESPONSES TO PHQ QUESTIONS 1-9: 15
3. TROUBLE FALLING OR STAYING ASLEEP: 3
SUM OF ALL RESPONSES TO PHQ QUESTIONS 1-9: 15
10. IF YOU CHECKED OFF ANY PROBLEMS, HOW DIFFICULT HAVE THESE PROBLEMS MADE IT FOR YOU TO DO YOUR WORK, TAKE CARE OF THINGS AT HOME, OR GET ALONG WITH OTHER PEOPLE: 1
7. TROUBLE CONCENTRATING ON THINGS, SUCH AS READING THE NEWSPAPER OR WATCHING TELEVISION: 0
9. THOUGHTS THAT YOU WOULD BE BETTER OFF DEAD, OR OF HURTING YOURSELF: 0
SUM OF ALL RESPONSES TO PHQ9 QUESTIONS 1 & 2: 6
6. FEELING BAD ABOUT YOURSELF - OR THAT YOU ARE A FAILURE OR HAVE LET YOURSELF OR YOUR FAMILY DOWN: 3

## 2023-02-02 ASSESSMENT — ANXIETY QUESTIONNAIRES
3. WORRYING TOO MUCH ABOUT DIFFERENT THINGS: 3
7. FEELING AFRAID AS IF SOMETHING AWFUL MIGHT HAPPEN: 2
GAD7 TOTAL SCORE: 17
2. NOT BEING ABLE TO STOP OR CONTROL WORRYING: 3
6. BECOMING EASILY ANNOYED OR IRRITABLE: 2
IF YOU CHECKED OFF ANY PROBLEMS ON THIS QUESTIONNAIRE, HOW DIFFICULT HAVE THESE PROBLEMS MADE IT FOR YOU TO DO YOUR WORK, TAKE CARE OF THINGS AT HOME, OR GET ALONG WITH OTHER PEOPLE: SOMEWHAT DIFFICULT
1. FEELING NERVOUS, ANXIOUS, OR ON EDGE: 3
5. BEING SO RESTLESS THAT IT IS HARD TO SIT STILL: 1
4. TROUBLE RELAXING: 3

## 2023-02-02 NOTE — PATIENT INSTRUCTIONS
Return to see Dr. Leonela Littlejohn in 2-3 weeks  Schedule relaxing and self-care activities to aid in stress management and mood  Practice or roleplay how to assertively communicate to sisters       Assertive Communication     Assertiveness Is Simple but Hard    NonAssertive   Assertive   Aggressive     (Passive)            (Tactful)             (Rude)           H onest         X  H onest          X  H onest             X A ppropriate        X  A ppropriate                 A ppropriate             X R espectful        X  R espectful             R espectful     D irect                   X  D irect        X  D irect      Assertiveness involves respecting your rights and the rights of others. Important Facts About Assertiveness      Use I or me statements such as When you do ______, I feel _____.     Voice tone, eye contact, and body posture are important parts of assertive communication. Use a steady and calm voice, stand or sit up straight, look the other person in the eyes without glaring. Feelings are usually only one word (e.g. angry, anxious, happy, sad, hurt, frustrated, joyful)    Remember, assertiveness doesnt guarantee that you will get what you want or that the other person will understand your concerns or be happy with what you said. It does improve the chances that the other person will understand what you want or how you feel and thus improve your chances of communicating effectively. Four Essential Steps to Assertive Communication   1. Tell the person what you think about their behavior without accusing them. 2. Tell them how you feel when they behave a certain way. 3. Tell them how their behavior affects you and your relationship with them. 4. Tell them what you would prefer them to do instead. XYZ* Formula for Effective Communication   The goal of the XYZ* formula is to express the way you feel (internal world) in response to others behavior (external world) in specific situations. You are the only person who has access to your feelings. Others have no access to your internal world. The only way they will know what you are feeling is if you tell them. Similarly, you only have access to other people’s external world. It is very easy to make a mistake when trying to guess what others are feeling or intending. I feel X  when you do Y  in situation Z  and I would like *    I feel angry  when you leave your socks and underwear on the bedroom floor  after work  and I would like you to put them in the hamper.   I felt insignificant  when you left me with an empty gas tank  yesterday  and I would like you to leave the car with at least 1/4 tank of gas.    I feel angry  when you don’t call me  if you are staying late at work  and I would like you to call as soon as you know you will be late.    I feel loved  when you kiss me  when you get home  and I would like you to do that everyday.

## 2023-02-02 NOTE — PROGRESS NOTES
Behavioral Health Consultation  HERNANDEZ MCKEON Psy.D. Psychologist  2/2/2023  9:09 AM EST      Time spent with Patient: 30 minutes  This is patient's fourth  [facility] appointment. Reason for Consult:    Chief Complaint   Patient presents with    Depression    Anxiety       Referring Provider: Michelle Richey MD  [address] [phone]    Feedback given to PCP: not indicated at this time. S:  Pt reported having a \"rough\" couple of weeks. Continued to feel very stressed. Primary stressors are taking care of his parents and his job. He talked to two of his sisters about their parents and their health with hopes that they would offer to help more. However, he noted he has not shared with them how he feels overall and has not directly asked them for help. Reviewed assertive communication versus passive and aggressive. Discussed actively engaging in stress management strategies. O:  MSE:    Appearance: good hygiene   Attitude: cooperative and friendly  Consciousness: alert  Orientation: oriented to person, place, time, general circumstance  Memory: recent and remote memory intact  Attention/Concentration: intact during session  Psychomotor Activity:normal  Eye Contact: normal  Speech: normal rate and volume, well-articulated  Mood: \"stressed\"  Affect: congruent  Perception: within normal limits  Thought Content: within normal limits  Thought Process: logical, coherent and goal-directed  Insight: good  Judgment: intact  Ability to understand instructions: Yes  Ability to respond meaningfully: Yes  Morbid Ideation: no   Suicide Assessment: no suicidal ideation, plan, or intent  Homicidal Ideation: no    History:    Medications:   No current outpatient medications on file. No current facility-administered medications for this visit.      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 He has never used smokeless tobacco.  ETOH:   reports current alcohol use. Family History:   Family History   Problem Relation Age of Onset    Scoliosis Mother     Hypertension Father     Breast Cancer Sister     Diabetes type 2  Paternal Grandfather        A:  Patient engaged and cooperative. Denied suicidal ideation. Insight and motivation are good. Re-administered PHQ-9 and HARI-7 (see below). Patient endorses Moderately Severe symptoms of depression and Severe symptoms of anxiety.    PHQ-9 Questionaire 2/2/2023 1/3/2023 12/13/2022 11/15/2022 5/4/2022 1/6/2020 12/2/2019   Little interest or pleasure in doing things 3 3 2 2 1 3 2   Feeling down, depressed, or hopeless 3 3 3 3 1 3 3   Trouble falling or staying asleep, or sleeping too much 3 3 3 2 1 3 2   Feeling tired or having little energy 3 3 3 2 1 3 3   Poor appetite or overeating 0 0 0 0 0 0 0   Feeling bad about yourself - or that you are a failure or have let yourself or your family down 3 3 3 2 1 3 3   Trouble concentrating on things, such as reading the newspaper or watching television 0 1 0 0 0 2 1   Moving or speaking so slowly that other people could have noticed. Or the opposite - being so fidgety or restless that you have been moving around a lot more than usual 0 0 0 0 0 0 0   Thoughts that you would be better off dead, or of hurting yourself in some way 0 0 0 0 0 0 0   PHQ-9 Total Score 15 16 14 11 5 17 14   If you checked off any problems, how difficult have these problems made it for you to do your work, take care of things at home, or get along with other people?  1 1 1 1 0 - -     PHQ Scores 2/2/2023 1/3/2023 12/13/2022 11/15/2022 5/4/2022 1/6/2020 12/2/2019   PHQ2 Score 6 6 5 5 2 6 5   PHQ9 Score 15 16 14 11 5 17 14       Interpretation of Total Score Depression Severity: 1-4 = Minimal depression, 5-9 = Mild depression, 10-14 = Moderate depression, 15-19 = Moderately severe depression, 20-27 = Severe depression     HARI-7 SCREENING 2/2/2023 1/3/2023 12/13/2022 11/15/2022 1/6/2020 12/2/2019 11/4/2019   Feeling nervous, anxious, or on edge Nearly every day Nearly every day More than half the days More than half the days - - -   Not being able to stop or control worrying Nearly every day Nearly every day More than half the days More than half the days - - -   Worrying too much about different things Nearly every day Nearly every day More than half the days Nearly every day - - -   Trouble relaxing Nearly every day More than half the days More than half the days More than half the days - - -   Being so restless that it is hard to sit still Several days Not at all Several days Several days - - -   Becoming easily annoyed or irritable More than half the days More than half the days Nearly every day More than half the days - - -   Feeling afraid as if something awful might happen More than half the days More than half the days More than half the days Several days - - -   HARI-7 Total Score 17 15 14 13 - - -   How difficult have these problems made it for you to do your work, take care of things at home, or get along with other people? Somewhat difficult Somewhat difficult Somewhat difficult Somewhat difficult - - -   Feeling nervous, anxious, or on edge - - - - 2-Over half the days 2-Over half the days 2-Over half the days   Not able to stop or control worrying - - - - 3-Nearly every day 1-Several days 3-Nearly every day   Worrying too much about different things - - - - 3-Nearly every day 2-Over half the days 3-Nearly every day   Trouble relaxing - - - - 2-Over half the days 2-Over half the days 2-Over half the days   Being so restless that it's hard to sit still - - - - 1-Several days 1-Several days 1-Several days   Becoming easily annoyed or irritable - - - - 2-Over half the days 2-Over half the days 1-Several days   Feeling afraid as if something awful might happen - - - - 1-Several days 1-Several days 1-Several days   HARI-7 Total Score - - - - 14 11 13     HARI 7 SCORE 2/2/2023 1/3/2023 12/13/2022 11/15/2022 1/6/2020 12/2/2019 11/4/2019   HARI-7 Total Score 17 15 14 13 - - -   HARI-7 Total Score - - - - 14 11 13       Interpretation of Total Score. Anxiety Severity: Score 0-4: Minimal Anxiety. Score 5-9: Mild Anxiety. Score 10-14: Moderate Anxiety. Score greater than 15: Severe Anxiety. Diagnosis:    1. Moderate episode of recurrent major depressive disorder ([facility] [address]    2.  Anxiety        Past Medical History      Diagnosis Date    Allergic rhinitis     Anxiety     Arthritis     Cancer ([facility] [address]     Melanoma on left side    Hyperlipidemia     Lumbosacral radiculopathy at L5     Melanoma in situ [facility])     left flank    Prostate calculus     Urology group- Dr. Zo Staples - had mri 1/19 and \"scope\" in 9/18    Shingles 2015    left trunk    Trigeminal neuralgia of right side of face        Plan:  Pt interventions:  Trained in improving communication skills, Discussed self-care (sleep, nutrition, rewarding activities, social support, exercise), Supportive techniques, Emphasized self-care as important for managing overall health, Provided Psychoeducation re: stress management, and Emphasized importance of regular practice of relaxation strategies to target / promote anxiety and stress management    Pt Behavioral Change Plan:   See Pt Instructions.

## 2023-02-23 ENCOUNTER — TELEPHONE (OUTPATIENT)
Dept: INTERNAL MEDICINE CLINIC | Age: 53
End: 2023-02-23

## 2023-02-23 ENCOUNTER — OFFICE VISIT (OUTPATIENT)
Dept: PSYCHOLOGY | Age: 53
End: 2023-02-23
Payer: COMMERCIAL

## 2023-02-23 DIAGNOSIS — F33.1 MODERATE EPISODE OF RECURRENT MAJOR DEPRESSIVE DISORDER (HCC): Primary | ICD-10-CM

## 2023-02-23 DIAGNOSIS — F41.9 ANXIETY: ICD-10-CM

## 2023-02-23 PROCEDURE — 1036F TOBACCO NON-USER: CPT

## 2023-02-23 PROCEDURE — 90832 PSYTX W PT 30 MINUTES: CPT

## 2023-02-23 ASSESSMENT — PATIENT HEALTH QUESTIONNAIRE - PHQ9
SUM OF ALL RESPONSES TO PHQ9 QUESTIONS 1 & 2: 6
SUM OF ALL RESPONSES TO PHQ QUESTIONS 1-9: 15
9. THOUGHTS THAT YOU WOULD BE BETTER OFF DEAD, OR OF HURTING YOURSELF: 0
10. IF YOU CHECKED OFF ANY PROBLEMS, HOW DIFFICULT HAVE THESE PROBLEMS MADE IT FOR YOU TO DO YOUR WORK, TAKE CARE OF THINGS AT HOME, OR GET ALONG WITH OTHER PEOPLE: 1
1. LITTLE INTEREST OR PLEASURE IN DOING THINGS: 3
SUM OF ALL RESPONSES TO PHQ QUESTIONS 1-9: 15
6. FEELING BAD ABOUT YOURSELF - OR THAT YOU ARE A FAILURE OR HAVE LET YOURSELF OR YOUR FAMILY DOWN: 2
8. MOVING OR SPEAKING SO SLOWLY THAT OTHER PEOPLE COULD HAVE NOTICED. OR THE OPPOSITE, BEING SO FIGETY OR RESTLESS THAT YOU HAVE BEEN MOVING AROUND A LOT MORE THAN USUAL: 0
3. TROUBLE FALLING OR STAYING ASLEEP: 3
4. FEELING TIRED OR HAVING LITTLE ENERGY: 3
SUM OF ALL RESPONSES TO PHQ QUESTIONS 1-9: 15
7. TROUBLE CONCENTRATING ON THINGS, SUCH AS READING THE NEWSPAPER OR WATCHING TELEVISION: 1
2. FEELING DOWN, DEPRESSED OR HOPELESS: 3
5. POOR APPETITE OR OVEREATING: 0
SUM OF ALL RESPONSES TO PHQ QUESTIONS 1-9: 15

## 2023-02-23 ASSESSMENT — ANXIETY QUESTIONNAIRES
5. BEING SO RESTLESS THAT IT IS HARD TO SIT STILL: 0
2. NOT BEING ABLE TO STOP OR CONTROL WORRYING: 3
6. BECOMING EASILY ANNOYED OR IRRITABLE: 2
1. FEELING NERVOUS, ANXIOUS, OR ON EDGE: 3
GAD7 TOTAL SCORE: 15
4. TROUBLE RELAXING: 3
3. WORRYING TOO MUCH ABOUT DIFFERENT THINGS: 3
7. FEELING AFRAID AS IF SOMETHING AWFUL MIGHT HAPPEN: 1
IF YOU CHECKED OFF ANY PROBLEMS ON THIS QUESTIONNAIRE, HOW DIFFICULT HAVE THESE PROBLEMS MADE IT FOR YOU TO DO YOUR WORK, TAKE CARE OF THINGS AT HOME, OR GET ALONG WITH OTHER PEOPLE: SOMEWHAT DIFFICULT

## 2023-02-23 NOTE — TELEPHONE ENCOUNTER
Patient would like to schedule a visit  to discuss concerns he has about right leg weakness and occasional numbness in right cheek (face). These both have been going on for a few months. Weakness in right leg comes and goes as well as numbness in right cheek. Please call him to discuss/schedule.

## 2023-02-23 NOTE — PROGRESS NOTES
Behavioral Health Consultation  HERNANDEZ MCKEON Psy.D. Psychologist  2/23/2023  9:00 AM EST      Time spent with Patient: 30 minutes  This is patient's fifth  [facility] appointment. Reason for Consult:    Chief Complaint   Patient presents with    Depression    Anxiety     Referring Provider: Romana Baumann, MD  [address] [id number]    Feedback given to PCP: not indicated at this time. S:  Pt reported his family plans to have a family meeting in March regarding their parents' health and care. Looking into [place] [facility]. Went on a walk this past Sunday which helped with stress. Having some worry about numbness in cheek area and weakness in his leg. Encouraged him to make PCP appointment. Discussed advocating for himself and focusing on stress management. Discussed problem-solving in regard to parents' care. O:  MSE:    Appearance: good hygiene   Attitude: cooperative and friendly  Consciousness: alert  Orientation: oriented to person, place, time, general circumstance  Memory: recent and remote memory intact  Attention/Concentration: intact during session  Psychomotor Activity:normal  Eye Contact: normal  Speech: normal rate and volume, well-articulated  Mood: \"worn out\"  Affect:  mildly dysphoric  Perception: within normal limits  Thought Content: within normal limits  Thought Process: logical, coherent and goal-directed  Insight: good  Judgment: intact  Ability to understand instructions: Yes  Ability to respond meaningfully: Yes  Morbid Ideation: no   Suicide Assessment: no suicidal ideation, plan, or intent  Homicidal Ideation: no    History:    Medications:   No current outpatient medications on file. No current facility-administered medications for this visit.      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 He has never used smokeless tobacco.  ETOH:   reports current alcohol use. Family History:   Family History   Problem Relation Age of Onset    Scoliosis Mother     Hypertension Father     Breast Cancer Sister     Diabetes type 2  Paternal Grandfather        A:  Patient engaged and cooperative. Denied suicidal ideation. Insight and motivation are good. Re-administered PHQ-9 and HARI-7 (see below). Patient endorses Moderately Severe symptoms of depression and Severe symptoms of anxiety.      PHQ-9 Questionaire 2/23/2023 2/2/2023 1/3/2023 12/13/2022 11/15/2022 5/4/2022 1/6/2020   Little interest or pleasure in doing things 3 3 3 2 2 1 3   Feeling down, depressed, or hopeless 3 3 3 3 3 1 3   Trouble falling or staying asleep, or sleeping too much 3 3 3 3 2 1 3   Feeling tired or having little energy 3 3 3 3 2 1 3   Poor appetite or overeating 0 0 0 0 0 0 0   Feeling bad about yourself - or that you are a failure or have let yourself or your family down 2 3 3 3 2 1 3   Trouble concentrating on things, such as reading the newspaper or watching television 1 0 1 0 0 0 2   Moving or speaking so slowly that other people could have noticed. Or the opposite - being so fidgety or restless that you have been moving around a lot more than usual 0 0 0 0 0 0 0   Thoughts that you would be better off dead, or of hurting yourself in some way 0 0 0 0 0 0 0   PHQ-9 Total Score 15 15 16 14 11 5 17   If you checked off any problems, how difficult have these problems made it for you to do your work, take care of things at home, or get along with other people?  1 1 1 1 1 0 -     PHQ Scores 2/23/2023 2/2/2023 1/3/2023 12/13/2022 11/15/2022 5/4/2022 1/6/2020   PHQ2 Score 6 6 6 5 5 2 6   PHQ9 Score 15 15 16 14 11 5 17       Interpretation of Total Score Depression Severity: 1-4 = Minimal depression, 5-9 = Mild depression, 10-14 = Moderate depression, 15-19 = Moderately severe depression, 20-27 = Severe depression     HARI-7 SCREENING 2/23/2023 2/2/2023 1/3/2023 12/13/2022 11/15/2022 1/6/2020 12/2/2019   Feeling nervous, anxious, or on edge Nearly every day Nearly every day Nearly every day More than half the days More than half the days - -   Not being able to stop or control worrying Nearly every day Nearly every day Nearly every day More than half the days More than half the days - -   Worrying too much about different things Nearly every day Nearly every day Nearly every day More than half the days Nearly every day - -   Trouble relaxing Nearly every day Nearly every day More than half the days More than half the days More than half the days - -   Being so restless that it is hard to sit still Not at all Several days Not at all Several days Several days - -   Becoming easily annoyed or irritable More than half the days More than half the days More than half the days Nearly every day More than half the days - -   Feeling afraid as if something awful might happen Several days More than half the days More than half the days More than half the days Several days - - AHRI-7 Total Score 15 17 15 14 13 - -   How difficult have these problems made it for you to do your work, take care of things at home, or get along with other people? Somewhat difficult Somewhat difficult Somewhat difficult Somewhat difficult Somewhat difficult - -   Feeling nervous, anxious, or on edge - - - - - 2-Over half the days 2-Over half the days   Not able to stop or control worrying - - - - - 3-Nearly every day 1-Several days   Worrying too much about different things - - - - - 3-Nearly every day 2-Over half the days   Trouble relaxing - - - - - 2-Over half the days 2-Over half the days   Being so restless that it's hard to sit still - - - - - 1-Several days 1-Several days   Becoming easily annoyed or irritable - - - - - 2-Over half the days 2-Over half the days   Feeling afraid as if something awful might happen - - - - - 1-Several days 1-Several days   HARI-7 Total Score - - - - - 14 11     HARI 7 SCORE 2/23/2023 2/2/2023 1/3/2023 12/13/2022 11/15/2022 1/6/2020 12/2/2019   HARI-7 Total Score 15 17 15 14 13 - -   HARI-7 Total Score - - - - - 14 11       Interpretation of Total Score. Anxiety Severity: Score 0-4: Minimal Anxiety. Score 5-9: Mild Anxiety. Score 10-14: Moderate Anxiety. Score greater than 15: Severe Anxiety. Diagnosis:    1. Moderate episode of recurrent major depressive disorder ([facility] [address]    2.  Anxiety        Past Medical History      Diagnosis Date    Allergic rhinitis     Anxiety     Arthritis     Cancer ([facility] [address]     Melanoma on left side    Hyperlipidemia     Lumbosacral radiculopathy at L5     Melanoma in situ [facility])     left flank    Prostate calculus     Urology group- Dr. Yuridia Jackson - had mri 1/19 and \"scope\" in 9/18    Shingles 2015    left trunk    Trigeminal neuralgia of right side of face        Plan:  Pt interventions:  Trained in improving communication skills, Discussed self-care (sleep, nutrition, rewarding activities, social support, exercise), Supportive techniques, Emphasized self-care as important for managing overall health, and Provided Psychoeducation re: assertive communication    Pt Behavioral Change Plan:   See Pt Instructions.

## 2023-02-23 NOTE — PATIENT INSTRUCTIONS
Return to see Dr. Avtar Bo in 2-3 weeks  Engage in stress management/self-care activities throughout week  Advocate for self  Identify what you want to communicate or express in family meeting

## 2023-02-24 NOTE — TELEPHONE ENCOUNTER
Since going on for months, I think 3/1 at 1 PM should be okay.  Let me know if symptoms worsen or become more persistent

## 2023-03-01 ENCOUNTER — OFFICE VISIT (OUTPATIENT)
Dept: INTERNAL MEDICINE CLINIC | Age: 53
End: 2023-03-01
Payer: COMMERCIAL

## 2023-03-01 VITALS
SYSTOLIC BLOOD PRESSURE: 110 MMHG | DIASTOLIC BLOOD PRESSURE: 72 MMHG | OXYGEN SATURATION: 96 % | BODY MASS INDEX: 24.11 KG/M2 | HEART RATE: 61 BPM | WEIGHT: 137.2 LBS

## 2023-03-01 DIAGNOSIS — F41.1 GENERALIZED ANXIETY DISORDER: ICD-10-CM

## 2023-03-01 DIAGNOSIS — R20.0 NUMBNESS: Primary | ICD-10-CM

## 2023-03-01 PROCEDURE — 1036F TOBACCO NON-USER: CPT | Performed by: INTERNAL MEDICINE

## 2023-03-01 PROCEDURE — 99213 OFFICE O/P EST LOW 20 MIN: CPT | Performed by: INTERNAL MEDICINE

## 2023-03-01 PROCEDURE — G8420 CALC BMI NORM PARAMETERS: HCPCS | Performed by: INTERNAL MEDICINE

## 2023-03-01 PROCEDURE — G8482 FLU IMMUNIZE ORDER/ADMIN: HCPCS | Performed by: INTERNAL MEDICINE

## 2023-03-01 PROCEDURE — G8427 DOCREV CUR MEDS BY ELIG CLIN: HCPCS | Performed by: INTERNAL MEDICINE

## 2023-03-01 PROCEDURE — 3017F COLORECTAL CA SCREEN DOC REV: CPT | Performed by: INTERNAL MEDICINE

## 2023-03-01 SDOH — ECONOMIC STABILITY: FOOD INSECURITY: WITHIN THE PAST 12 MONTHS, YOU WORRIED THAT YOUR FOOD WOULD RUN OUT BEFORE YOU GOT MONEY TO BUY MORE.: NEVER TRUE

## 2023-03-01 SDOH — ECONOMIC STABILITY: HOUSING INSECURITY
IN THE LAST 12 MONTHS, WAS THERE A TIME WHEN YOU DID NOT HAVE A STEADY PLACE TO SLEEP OR SLEPT IN A SHELTER (INCLUDING NOW)?: NO

## 2023-03-01 SDOH — ECONOMIC STABILITY: FOOD INSECURITY: WITHIN THE PAST 12 MONTHS, THE FOOD YOU BOUGHT JUST DIDN'T LAST AND YOU DIDN'T HAVE MONEY TO GET MORE.: NEVER TRUE

## 2023-03-01 SDOH — ECONOMIC STABILITY: TRANSPORTATION INSECURITY
IN THE PAST 12 MONTHS, HAS LACK OF TRANSPORTATION KEPT YOU FROM MEETINGS, WORK, OR FROM GETTING THINGS NEEDED FOR DAILY LIVING?: NO

## 2023-03-01 SDOH — ECONOMIC STABILITY: INCOME INSECURITY: HOW HARD IS IT FOR YOU TO PAY FOR THE VERY BASICS LIKE FOOD, HOUSING, MEDICAL CARE, AND HEATING?: NOT VERY HARD

## 2023-03-01 NOTE — PROGRESS NOTES
Elyssa Tovar (:  1970) is a 47 y.o. male, here for evaluation of the following chief complaint(s):    Follow-up and Extremity Weakness (In R thigh/Tingling in L foot /Numbness R side of cheek- SX triggered by stress /)      ASSESSMENT/PLAN:  1. Numbness  -    Patient with diffuse symptoms of tingling, weakness, numbness for several months duration. MRI BRAIN W WO CONTRAST; Future  2. Generalized anxiety disorder  Patient has been seeing Dr. Sharath Brooke but thinks he would benefit from also seeing a psychiatrist.  Referral to Dr. Mike Dykes. Return if symptoms worsen or fail to improve. SUBJECTIVE/OBJECTIVE:  HPI  Patient complains of right anterior upper leg weakness without trauma or starting a new exercise. It has been going on for several months. It does not affect his mobility. He also notes left foot tingling during the same time period. He denies hip or knee or back pain. He thinks it may be triggered by stress but is unsure. He continues to get numbness of the right side of his cheek. He saw the dentist who did not see a problem. He notes occasional muscle twitches. He thinks some of his symptoms may be due to anxiety related to taking care of his parents. He previously took SSRI but did not help much. Review of Systems    Past Medical History:   Diagnosis Date    Allergic rhinitis     Anxiety     Arthritis     Cancer ([facility] [address]     Melanoma on left side    Hyperlipidemia     Lumbosacral radiculopathy at L5     Melanoma in situ [facility])     left flank    Prostate calculus     Urology group- Dr. Heriberto Quintana - had mri  and \"scope\" in     Shingles     left trunk    Trigeminal neuralgia of right side of face        Current Outpatient Medications   Medication Sig Dispense Refill    busPIRone (BUSPAR) 5 MG tablet Take 1 tablet by mouth 2 times daily 60 tablet 2     No current facility-administered medications for this visit.        Physical Exam  Vitals and nursing note reviewed. Constitutional:       General: He is not in acute distress. Appearance: He is well-developed. HENT:      Head: Normocephalic and atraumatic. Right Ear: External ear normal.      Left Ear: External ear normal.      Nose: Nose normal.   Eyes:      General: No scleral icterus. Pupils: Pupils are equal, round, and reactive to light. Neck:      Thyroid: No thyromegaly. Cardiovascular:      Rate and Rhythm: Normal rate and regular rhythm. Heart sounds: No murmur heard. Pulmonary:      Effort: No respiratory distress. Breath sounds: Normal breath sounds. No wheezing or rales. Abdominal:      General: Bowel sounds are normal. There is no distension. Palpations: Abdomen is soft. Tenderness: There is no abdominal tenderness. Musculoskeletal:         General: No deformity. Normal range of motion. Cervical back: Normal range of motion. Lymphadenopathy:      Cervical: No cervical adenopathy. Skin:     General: Skin is warm and dry. Neurological:      Mental Status: He is alert and oriented to person, place, and time. Cranial Nerves: No cranial nerve deficit. Sensory: No sensory deficit. Motor: No weakness. Coordination: Coordination normal.      Gait: Gait normal.      Deep Tendon Reflexes: Reflexes normal.   Psychiatric:         Thought Content: Thought content normal.       On this date 3/1/2023 I have spent 20 minutes reviewing previous notes, test results and face to face with the patient discussing the diagnosis and importance of compliance with the treatment plan as well as documenting on the day of the visit. This note was generated completely or in part utilizing Dragon dictation speech recognition software. Occasionally, words are mistranscribed and despite editing, the text may contain inaccuracies due to incorrect word recognition.   If further clarification is needed please contact the office at [phone]          An electronic signature was used to authenticate this note.     --Roni Langley MD

## 2023-03-02 PROBLEM — F41.0 GENERALIZED ANXIETY DISORDER WITH PANIC ATTACKS: Status: ACTIVE | Noted: 2023-03-02

## 2023-03-02 PROBLEM — F41.1 GENERALIZED ANXIETY DISORDER WITH PANIC ATTACKS: Status: ACTIVE | Noted: 2023-03-02

## 2023-03-02 PROBLEM — F40.10 SOCIAL ANXIETY DISORDER: Status: ACTIVE | Noted: 2023-03-02

## 2023-03-16 ENCOUNTER — OFFICE VISIT (OUTPATIENT)
Dept: PSYCHOLOGY | Age: 53
End: 2023-03-16
Payer: COMMERCIAL

## 2023-03-16 DIAGNOSIS — F33.1 MODERATE EPISODE OF RECURRENT MAJOR DEPRESSIVE DISORDER (HCC): Primary | ICD-10-CM

## 2023-03-16 DIAGNOSIS — F41.9 ANXIETY: ICD-10-CM

## 2023-03-16 PROCEDURE — 1036F TOBACCO NON-USER: CPT

## 2023-03-16 PROCEDURE — 90832 PSYTX W PT 30 MINUTES: CPT

## 2023-03-16 ASSESSMENT — PATIENT HEALTH QUESTIONNAIRE - PHQ9
3. TROUBLE FALLING OR STAYING ASLEEP: 3
1. LITTLE INTEREST OR PLEASURE IN DOING THINGS: 3
10. IF YOU CHECKED OFF ANY PROBLEMS, HOW DIFFICULT HAVE THESE PROBLEMS MADE IT FOR YOU TO DO YOUR WORK, TAKE CARE OF THINGS AT HOME, OR GET ALONG WITH OTHER PEOPLE: 1
SUM OF ALL RESPONSES TO PHQ QUESTIONS 1-9: 14
4. FEELING TIRED OR HAVING LITTLE ENERGY: 3
6. FEELING BAD ABOUT YOURSELF - OR THAT YOU ARE A FAILURE OR HAVE LET YOURSELF OR YOUR FAMILY DOWN: 2
8. MOVING OR SPEAKING SO SLOWLY THAT OTHER PEOPLE COULD HAVE NOTICED. OR THE OPPOSITE, BEING SO FIGETY OR RESTLESS THAT YOU HAVE BEEN MOVING AROUND A LOT MORE THAN USUAL: 0
5. POOR APPETITE OR OVEREATING: 0
SUM OF ALL RESPONSES TO PHQ QUESTIONS 1-9: 14
SUM OF ALL RESPONSES TO PHQ9 QUESTIONS 1 & 2: 6
2. FEELING DOWN, DEPRESSED OR HOPELESS: 3
SUM OF ALL RESPONSES TO PHQ QUESTIONS 1-9: 14
9. THOUGHTS THAT YOU WOULD BE BETTER OFF DEAD, OR OF HURTING YOURSELF: 0
7. TROUBLE CONCENTRATING ON THINGS, SUCH AS READING THE NEWSPAPER OR WATCHING TELEVISION: 0
SUM OF ALL RESPONSES TO PHQ QUESTIONS 1-9: 14

## 2023-03-16 ASSESSMENT — ANXIETY QUESTIONNAIRES
7. FEELING AFRAID AS IF SOMETHING AWFUL MIGHT HAPPEN: 2
1. FEELING NERVOUS, ANXIOUS, OR ON EDGE: 3
GAD7 TOTAL SCORE: 17
4. TROUBLE RELAXING: 2
IF YOU CHECKED OFF ANY PROBLEMS ON THIS QUESTIONNAIRE, HOW DIFFICULT HAVE THESE PROBLEMS MADE IT FOR YOU TO DO YOUR WORK, TAKE CARE OF THINGS AT HOME, OR GET ALONG WITH OTHER PEOPLE: SOMEWHAT DIFFICULT
5. BEING SO RESTLESS THAT IT IS HARD TO SIT STILL: 1
6. BECOMING EASILY ANNOYED OR IRRITABLE: 3
3. WORRYING TOO MUCH ABOUT DIFFERENT THINGS: 3
2. NOT BEING ABLE TO STOP OR CONTROL WORRYING: 3

## 2023-03-16 NOTE — PROGRESS NOTES
Behavioral Health Consultation  HERNANDEZ MCKEON Psy.D. Psychologist  3/16/2023  8:58 AM EDT      Time spent with Patient: 30 minutes  This is patient's sixth  [facility] appointment. Reason for Consult:    Chief Complaint   Patient presents with    Depression    Anxiety     Referring Provider: Sonya Gao MD  [address] [phone]    Feedback given to PCP: not indicated at this time. S:  Pt reported he met with psychiatrist and started buspar two weeks ago and is not sure if it has helped his mood yet. Noted his sisters are coming to visit next week and they plan to talk about the care of his parents. Has plans to find an aide or caregiver that can help with his parents at their home. Reviewed assertive communication, advocating for his needs. Pt also feels he is being strung along at work. Unsure what his next steps are in his career. O:  MSE:    Appearance: good hygiene   Attitude: cooperative and friendly  Consciousness: alert  Orientation: oriented to person, place, time, general circumstance  Memory: recent and remote memory intact  Attention/Concentration: intact during session  Psychomotor Activity:normal  Eye Contact: normal  Speech: normal rate and volume, well-articulated  Mood: \"about the same\"  Affect:  mildly dysphoric, anxious  Perception: within normal limits  Thought Content: within normal limits  Thought Process: logical, coherent and goal-directed  Insight: good  Judgment: intact  Ability to understand instructions: Yes  Ability to respond meaningfully: Yes  Morbid Ideation: no   Suicide Assessment: no suicidal ideation, plan, or intent  Homicidal Ideation: no    History:    Medications:   Current Outpatient Medications   Medication Sig Dispense Refill    busPIRone (BUSPAR) 5 MG tablet Take 1 tablet by mouth 2 times daily 60 tablet 2     No current facility-administered medications for this visit.      Social History:   Social History     Socioeconomic History Marital status: Single     Spouse name: Not on file    Number of children: 0    Years of education: Not on file    Highest education level: Bachelor's degree (e.g., BA, AB, BS)   Occupational History    Occupation: animation/design   Tobacco Use    Smoking status: Never    Smokeless tobacco: Never   Substance and Sexual Activity    Alcohol use: Yes     Comment: 2 beers a week     Drug use: Never    Sexual activity: Not Currently     Partners: Female   Other Topics Concern    Not on file   Social History Narrative    The patient currently lives by himself in a home that he owns. He takes care of his aging parents, going over to their place multiple times per week after he gets off from work. 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 No current relationships with the last one being around 2013. Patient has never , no children. No guns in the home, no  service for the patient, and no legal issues at this time. Social Determinants of Health     Financial Resource Strain: Low Risk     Difficulty of Paying Living Expenses: Not very hard   Food Insecurity: No Food Insecurity    Worried About Running Out of Food in the Last Year: Never true    Ran Out of Food in the Last Year: Never true   Transportation Needs: Unknown    Lack of Transportation (Medical): Not on file    Lack of Transportation (Non-Medical): No   Physical Activity: Not on file   Stress: Not on file   Social Connections: Not on file   Intimate Partner Violence: Not on file   Housing Stability: Unknown    Unable to Pay for Housing in the Last Year: Not on file    Number of Places Lived in the Last Year: Not on file    Unstable Housing in the Last Year: No     TOBACCO:   reports that he has never smoked. He has never used smokeless tobacco.  ETOH:   reports current alcohol use.   Family History:   Family History   Problem Relation Age of Onset    Scoliosis Mother     Hypertension Father     Breast Cancer Sister     Diabetes type 2  Paternal Grandfather        A:  Patient engaged and cooperative. Denied suicidal ideation. Insight and motivation are good. Re-administered PHQ-9 and HARI-7 (see below). Patient endorses Moderate symptoms of depression and Severe symptoms of anxiety. PHQ-9 Questionaire 3/16/2023 3/2/2023 2/23/2023 2/2/2023 1/3/2023 12/13/2022 11/15/2022   Little interest or pleasure in doing things 3 3 3 3 3 2 2   Feeling down, depressed, or hopeless 3 3 3 3 3 3 3   Trouble falling or staying asleep, or sleeping too much 3 3 3 3 3 3 2   Feeling tired or having little energy 3 3 3 3 3 3 2   Poor appetite or overeating 0 0 0 0 0 0 0   Feeling bad about yourself - or that you are a failure or have let yourself or your family down 2 2 2 3 3 3 2   Trouble concentrating on things, such as reading the newspaper or watching television 0 0 1 0 1 0 0   Moving or speaking so slowly that other people could have noticed. Or the opposite - being so fidgety or restless that you have been moving around a lot more than usual 0 0 0 0 0 0 0   Thoughts that you would be better off dead, or of hurting yourself in some way 0 0 0 0 0 0 0   PHQ-9 Total Score 14 14 15 15 16 14 11   If you checked off any problems, how difficult have these problems made it for you to do your work, take care of things at home, or get along with other people?  1 1 1 1 1 1 1     PHQ Scores 3/16/2023 3/2/2023 2/23/2023 2/2/2023 1/3/2023 12/13/2022 11/15/2022   PHQ2 Score 6 6 6 6 6 5 5   PHQ9 Score 14 14 15 15 16 14 11       Interpretation of Total Score Depression Severity: 1-4 = Minimal depression, 5-9 = Mild depression, 10-14 = Moderate depression, 15-19 = Moderately severe depression, 20-27 = Severe depression     HARI-7 SCREENING 3/16/2023 3/2/2023 2/23/2023 2/2/2023 1/3/2023 12/13/2022 11/15/2022   Feeling nervous, anxious, or on edge Nearly every day Nearly every day Nearly every day Nearly every day Nearly every day More than half the days More than half the days   Not being able to stop or control worrying Nearly every day Nearly every day Nearly every day Nearly every day Nearly every day More than half the days More than half the days   Worrying too much about different things Nearly every day Nearly every day Nearly every day Nearly every day Nearly every day More than half the days Nearly every day   Trouble relaxing More than half the days More than half the days Nearly every day Nearly every day More than half the days More than half the days More than half the days   Being so restless that it is hard to sit still Several days Not at all Not at all Several days Not at all Several days Several days   Becoming easily annoyed or irritable Nearly every day More than half the days More than half the days More than half the days More than half the days Nearly every day More than half the days   Feeling afraid as if something awful might happen More than half the days More than half the days Several days More than half the days More than half the days More than half the days Several days   HARI-7 Total Score 17 15 15 17 15 14 13   How difficult have these problems made it for you to do your work, take care of things at home, or get along with other people?  Somewhat difficult Somewhat difficult Somewhat difficult Somewhat difficult Somewhat difficult Somewhat difficult Somewhat difficult   Feeling nervous, anxious, or on edge - - - - - - -   Not able to stop or control worrying - - - - - - -   Worrying too much about different things - - - - - - -   Trouble relaxing - - - - - - -   Being so restless that it's hard to sit still - - - - - - -   Becoming easily annoyed or irritable - - - - - - -   Feeling afraid as if something awful might happen - - - - - - -   HARI-7 Total Score - - - - - - -     HARI 7 SCORE 3/16/2023 3/2/2023 2/23/2023 2/2/2023 1/3/2023 12/13/2022 11/15/2022   HARI-7 Total Score 17 15 15 17 15 14 13   HARI-7 Total Score - - - - - - -       Interpretation of Total Score. Anxiety Severity: Score 0-4: Minimal Anxiety. Score 5-9: Mild Anxiety. Score 10-14: Moderate Anxiety. Score greater than 15: Severe Anxiety.    Diagnosis:    1. Moderate episode of recurrent major depressive disorder (HCC)    2. Anxiety        Past Medical History      Diagnosis Date    Allergic rhinitis     Anxiety     Arthritis     Cancer (HCC)     Melanoma on left side    Hyperlipidemia     Lumbosacral radiculopathy at L5     Melanoma in situ (HCC)     left flank    Prostate calculus     Urology group- Dr. Velez/Jesus - had mri 1/19 and \"scope\" in 9/18    Shingles 2015    left trunk    Trigeminal neuralgia of right side of face        Plan:  Pt interventions:  Discussed and set plan for behavioral activation, Trained in improving communication skills, Discussed self-care (sleep, nutrition, rewarding activities, social support, exercise), Supportive techniques, and Emphasized self-care as important for managing overall health    Pt Behavioral Change Plan:   See Pt Instructions.

## 2023-03-16 NOTE — PATIENT INSTRUCTIONS
Return to see Dr. Emilia Godinez in 2-3 weeks  Think about next steps in your career  Write down/make lists of important topics to communicate to sisters   Engage in stress management strategies throughout week    STRESS MANAGEMENT:  IDENTIFYING SELF TALK      General Questions    What are my thoughts here? What have I been or am I saying to myself? How am I thinking about the situation ? Questions to Identify Expectations    What am I thinking or expecting of myself here? What am I thinking or expecting of others here? How am I thinking or expecting the world to treat me? What Common Unhealthy Beliefs could be stimulating my reaction? What significance am I telling myself this situation could have? What do I think might be at stake here? Questions to Identify Predictions    What am I thinking is going to or might happen because of this situation? Am I thinking any What ifs from this situation?   What am I telling myself this situation might mean for my future? What am I predicting others might do because of this situation? What am I predicting I might do because of this situation? Questions to Identify Evaluations    How wonderful - awful am I rating this event? How wonderful - awful am I rating myself because of this situation? How wonderful - awful am I rating others because of this situation? If my predictions occur, how wonderful - awful do I think that will be? If my predictions dont occur, how wonderful - awful do I think that will be?

## 2023-03-24 ENCOUNTER — HOSPITAL ENCOUNTER (OUTPATIENT)
Dept: MRI IMAGING | Age: 53
Discharge: HOME | End: 2023-03-24
Payer: COMMERCIAL

## 2023-03-24 DIAGNOSIS — R20.0 NUMBNESS: ICD-10-CM

## 2023-03-24 PROCEDURE — 70553 MRI BRAIN STEM W/O & W/DYE: CPT

## 2023-03-24 PROCEDURE — A9579 GAD-BASE MR CONTRAST NOS,1ML: HCPCS | Performed by: INTERNAL MEDICINE

## 2023-03-24 PROCEDURE — 6360000004 HC RX CONTRAST MEDICATION: Performed by: INTERNAL MEDICINE

## 2023-03-24 RX ADMIN — GADOTERIDOL 13 ML: 279.3 INJECTION, SOLUTION INTRAVENOUS at 08:56

## 2023-03-26 ENCOUNTER — TELEPHONE (OUTPATIENT)
Dept: INTERNAL MEDICINE CLINIC | Age: 53
End: 2023-03-26

## 2023-03-29 ENCOUNTER — SCHEDULED TELEPHONE ENCOUNTER (OUTPATIENT)
Dept: INTERNAL MEDICINE CLINIC | Age: 53
End: 2023-03-29
Payer: COMMERCIAL

## 2023-03-29 DIAGNOSIS — R20.0 NUMBNESS AND TINGLING: Primary | ICD-10-CM

## 2023-03-29 DIAGNOSIS — R20.2 NUMBNESS AND TINGLING: Primary | ICD-10-CM

## 2023-03-29 PROCEDURE — 99442 PR PHYS/QHP TELEPHONE EVALUATION 11-20 MIN: CPT | Performed by: INTERNAL MEDICINE

## 2023-03-29 NOTE — PROGRESS NOTES
Steffi Richmond is a 47 y.o. male evaluated via telephone on 3/29/2023 for Results (Discuss MRI)  . Documentation:  I communicated with the patient and/or health care decision maker about his brain MRI results:  3/24/23  Minimal nonspecific white matter disease. This may represent sequelae of migraines, gliosis from prior insult, or chronic small vessel ischemic change. Otherwise normal MRI of the brain. Details of this discussion including any medical advice provided:   Still w numbness of right cheek and left foot and also electric sensation of scalp, diffuse muscle twitching. I referred him to Neurology to further evaluate. Total Time: minutes: 11-20 minutes    Steffi Richmond was evaluated through a synchronous (real-time) audio encounter. Patient identification was verified at the start of the visit. He (or guardian if applicable) is aware that this is a billable service, which includes applicable co-pays. This visit was conducted with the patient's (and/or legal guardian's) verbal consent. He has not had a related appointment within my department in the past 7 days or scheduled within the next 24 hours. The patient was located at Home: [address] [place] [address]. The provider was located at [facility] (Appt Dept): [address] [facility],  [address] [place] [address].     Note: not billable if this call serves to triage the patient into an appointment for the relevant concern    Shandra Whitaker MD

## 2023-04-06 ENCOUNTER — OFFICE VISIT (OUTPATIENT)
Dept: PSYCHOLOGY | Age: 53
End: 2023-04-06
Payer: COMMERCIAL

## 2023-04-06 DIAGNOSIS — F33.1 MODERATE EPISODE OF RECURRENT MAJOR DEPRESSIVE DISORDER (HCC): Primary | ICD-10-CM

## 2023-04-06 DIAGNOSIS — F41.9 ANXIETY: ICD-10-CM

## 2023-04-06 PROCEDURE — 1036F TOBACCO NON-USER: CPT

## 2023-04-06 PROCEDURE — 90832 PSYTX W PT 30 MINUTES: CPT

## 2023-04-06 ASSESSMENT — PATIENT HEALTH QUESTIONNAIRE - PHQ9
SUM OF ALL RESPONSES TO PHQ9 QUESTIONS 1 & 2: 6
8. MOVING OR SPEAKING SO SLOWLY THAT OTHER PEOPLE COULD HAVE NOTICED. OR THE OPPOSITE, BEING SO FIGETY OR RESTLESS THAT YOU HAVE BEEN MOVING AROUND A LOT MORE THAN USUAL: 0
4. FEELING TIRED OR HAVING LITTLE ENERGY: 3
7. TROUBLE CONCENTRATING ON THINGS, SUCH AS READING THE NEWSPAPER OR WATCHING TELEVISION: 1
SUM OF ALL RESPONSES TO PHQ QUESTIONS 1-9: 15
2. FEELING DOWN, DEPRESSED OR HOPELESS: 3
9. THOUGHTS THAT YOU WOULD BE BETTER OFF DEAD, OR OF HURTING YOURSELF: 0
1. LITTLE INTEREST OR PLEASURE IN DOING THINGS: 3
3. TROUBLE FALLING OR STAYING ASLEEP: 3
SUM OF ALL RESPONSES TO PHQ QUESTIONS 1-9: 15
6. FEELING BAD ABOUT YOURSELF - OR THAT YOU ARE A FAILURE OR HAVE LET YOURSELF OR YOUR FAMILY DOWN: 2
SUM OF ALL RESPONSES TO PHQ QUESTIONS 1-9: 15
10. IF YOU CHECKED OFF ANY PROBLEMS, HOW DIFFICULT HAVE THESE PROBLEMS MADE IT FOR YOU TO DO YOUR WORK, TAKE CARE OF THINGS AT HOME, OR GET ALONG WITH OTHER PEOPLE: 1
5. POOR APPETITE OR OVEREATING: 0
SUM OF ALL RESPONSES TO PHQ QUESTIONS 1-9: 15

## 2023-04-06 ASSESSMENT — ANXIETY QUESTIONNAIRES
GAD7 TOTAL SCORE: 16
1. FEELING NERVOUS, ANXIOUS, OR ON EDGE: 3
2. NOT BEING ABLE TO STOP OR CONTROL WORRYING: 2
3. WORRYING TOO MUCH ABOUT DIFFERENT THINGS: 3
IF YOU CHECKED OFF ANY PROBLEMS ON THIS QUESTIONNAIRE, HOW DIFFICULT HAVE THESE PROBLEMS MADE IT FOR YOU TO DO YOUR WORK, TAKE CARE OF THINGS AT HOME, OR GET ALONG WITH OTHER PEOPLE: SOMEWHAT DIFFICULT
6. BECOMING EASILY ANNOYED OR IRRITABLE: 3
4. TROUBLE RELAXING: 3
5. BEING SO RESTLESS THAT IT IS HARD TO SIT STILL: 0
7. FEELING AFRAID AS IF SOMETHING AWFUL MIGHT HAPPEN: 2

## 2023-04-06 NOTE — PATIENT INSTRUCTIONS
Return to see Dr. Yaya Vance in 2 weeks  Continue engagement in stress management strategies  Consider communicating how you feel to friend at work

## 2023-04-06 NOTE — PROGRESS NOTES
something awful might happen - - - - - - -   HARI-7 Total Score - - - - - - -     HARI 7 SCORE 4/6/2023 3/16/2023 3/2/2023 2/23/2023 2/2/2023 1/3/2023 12/13/2022   HARI-7 Total Score 16 17 15 15 17 15 14   HARI-7 Total Score - - - - - - -       Interpretation of Total Score. Anxiety Severity: Score 0-4: Minimal Anxiety. Score 5-9: Mild Anxiety. Score 10-14: Moderate Anxiety. Score greater than 15: Severe Anxiety. Diagnosis:    1. Moderate episode of recurrent major depressive disorder ([facility] [address]    2. Anxiety        Past Medical History      Diagnosis Date    Allergic rhinitis     Anxiety     Arthritis     Cancer ([facility] [address]     Melanoma on left side    Hyperlipidemia     Lumbosacral radiculopathy at L5     Melanoma in situ [facility])     left flank    Prostate calculus     Urology group- Dr. Jade James - had mri 1/19 and \"scope\" in 9/18    Shingles 2015    left trunk    Trigeminal neuralgia of right side of face        Plan:  Pt interventions:  Trained in relaxation strategies, Trained in improving communication skills, Discussed self-care (sleep, nutrition, rewarding activities, social support, exercise), Supportive techniques, and Emphasized self-care as important for managing overall health    Pt Behavioral Change Plan:   See Pt Instructions.

## 2023-05-08 ENCOUNTER — TELEPHONE (OUTPATIENT)
Dept: INTERNAL MEDICINE CLINIC | Age: 53
End: 2023-05-08

## 2023-06-22 ENCOUNTER — OFFICE VISIT (OUTPATIENT)
Dept: PSYCHOLOGY | Age: 53
End: 2023-06-22
Payer: COMMERCIAL

## 2023-06-22 DIAGNOSIS — F41.9 ANXIETY: ICD-10-CM

## 2023-06-22 DIAGNOSIS — F33.1 MODERATE EPISODE OF RECURRENT MAJOR DEPRESSIVE DISORDER (HCC): Primary | ICD-10-CM

## 2023-06-22 PROCEDURE — 1036F TOBACCO NON-USER: CPT

## 2023-06-22 PROCEDURE — 90832 PSYTX W PT 30 MINUTES: CPT

## 2023-06-22 ASSESSMENT — PATIENT HEALTH QUESTIONNAIRE - PHQ9
10. IF YOU CHECKED OFF ANY PROBLEMS, HOW DIFFICULT HAVE THESE PROBLEMS MADE IT FOR YOU TO DO YOUR WORK, TAKE CARE OF THINGS AT HOME, OR GET ALONG WITH OTHER PEOPLE: 1
1. LITTLE INTEREST OR PLEASURE IN DOING THINGS: 3
SUM OF ALL RESPONSES TO PHQ QUESTIONS 1-9: 14
SUM OF ALL RESPONSES TO PHQ QUESTIONS 1-9: 14
8. MOVING OR SPEAKING SO SLOWLY THAT OTHER PEOPLE COULD HAVE NOTICED. OR THE OPPOSITE, BEING SO FIGETY OR RESTLESS THAT YOU HAVE BEEN MOVING AROUND A LOT MORE THAN USUAL: 0
6. FEELING BAD ABOUT YOURSELF - OR THAT YOU ARE A FAILURE OR HAVE LET YOURSELF OR YOUR FAMILY DOWN: 3
SUM OF ALL RESPONSES TO PHQ QUESTIONS 1-9: 14
7. TROUBLE CONCENTRATING ON THINGS, SUCH AS READING THE NEWSPAPER OR WATCHING TELEVISION: 1
5. POOR APPETITE OR OVEREATING: 0
2. FEELING DOWN, DEPRESSED OR HOPELESS: 2
9. THOUGHTS THAT YOU WOULD BE BETTER OFF DEAD, OR OF HURTING YOURSELF: 0
4. FEELING TIRED OR HAVING LITTLE ENERGY: 3
SUM OF ALL RESPONSES TO PHQ9 QUESTIONS 1 & 2: 5
3. TROUBLE FALLING OR STAYING ASLEEP: 2
SUM OF ALL RESPONSES TO PHQ QUESTIONS 1-9: 14

## 2023-06-22 ASSESSMENT — ANXIETY QUESTIONNAIRES
4. TROUBLE RELAXING: 2
1. FEELING NERVOUS, ANXIOUS, OR ON EDGE: 3
GAD7 TOTAL SCORE: 16
6. BECOMING EASILY ANNOYED OR IRRITABLE: 2
3. WORRYING TOO MUCH ABOUT DIFFERENT THINGS: 3
2. NOT BEING ABLE TO STOP OR CONTROL WORRYING: 3
5. BEING SO RESTLESS THAT IT IS HARD TO SIT STILL: 1
IF YOU CHECKED OFF ANY PROBLEMS ON THIS QUESTIONNAIRE, HOW DIFFICULT HAVE THESE PROBLEMS MADE IT FOR YOU TO DO YOUR WORK, TAKE CARE OF THINGS AT HOME, OR GET ALONG WITH OTHER PEOPLE: SOMEWHAT DIFFICULT
7. FEELING AFRAID AS IF SOMETHING AWFUL MIGHT HAPPEN: 2

## 2023-06-22 NOTE — PROGRESS NOTES
Behavioral Health Consultation  HERNANDEZ MCKEON Psy.D. Psychologist  2023  8:35 AM EDT      Time spent with Patient: 25 minutes  This is patient's eighth  [facility] appointment. Reason for Consult:    Chief Complaint   Patient presents with    Depression    Anxiety     Referring Provider: Matthew Aiken MD  [id number] [phone]    Feedback given to PCP. S:  Pt reported his father passed away early May. Had  services that week. Reported he had a difficult time coping with the grief at first but feels he is doing better. Has been seeing mother daily. Completing legal documents. Described it as a \"different type of stress. \"     Pt reported he is still unhappy with work. Stated he talked to his friend/boss about some of his feelings, but pt still does not know next steps. Pt went to [employer] the other day. Reviewed bx activation. O:  MSE:    Appearance: good hygiene   Attitude: cooperative and friendly  Consciousness: alert  Orientation: oriented to person, place, time, general circumstance  Memory: recent and remote memory intact  Attention/Concentration: intact during session  Psychomotor Activity:normal  Eye Contact: normal  Speech: normal rate and volume, well-articulated  Mood: \"stressed in a different way\"  Affect:  mildly dysphoric  Perception: within normal limits  Thought Content: within normal limits  Thought Process: logical, coherent and goal-directed  Insight: good  Judgment: intact  Ability to understand instructions: Yes  Ability to respond meaningfully: Yes  Morbid Ideation: no   Suicide Assessment: no suicidal ideation, plan, or intent  Homicidal Ideation: no    History:    Medications:   Current Outpatient Medications   Medication Sig Dispense Refill    busPIRone (BUSPAR) 10 MG tablet Take 1 tablet by mouth 2 times daily 60 tablet 2     No current facility-administered medications for this visit.      Social History:   Social History

## 2023-06-22 NOTE — PATIENT INSTRUCTIONS
Return to see Dr. Ravindra Dumont in 2-4 weeks  Review handouts on grief  Incorporate more pleasurable activities outside of work and family responsibilities  Call PC office if mood significantly worsens    Bereavement, Grief & Mourning        Bereavement is the state of having lost a significant other to death. Grief is the personal response to the loss. Mourning is the public expression of that loss. What is Normal Grief? Grief reactions vary depending on who we are, who we lost, our relationship with that person, the circumstances around their passing, and how much their loss affects our day-to-day functioning. Different people may express grief differently and you may even have different grief responses between one loss and another. Reactions to grief and loss include not just emotional symptoms, but also behavioral and physical symptoms. These reactions can often change over time. All are normal for a short period of time. Emotional Behavioral Physical   Shock, denial,                   numbness Crying unexpectedly Exhaustion/Fatigue      Sadness, anxiety, guilt,       fear Sleep changes (increase or decrease) Decreased energy        Anger (at others or        God), Not eating/Weight changes Memory problems        Irritability, frustration Withdrawing from others Stomach and intestinal upset    Restlessness, difficulty concentrating, trouble making decisions Pain and headaches     Symptoms that are not normal and may signal the need to talk to a professional include:  Use of drugs, alcohol, violence, and thoughts of killing oneself. The duration of grief varies from person to person. Current research shows that the average recovery time is 18 to 24 months. Also, grief reactions can be stronger around anniversary or other significant dates, such as the anniversary of the persons death, birthdays, and holidays.         The Stages of Grief  Grief therapists often describe stages of grief

## 2023-07-10 ENCOUNTER — OFFICE VISIT (OUTPATIENT)
Dept: NEUROLOGY | Age: 53
End: 2023-07-10
Payer: COMMERCIAL

## 2023-07-10 VITALS
BODY MASS INDEX: 23.74 KG/M2 | HEART RATE: 90 BPM | DIASTOLIC BLOOD PRESSURE: 84 MMHG | SYSTOLIC BLOOD PRESSURE: 158 MMHG | WEIGHT: 134 LBS | HEIGHT: 63 IN

## 2023-07-10 DIAGNOSIS — R20.2 NUMBNESS AND TINGLING: ICD-10-CM

## 2023-07-10 DIAGNOSIS — R20.0 NUMBNESS AND TINGLING: ICD-10-CM

## 2023-07-10 DIAGNOSIS — R20.0 NUMBNESS AND TINGLING: Primary | ICD-10-CM

## 2023-07-10 DIAGNOSIS — R20.2 NUMBNESS AND TINGLING: Primary | ICD-10-CM

## 2023-07-10 LAB — CRP SERPL-MCNC: <3 MG/L (ref 0–5.1)

## 2023-07-10 PROCEDURE — G8427 DOCREV CUR MEDS BY ELIG CLIN: HCPCS | Performed by: PSYCHIATRY & NEUROLOGY

## 2023-07-10 PROCEDURE — G8420 CALC BMI NORM PARAMETERS: HCPCS | Performed by: PSYCHIATRY & NEUROLOGY

## 2023-07-10 PROCEDURE — 3017F COLORECTAL CA SCREEN DOC REV: CPT | Performed by: PSYCHIATRY & NEUROLOGY

## 2023-07-10 PROCEDURE — 99203 OFFICE O/P NEW LOW 30 MIN: CPT | Performed by: PSYCHIATRY & NEUROLOGY

## 2023-07-10 PROCEDURE — 1036F TOBACCO NON-USER: CPT | Performed by: PSYCHIATRY & NEUROLOGY

## 2023-07-10 NOTE — PROGRESS NOTES
The patient is a 47y.o. years old male who  was referred by Pasha Hernandez for consultation regarding new onet numbness    HPI:  The patient describes new onset paresthesia that started 3 to 4 months ago. Description intermittent episode of short lasting electric feeling, burning sensation and tingling can be in the right face, toes, head and sporadic fashion. Symptoms are waxing and waning. Duration is minutes. Degree is moderate. No triggers or other associated symptoms. Could be few times a month. Her MRI of the brain back in March which showed nonspecific small vessel disease. He came today to discuss test results. No family history of MS. He denies a history of visual loss, optic neuritis, bladder or bowel issues or weakness or balance issues. He denies use of drugs. He drinks socially. He denies any balance issues or sleep insomnia. No weakness in the arms or legs or severe neck or back pain. No other relieving or aggravating factors. Other review of system was unremarkable. ROS : A 10-14 system review of constitutional, cardiovascular, respiratory, GI, eyes, , ENT, musculoskeletal, endocrine, skin, SHEENT, genitourinary, psychiatric and neurologic systems was obtained and updated today and is unremarkable except as mentioned in my HPI        Exam:   Constitutional:   Vitals:    07/10/23 1229   BP: (!) 158/84   Site: Right Wrist   Position: Sitting   Pulse: 90   Weight: 134 lb (60.8 kg)   Height: 5' 3\" (1.6 m)       General appearance:  Normal development and appear in no acute distress. Mental Status:   Oriented to person, place, problem, and time. Memory: Good immediate recall. Intact remote memory  Normal attention span and concentration. Language: intact naming, repeating and fluency   Good fund of Knowledge. Aware of current events and vocabulary   Cranial Nerves:   II: Visual fields: Full.  Pupils: equal, round, reactive to light  III,IV,VI: Extra Ocular

## 2023-07-11 LAB
ANA SER QL IA: NEGATIVE
FOLATE SERPL-MCNC: 16.57 NG/ML (ref 4.78–24.2)
VIT B12 SERPL-MCNC: 360 PG/ML (ref 211–911)

## 2023-07-16 DIAGNOSIS — F41.1 GENERALIZED ANXIETY DISORDER WITH PANIC ATTACKS: ICD-10-CM

## 2023-07-16 DIAGNOSIS — F41.0 GENERALIZED ANXIETY DISORDER WITH PANIC ATTACKS: ICD-10-CM

## 2023-07-17 RX ORDER — BUSPIRONE HYDROCHLORIDE 10 MG/1
TABLET ORAL
Qty: 60 TABLET | Refills: 2 | Status: SHIPPED | OUTPATIENT
Start: 2023-07-17

## 2023-07-20 ENCOUNTER — OFFICE VISIT (OUTPATIENT)
Dept: PSYCHOLOGY | Age: 53
End: 2023-07-20
Payer: COMMERCIAL

## 2023-07-20 DIAGNOSIS — F33.1 MODERATE EPISODE OF RECURRENT MAJOR DEPRESSIVE DISORDER (HCC): Primary | ICD-10-CM

## 2023-07-20 DIAGNOSIS — F41.9 ANXIETY: ICD-10-CM

## 2023-07-20 PROCEDURE — 90832 PSYTX W PT 30 MINUTES: CPT

## 2023-07-20 PROCEDURE — 1036F TOBACCO NON-USER: CPT

## 2023-07-20 ASSESSMENT — PATIENT HEALTH QUESTIONNAIRE - PHQ9
SUM OF ALL RESPONSES TO PHQ QUESTIONS 1-9: 15
9. THOUGHTS THAT YOU WOULD BE BETTER OFF DEAD, OR OF HURTING YOURSELF: 0
4. FEELING TIRED OR HAVING LITTLE ENERGY: 3
10. IF YOU CHECKED OFF ANY PROBLEMS, HOW DIFFICULT HAVE THESE PROBLEMS MADE IT FOR YOU TO DO YOUR WORK, TAKE CARE OF THINGS AT HOME, OR GET ALONG WITH OTHER PEOPLE: 1
3. TROUBLE FALLING OR STAYING ASLEEP: 3
7. TROUBLE CONCENTRATING ON THINGS, SUCH AS READING THE NEWSPAPER OR WATCHING TELEVISION: 0
8. MOVING OR SPEAKING SO SLOWLY THAT OTHER PEOPLE COULD HAVE NOTICED. OR THE OPPOSITE, BEING SO FIGETY OR RESTLESS THAT YOU HAVE BEEN MOVING AROUND A LOT MORE THAN USUAL: 0
SUM OF ALL RESPONSES TO PHQ9 QUESTIONS 1 & 2: 6
5. POOR APPETITE OR OVEREATING: 0
6. FEELING BAD ABOUT YOURSELF - OR THAT YOU ARE A FAILURE OR HAVE LET YOURSELF OR YOUR FAMILY DOWN: 3
SUM OF ALL RESPONSES TO PHQ QUESTIONS 1-9: 15
2. FEELING DOWN, DEPRESSED OR HOPELESS: 3
SUM OF ALL RESPONSES TO PHQ QUESTIONS 1-9: 15
SUM OF ALL RESPONSES TO PHQ QUESTIONS 1-9: 15
1. LITTLE INTEREST OR PLEASURE IN DOING THINGS: 3

## 2023-07-20 ASSESSMENT — ANXIETY QUESTIONNAIRES
4. TROUBLE RELAXING: 2
7. FEELING AFRAID AS IF SOMETHING AWFUL MIGHT HAPPEN: 1
2. NOT BEING ABLE TO STOP OR CONTROL WORRYING: 3
GAD7 TOTAL SCORE: 16
3. WORRYING TOO MUCH ABOUT DIFFERENT THINGS: 3
IF YOU CHECKED OFF ANY PROBLEMS ON THIS QUESTIONNAIRE, HOW DIFFICULT HAVE THESE PROBLEMS MADE IT FOR YOU TO DO YOUR WORK, TAKE CARE OF THINGS AT HOME, OR GET ALONG WITH OTHER PEOPLE: SOMEWHAT DIFFICULT
6. BECOMING EASILY ANNOYED OR IRRITABLE: 3
5. BEING SO RESTLESS THAT IT IS HARD TO SIT STILL: 1
1. FEELING NERVOUS, ANXIOUS, OR ON EDGE: 3

## 2023-07-20 NOTE — PROGRESS NOTES
Anxiety. Score 5-9: Mild Anxiety. Score 10-14: Moderate Anxiety. Score greater than 15: Severe Anxiety. Diagnosis:    1. Moderate episode of recurrent major depressive disorder ([address])    2. Anxiety        Past Medical History      Diagnosis Date    Allergic rhinitis     Anxiety     Arthritis     Cancer ([address])     Melanoma on left side    Hyperlipidemia     Lumbosacral radiculopathy at L5     Melanoma in situ [facility])     left flank    Prostate calculus     Urology group- Dr. Kashmir Elliott - had mri 1/19 and \"scope\" in 9/18    Shingles 2015    left trunk    Trigeminal neuralgia of right side of face        Plan:  Pt interventions:  Trained in strategies for increasing balanced thinking, Discussed and set plan for behavioral activation, Discussed self-care (sleep, nutrition, rewarding activities, social support, exercise), Supportive techniques, and Emphasized self-care as important for managing overall health    Pt Behavioral Change Plan:   See Pt Instructions.

## 2023-07-24 ENCOUNTER — TELEPHONE (OUTPATIENT)
Dept: NEUROLOGY | Age: 53
End: 2023-07-24

## 2023-07-24 NOTE — TELEPHONE ENCOUNTER
Pt phoned wanting to know about additional labs order in his chart . He had some done the day he was here and was not aware of the other ones. Please call pt back.

## 2023-07-27 ENCOUNTER — TELEPHONE (OUTPATIENT)
Dept: NEUROLOGY | Age: 53
End: 2023-07-27

## 2023-07-27 ENCOUNTER — OFFICE VISIT (OUTPATIENT)
Dept: PSYCHIATRY | Age: 53
End: 2023-07-27
Payer: COMMERCIAL

## 2023-07-27 VITALS
WEIGHT: 136.2 LBS | BODY MASS INDEX: 24.13 KG/M2 | HEIGHT: 63 IN | DIASTOLIC BLOOD PRESSURE: 70 MMHG | SYSTOLIC BLOOD PRESSURE: 132 MMHG | HEART RATE: 92 BPM

## 2023-07-27 DIAGNOSIS — F41.1 GENERALIZED ANXIETY DISORDER WITH PANIC ATTACKS: Primary | ICD-10-CM

## 2023-07-27 DIAGNOSIS — F41.0 GENERALIZED ANXIETY DISORDER WITH PANIC ATTACKS: Primary | ICD-10-CM

## 2023-07-27 DIAGNOSIS — R20.0 NUMBNESS AND TINGLING: Primary | ICD-10-CM

## 2023-07-27 DIAGNOSIS — F40.10 SOCIAL ANXIETY DISORDER: ICD-10-CM

## 2023-07-27 DIAGNOSIS — Z63.4 BEREAVEMENT: ICD-10-CM

## 2023-07-27 DIAGNOSIS — R20.2 NUMBNESS AND TINGLING: Primary | ICD-10-CM

## 2023-07-27 DIAGNOSIS — R20.0 NUMBNESS AND TINGLING: ICD-10-CM

## 2023-07-27 DIAGNOSIS — R20.2 NUMBNESS AND TINGLING: ICD-10-CM

## 2023-07-27 PROCEDURE — G8420 CALC BMI NORM PARAMETERS: HCPCS | Performed by: STUDENT IN AN ORGANIZED HEALTH CARE EDUCATION/TRAINING PROGRAM

## 2023-07-27 PROCEDURE — 99214 OFFICE O/P EST MOD 30 MIN: CPT | Performed by: STUDENT IN AN ORGANIZED HEALTH CARE EDUCATION/TRAINING PROGRAM

## 2023-07-27 PROCEDURE — G8427 DOCREV CUR MEDS BY ELIG CLIN: HCPCS | Performed by: STUDENT IN AN ORGANIZED HEALTH CARE EDUCATION/TRAINING PROGRAM

## 2023-07-27 PROCEDURE — 3017F COLORECTAL CA SCREEN DOC REV: CPT | Performed by: STUDENT IN AN ORGANIZED HEALTH CARE EDUCATION/TRAINING PROGRAM

## 2023-07-27 PROCEDURE — 1036F TOBACCO NON-USER: CPT | Performed by: STUDENT IN AN ORGANIZED HEALTH CARE EDUCATION/TRAINING PROGRAM

## 2023-07-27 RX ORDER — ESCITALOPRAM OXALATE 5 MG/1
5 TABLET ORAL DAILY
Qty: 30 TABLET | Refills: 2 | Status: SHIPPED | OUTPATIENT
Start: 2023-07-27

## 2023-07-27 SDOH — SOCIAL STABILITY - SOCIAL INSECURITY: DISSAPEARANCE AND DEATH OF FAMILY MEMBER: Z63.4

## 2023-07-27 ASSESSMENT — ANXIETY QUESTIONNAIRES
6. BECOMING EASILY ANNOYED OR IRRITABLE: 3
IF YOU CHECKED OFF ANY PROBLEMS ON THIS QUESTIONNAIRE, HOW DIFFICULT HAVE THESE PROBLEMS MADE IT FOR YOU TO DO YOUR WORK, TAKE CARE OF THINGS AT HOME, OR GET ALONG WITH OTHER PEOPLE: SOMEWHAT DIFFICULT
2. NOT BEING ABLE TO STOP OR CONTROL WORRYING: 2
1. FEELING NERVOUS, ANXIOUS, OR ON EDGE: 3
5. BEING SO RESTLESS THAT IT IS HARD TO SIT STILL: 0
3. WORRYING TOO MUCH ABOUT DIFFERENT THINGS: 2
7. FEELING AFRAID AS IF SOMETHING AWFUL MIGHT HAPPEN: 1
GAD7 TOTAL SCORE: 12
4. TROUBLE RELAXING: 1

## 2023-07-27 ASSESSMENT — PATIENT HEALTH QUESTIONNAIRE - PHQ9
SUM OF ALL RESPONSES TO PHQ QUESTIONS 1-9: 16
8. MOVING OR SPEAKING SO SLOWLY THAT OTHER PEOPLE COULD HAVE NOTICED. OR THE OPPOSITE, BEING SO FIGETY OR RESTLESS THAT YOU HAVE BEEN MOVING AROUND A LOT MORE THAN USUAL: 0
7. TROUBLE CONCENTRATING ON THINGS, SUCH AS READING THE NEWSPAPER OR WATCHING TELEVISION: 1
1. LITTLE INTEREST OR PLEASURE IN DOING THINGS: 3
6. FEELING BAD ABOUT YOURSELF - OR THAT YOU ARE A FAILURE OR HAVE LET YOURSELF OR YOUR FAMILY DOWN: 3
SUM OF ALL RESPONSES TO PHQ QUESTIONS 1-9: 16
2. FEELING DOWN, DEPRESSED OR HOPELESS: 3
10. IF YOU CHECKED OFF ANY PROBLEMS, HOW DIFFICULT HAVE THESE PROBLEMS MADE IT FOR YOU TO DO YOUR WORK, TAKE CARE OF THINGS AT HOME, OR GET ALONG WITH OTHER PEOPLE: 1
3. TROUBLE FALLING OR STAYING ASLEEP: 3
4. FEELING TIRED OR HAVING LITTLE ENERGY: 3
SUM OF ALL RESPONSES TO PHQ9 QUESTIONS 1 & 2: 6
SUM OF ALL RESPONSES TO PHQ QUESTIONS 1-9: 16
9. THOUGHTS THAT YOU WOULD BE BETTER OFF DEAD, OR OF HURTING YOURSELF: 0
SUM OF ALL RESPONSES TO PHQ QUESTIONS 1-9: 16
5. POOR APPETITE OR OVEREATING: 0

## 2023-07-27 ASSESSMENT — ENCOUNTER SYMPTOMS
RESPIRATORY NEGATIVE: 1
GASTROINTESTINAL NEGATIVE: 1
EYES NEGATIVE: 1
ALLERGIC/IMMUNOLOGIC NEGATIVE: 1

## 2023-07-27 NOTE — TELEPHONE ENCOUNTER
Hortensia Huitron from [facility] called today regarding the lab orders for pt.     Hortensia Huitron reports that the order for the Immunofixation Electrophoresis and the Electrophoresis Protein the lab can't do anything with those orders and ask for them to be re-ordered    Hortensia Huitron also reports that the order for the Sedimentation Rate has the wrong lab number on it and it needs to be re ordered with this number on it -- lab [facility] [place] is there till 4:30

## 2023-07-27 NOTE — PATIENT INSTRUCTIONS
Starting around 8/10 consider moving some of the AM buspirone to the evenings to help with daytime sedation if that is still occurring. This would result in either a 5mg every morning/15mg every night or even 20mg every night regimen.

## 2023-07-27 NOTE — PROGRESS NOTES
Patient is here following up in person today and states that he is not sure if his medication is working. (Buspar).      PHQ:16  HARI:12    Previous Scores from 4.10.23  PHQ: 15   HARI: 17
Vitals:    07/27/23 0858   BP: 132/70   Site: Right Upper Arm   Position: Sitting   Cuff Size: Medium Adult   Pulse: 92   Weight: 136 lb 3.2 oz (61.8 kg)   Height: 5' 3\" (1.6 m)       Mental Status Exam:   Appearance:    Appropriately dressed  Motor: No abnormal movements, tics or mannerisms. Eye Contact: Fair  Speech:    Monotone  Language:   Appropriate diction  Mood/Affect:  \"Not great\"/ blunting noted, though reactive with appropriate stimuli  Thought Process:   Linear  Thought Content:    Depressive and anxious content present, no SI/HI  Hallucinations:   Denied, not seen to be responding to IS  Associations:   Intact  Attention/Concentration:   Intact  Orientation:    Alert and oriented x4  Memory:   Intact  Fund of Knowledge:    Appropriate for age and education  Insight/Judgement:   Intact/intact      PHQ-9 Questionaire 7/27/2023 7/20/2023   Little interest or pleasure in doing things 3 3   Feeling down, depressed, or hopeless 3 3   Trouble falling or staying asleep, or sleeping too much 3 3   Feeling tired or having little energy 3 3   Poor appetite or overeating 0 0   Feeling bad about yourself - or that you are a failure or have let yourself or your family down 3 3   Trouble concentrating on things, such as reading the newspaper or watching television 1 0   Moving or speaking so slowly that other people could have noticed. Or the opposite - being so fidgety or restless that you have been moving around a lot more than usual 0 0   Thoughts that you would be better off dead, or of hurting yourself in some way 0 0   PHQ-9 Total Score 16 15   If you checked off any problems, how difficult have these problems made it for you to do your work, take care of things at home, or get along with other people?  1 1     HARI-7 SCREENING 7/27/2023 7/20/2023   Feeling nervous, anxious, or on edge Nearly every day Nearly every day   Not being able to stop or control worrying More than half the days Nearly every day

## 2023-07-28 LAB
ALBUMIN SERPL ELPH-MCNC: 3.8 G/DL (ref 3.1–4.9)
ALPHA1 GLOB SERPL ELPH-MCNC: 0.2 G/DL (ref 0.2–0.4)
ALPHA2 GLOB SERPL ELPH-MCNC: 0.7 G/DL (ref 0.4–1.1)
B-GLOBULIN SERPL ELPH-MCNC: 1.1 G/DL (ref 0.9–1.6)
ERYTHROCYTE [SEDIMENTATION RATE] IN BLOOD BY WESTERGREN METHOD: 3 MM/HR (ref 0–20)
EST. AVERAGE GLUCOSE BLD GHB EST-MCNC: 108.3 MG/DL
GAMMA GLOB SERPL ELPH-MCNC: 1.1 G/DL (ref 0.6–1.8)
HBA1C MFR BLD: 5.4 %
PROT SERPL-MCNC: 6.8 G/DL (ref 6.4–8.2)
SPE/IFE INTERPRETATION: NORMAL

## 2023-08-17 ENCOUNTER — OFFICE VISIT (OUTPATIENT)
Dept: PSYCHOLOGY | Age: 53
End: 2023-08-17
Payer: COMMERCIAL

## 2023-08-17 DIAGNOSIS — F33.1 MODERATE EPISODE OF RECURRENT MAJOR DEPRESSIVE DISORDER (HCC): Primary | ICD-10-CM

## 2023-08-17 DIAGNOSIS — F41.9 ANXIETY: ICD-10-CM

## 2023-08-17 PROCEDURE — 90832 PSYTX W PT 30 MINUTES: CPT

## 2023-08-17 PROCEDURE — 1036F TOBACCO NON-USER: CPT

## 2023-08-17 ASSESSMENT — PATIENT HEALTH QUESTIONNAIRE - PHQ9
7. TROUBLE CONCENTRATING ON THINGS, SUCH AS READING THE NEWSPAPER OR WATCHING TELEVISION: 0
1. LITTLE INTEREST OR PLEASURE IN DOING THINGS: 3
SUM OF ALL RESPONSES TO PHQ9 QUESTIONS 1 & 2: 6
SUM OF ALL RESPONSES TO PHQ QUESTIONS 1-9: 14
2. FEELING DOWN, DEPRESSED OR HOPELESS: 3
3. TROUBLE FALLING OR STAYING ASLEEP: 3
9. THOUGHTS THAT YOU WOULD BE BETTER OFF DEAD, OR OF HURTING YOURSELF: 0
SUM OF ALL RESPONSES TO PHQ QUESTIONS 1-9: 14
SUM OF ALL RESPONSES TO PHQ QUESTIONS 1-9: 14
10. IF YOU CHECKED OFF ANY PROBLEMS, HOW DIFFICULT HAVE THESE PROBLEMS MADE IT FOR YOU TO DO YOUR WORK, TAKE CARE OF THINGS AT HOME, OR GET ALONG WITH OTHER PEOPLE: 1
6. FEELING BAD ABOUT YOURSELF - OR THAT YOU ARE A FAILURE OR HAVE LET YOURSELF OR YOUR FAMILY DOWN: 2
4. FEELING TIRED OR HAVING LITTLE ENERGY: 3
SUM OF ALL RESPONSES TO PHQ QUESTIONS 1-9: 14
5. POOR APPETITE OR OVEREATING: 0
8. MOVING OR SPEAKING SO SLOWLY THAT OTHER PEOPLE COULD HAVE NOTICED. OR THE OPPOSITE, BEING SO FIGETY OR RESTLESS THAT YOU HAVE BEEN MOVING AROUND A LOT MORE THAN USUAL: 0

## 2023-08-17 ASSESSMENT — ANXIETY QUESTIONNAIRES
2. NOT BEING ABLE TO STOP OR CONTROL WORRYING: 3
5. BEING SO RESTLESS THAT IT IS HARD TO SIT STILL: 0
4. TROUBLE RELAXING: 2
1. FEELING NERVOUS, ANXIOUS, OR ON EDGE: 3
GAD7 TOTAL SCORE: 16
IF YOU CHECKED OFF ANY PROBLEMS ON THIS QUESTIONNAIRE, HOW DIFFICULT HAVE THESE PROBLEMS MADE IT FOR YOU TO DO YOUR WORK, TAKE CARE OF THINGS AT HOME, OR GET ALONG WITH OTHER PEOPLE: SOMEWHAT DIFFICULT
3. WORRYING TOO MUCH ABOUT DIFFERENT THINGS: 3
7. FEELING AFRAID AS IF SOMETHING AWFUL MIGHT HAPPEN: 2
6. BECOMING EASILY ANNOYED OR IRRITABLE: 3

## 2023-08-17 NOTE — PROGRESS NOTES
reports current alcohol use. Family History:   Family History   Problem Relation Age of Onset    Scoliosis Mother     Hypertension Father         passed 0    Breast Cancer Sister     Diabetes type 2  Paternal Grandfather     Diabetes Paternal Grandfather        A:  Patient engaged and cooperative. Denied suicidal ideation. Insight and motivation are good. Re-administered PHQ-9 and HARI-7 (see below). Patient endorses Moderate symptoms of depression and Severe symptoms of anxiety. PHQ-9 Questionaire 8/17/2023 7/27/2023 7/20/2023 6/22/2023 4/10/2023 4/6/2023 3/16/2023   Little interest or pleasure in doing things 3 3 3 3 3 3 3   Feeling down, depressed, or hopeless 3 3 3 2 3 3 3   Trouble falling or staying asleep, or sleeping too much 3 3 3 2 3 3 3   Feeling tired or having little energy 3 3 3 3 3 3 3   Poor appetite or overeating 0 0 0 0 0 0 0   Feeling bad about yourself - or that you are a failure or have let yourself or your family down 2 3 3 3 2 2 2   Trouble concentrating on things, such as reading the newspaper or watching television 0 1 0 1 1 1 0   Moving or speaking so slowly that other people could have noticed. Or the opposite - being so fidgety or restless that you have been moving around a lot more than usual 0 0 0 0 0 0 0   Thoughts that you would be better off dead, or of hurting yourself in some way 0 0 0 0 0 0 0   PHQ-9 Total Score 14 16 15 14 15 15 14   If you checked off any problems, how difficult have these problems made it for you to do your work, take care of things at home, or get along with other people?  1 1 1 1 1 1 1     PHQ Scores 8/17/2023 7/27/2023 7/20/2023 6/22/2023 4/10/2023 4/6/2023 3/16/2023   PHQ2 Score 6 6 6 5 6 6 6   PHQ9 Score 14 16 15 14 15 15 14       Interpretation of Total Score Depression Severity: 1-4 = Minimal depression, 5-9 = Mild depression, 10-14 = Moderate depression, 15-19 = Moderately severe depression, 20-27 = Severe depression     HARI-7 SCREENING

## 2023-09-07 ENCOUNTER — OFFICE VISIT (OUTPATIENT)
Dept: PSYCHIATRY | Age: 53
End: 2023-09-07
Payer: COMMERCIAL

## 2023-09-07 VITALS
HEART RATE: 76 BPM | BODY MASS INDEX: 24.27 KG/M2 | WEIGHT: 137 LBS | SYSTOLIC BLOOD PRESSURE: 108 MMHG | DIASTOLIC BLOOD PRESSURE: 72 MMHG

## 2023-09-07 DIAGNOSIS — F33.1 MAJOR DEPRESSIVE DISORDER, RECURRENT EPISODE, MODERATE (HCC): ICD-10-CM

## 2023-09-07 DIAGNOSIS — F41.0 GENERALIZED ANXIETY DISORDER WITH PANIC ATTACKS: ICD-10-CM

## 2023-09-07 DIAGNOSIS — F41.1 GENERALIZED ANXIETY DISORDER WITH PANIC ATTACKS: ICD-10-CM

## 2023-09-07 DIAGNOSIS — F40.10 SOCIAL ANXIETY DISORDER: Primary | ICD-10-CM

## 2023-09-07 DIAGNOSIS — Z63.4 BEREAVEMENT: ICD-10-CM

## 2023-09-07 PROCEDURE — 99214 OFFICE O/P EST MOD 30 MIN: CPT | Performed by: STUDENT IN AN ORGANIZED HEALTH CARE EDUCATION/TRAINING PROGRAM

## 2023-09-07 PROCEDURE — 1036F TOBACCO NON-USER: CPT | Performed by: STUDENT IN AN ORGANIZED HEALTH CARE EDUCATION/TRAINING PROGRAM

## 2023-09-07 PROCEDURE — G8420 CALC BMI NORM PARAMETERS: HCPCS | Performed by: STUDENT IN AN ORGANIZED HEALTH CARE EDUCATION/TRAINING PROGRAM

## 2023-09-07 PROCEDURE — G8427 DOCREV CUR MEDS BY ELIG CLIN: HCPCS | Performed by: STUDENT IN AN ORGANIZED HEALTH CARE EDUCATION/TRAINING PROGRAM

## 2023-09-07 PROCEDURE — 3017F COLORECTAL CA SCREEN DOC REV: CPT | Performed by: STUDENT IN AN ORGANIZED HEALTH CARE EDUCATION/TRAINING PROGRAM

## 2023-09-07 RX ORDER — ESCITALOPRAM OXALATE 10 MG/1
10 TABLET ORAL DAILY
Qty: 30 TABLET | Refills: 2 | Status: SHIPPED | OUTPATIENT
Start: 2023-09-07

## 2023-09-07 SDOH — SOCIAL STABILITY - SOCIAL INSECURITY: DISSAPEARANCE AND DEATH OF FAMILY MEMBER: Z63.4

## 2023-09-07 ASSESSMENT — ANXIETY QUESTIONNAIRES
GAD7 TOTAL SCORE: 14
3. WORRYING TOO MUCH ABOUT DIFFERENT THINGS: 2
1. FEELING NERVOUS, ANXIOUS, OR ON EDGE: 3
2. NOT BEING ABLE TO STOP OR CONTROL WORRYING: 3
6. BECOMING EASILY ANNOYED OR IRRITABLE: 3
7. FEELING AFRAID AS IF SOMETHING AWFUL MIGHT HAPPEN: 2
4. TROUBLE RELAXING: 1
5. BEING SO RESTLESS THAT IT IS HARD TO SIT STILL: 0

## 2023-09-07 ASSESSMENT — ENCOUNTER SYMPTOMS
ALLERGIC/IMMUNOLOGIC NEGATIVE: 1
EYES NEGATIVE: 1
RESPIRATORY NEGATIVE: 1
GASTROINTESTINAL NEGATIVE: 1

## 2023-09-07 ASSESSMENT — PATIENT HEALTH QUESTIONNAIRE - PHQ9
1. LITTLE INTEREST OR PLEASURE IN DOING THINGS: 3
7. TROUBLE CONCENTRATING ON THINGS, SUCH AS READING THE NEWSPAPER OR WATCHING TELEVISION: 0
SUM OF ALL RESPONSES TO PHQ QUESTIONS 1-9: 14
SUM OF ALL RESPONSES TO PHQ QUESTIONS 1-9: 14
2. FEELING DOWN, DEPRESSED OR HOPELESS: 3
4. FEELING TIRED OR HAVING LITTLE ENERGY: 3
9. THOUGHTS THAT YOU WOULD BE BETTER OFF DEAD, OR OF HURTING YOURSELF: 0
3. TROUBLE FALLING OR STAYING ASLEEP: 3
SUM OF ALL RESPONSES TO PHQ QUESTIONS 1-9: 14
6. FEELING BAD ABOUT YOURSELF - OR THAT YOU ARE A FAILURE OR HAVE LET YOURSELF OR YOUR FAMILY DOWN: 2
8. MOVING OR SPEAKING SO SLOWLY THAT OTHER PEOPLE COULD HAVE NOTICED. OR THE OPPOSITE, BEING SO FIGETY OR RESTLESS THAT YOU HAVE BEEN MOVING AROUND A LOT MORE THAN USUAL: 0
5. POOR APPETITE OR OVEREATING: 0
SUM OF ALL RESPONSES TO PHQ9 QUESTIONS 1 & 2: 6
SUM OF ALL RESPONSES TO PHQ QUESTIONS 1-9: 14

## 2023-09-07 NOTE — PROGRESS NOTES
Patient is here following up in person today and states that his medication is not really working.     PHQ:14  HARI:14    Previous Scores from 7.27.23  PHQ:16  HARI:12
Intact  Attention/Concentration:   Intact  Orientation:    Alert and oriented x4  Memory:   Intact  Fund of Knowledge:    Appropriate for age and education  Insight/Judgement:   Intact/intact      PHQ-9 Questionaire 9/7/2023 8/17/2023   Little interest or pleasure in doing things 3 3   Feeling down, depressed, or hopeless 3 3   Trouble falling or staying asleep, or sleeping too much 3 3   Feeling tired or having little energy 3 3   Poor appetite or overeating 0 0   Feeling bad about yourself - or that you are a failure or have let yourself or your family down 2 2   Trouble concentrating on things, such as reading the newspaper or watching television 0 0   Moving or speaking so slowly that other people could have noticed. Or the opposite - being so fidgety or restless that you have been moving around a lot more than usual 0 0   Thoughts that you would be better off dead, or of hurting yourself in some way 0 0   PHQ-9 Total Score 14 14   If you checked off any problems, how difficult have these problems made it for you to do your work, take care of things at home, or get along with other people? - 1     HARI-7 SCREENING 9/7/2023 8/17/2023   Feeling nervous, anxious, or on edge Nearly every day Nearly every day   Not being able to stop or control worrying Nearly every day Nearly every day   Worrying too much about different things More than half the days Nearly every day   Trouble relaxing Several days More than half the days   Being so restless that it is hard to sit still Not at all Not at all   Becoming easily annoyed or irritable Nearly every day Nearly every day   Feeling afraid as if something awful might happen More than half the days More than half the days   HARI-7 Total Score 14 16   How difficult have these problems made it for you to do your work, take care of things at home, or get along with other people?  - Somewhat difficult   Feeling nervous, anxious, or on edge - -   Not able to stop or control worrying

## 2023-09-12 ENCOUNTER — OFFICE VISIT (OUTPATIENT)
Dept: PSYCHOLOGY | Age: 53
End: 2023-09-12
Payer: COMMERCIAL

## 2023-09-12 DIAGNOSIS — F41.9 ANXIETY: ICD-10-CM

## 2023-09-12 DIAGNOSIS — F33.1 MODERATE EPISODE OF RECURRENT MAJOR DEPRESSIVE DISORDER (HCC): Primary | ICD-10-CM

## 2023-09-12 PROCEDURE — 1036F TOBACCO NON-USER: CPT

## 2023-09-12 PROCEDURE — 90832 PSYTX W PT 30 MINUTES: CPT

## 2023-09-12 ASSESSMENT — PATIENT HEALTH QUESTIONNAIRE - PHQ9
4. FEELING TIRED OR HAVING LITTLE ENERGY: 3
3. TROUBLE FALLING OR STAYING ASLEEP: 3
2. FEELING DOWN, DEPRESSED OR HOPELESS: 3
SUM OF ALL RESPONSES TO PHQ QUESTIONS 1-9: 14
1. LITTLE INTEREST OR PLEASURE IN DOING THINGS: 3
SUM OF ALL RESPONSES TO PHQ QUESTIONS 1-9: 14
SUM OF ALL RESPONSES TO PHQ QUESTIONS 1-9: 14
8. MOVING OR SPEAKING SO SLOWLY THAT OTHER PEOPLE COULD HAVE NOTICED. OR THE OPPOSITE, BEING SO FIGETY OR RESTLESS THAT YOU HAVE BEEN MOVING AROUND A LOT MORE THAN USUAL: 0
SUM OF ALL RESPONSES TO PHQ QUESTIONS 1-9: 14
9. THOUGHTS THAT YOU WOULD BE BETTER OFF DEAD, OR OF HURTING YOURSELF: 0
SUM OF ALL RESPONSES TO PHQ9 QUESTIONS 1 & 2: 6
6. FEELING BAD ABOUT YOURSELF - OR THAT YOU ARE A FAILURE OR HAVE LET YOURSELF OR YOUR FAMILY DOWN: 2
7. TROUBLE CONCENTRATING ON THINGS, SUCH AS READING THE NEWSPAPER OR WATCHING TELEVISION: 0
10. IF YOU CHECKED OFF ANY PROBLEMS, HOW DIFFICULT HAVE THESE PROBLEMS MADE IT FOR YOU TO DO YOUR WORK, TAKE CARE OF THINGS AT HOME, OR GET ALONG WITH OTHER PEOPLE: 1
5. POOR APPETITE OR OVEREATING: 0

## 2023-09-12 ASSESSMENT — ANXIETY QUESTIONNAIRES
6. BECOMING EASILY ANNOYED OR IRRITABLE: 3
GAD7 TOTAL SCORE: 16
7. FEELING AFRAID AS IF SOMETHING AWFUL MIGHT HAPPEN: 2
IF YOU CHECKED OFF ANY PROBLEMS ON THIS QUESTIONNAIRE, HOW DIFFICULT HAVE THESE PROBLEMS MADE IT FOR YOU TO DO YOUR WORK, TAKE CARE OF THINGS AT HOME, OR GET ALONG WITH OTHER PEOPLE: SOMEWHAT DIFFICULT
1. FEELING NERVOUS, ANXIOUS, OR ON EDGE: 3
2. NOT BEING ABLE TO STOP OR CONTROL WORRYING: 3
4. TROUBLE RELAXING: 2
5. BEING SO RESTLESS THAT IT IS HARD TO SIT STILL: 0
3. WORRYING TOO MUCH ABOUT DIFFERENT THINGS: 3

## 2023-09-12 NOTE — PROGRESS NOTES
Behavioral Health Consultation  HERNANDEZ MCKEON Psy.D. Psychologist  9/12/2023  9:13 AM EDT      Time spent with Patient: 30 minutes  This is patient's  eleventh   [facility] appointment. Reason for Consult:    Chief Complaint   Patient presents with    Depression    Anxiety     Referring Provider: Ugo Baugh MD  [address] [place] [address] [phone]    Feedback given to PCP: not indicated at this time. S:  Since increased dosage of Lexapro, has noticed feeling \"jittery. \" Has not been sleeping well. Sleeping two hours at a time. Tension, difficulties relaxing. Sometimes feels he cannot breathe when he feels stressed. Has inquired about multiple job opportunities without any luck so far. Stated that he needs to get a job within the next month. Described stressors related to mother's care. Feels she is having more episodes of confusion or forgetfulness. Worried that he will not qualify for his current insurance anymore.     O:  MSE:    Appearance: good hygiene   Attitude: cooperative and friendly  Consciousness: alert  Orientation: oriented to person, place, time, general circumstance  Memory: recent and remote memory intact  Attention/Concentration: intact during session  Psychomotor Activity:normal  Eye Contact: normal  Speech: normal rate and volume, well-articulated  Mood: \"worried\" \"stressed\"  Affect:  mildly anxious  Perception: within normal limits  Thought Content:  anxious content present  Thought Process: logical, coherent and goal-directed  Insight: good  Judgment: intact  Ability to understand instructions: Yes  Ability to respond meaningfully: Yes  Morbid Ideation: no   Suicide Assessment: no suicidal ideation, plan, or intent  Homicidal Ideation: no    History:    Medications:   Current Outpatient Medications   Medication Sig Dispense Refill    escitalopram (LEXAPRO) 10 MG tablet Take 1 tablet by mouth daily 30 tablet 2    busPIRone (BUSPAR) 10 MG tablet TAKE 1 TABLET BY MOUTH

## 2023-09-12 NOTE — PATIENT INSTRUCTIONS
Return to see Dr. Ziggy Weeks in 2 weeks  Engage in relaxation strategies daily and before you go to sleep  Continue to schedule worry time around dinnertime   Call PC office if mood significantly worsens      Progressive Muscle Relaxation  Progressive Muscle Relaxation is a relaxation technique used to release stress. It can relax the muscles and lower blood pressure, heart rate, and respiration. Progressive Muscle Relaxation is the tensing and then relaxing each muscle group of the body, one group at a time. Though this technique is simple it may take several sessions before it is 'mastered.'   Some people prefer to listen to an audiotape (or CD or mp3) that guides one through progressive muscle relaxation. The scripts are usually about 20 minutes long. Progressive muscle relaxation may be done sitting or lying down. Tense up a group of muscles - tense hard but don't strain - and hold for about 5-10 seconds. Release the tension from the muscles all at once. Stay relaxed for 10 - 20 seconds. Some people prefer to count, for example:  Tense for count of 5  Release all at once  Rest for count of 10  . ..or  Tense for count of 10  Release all at once  Rest for count of 20  Pay close attention to the feeling of relaxation when you release the contracted muscles. When going through the muscle groups, some people start with the hands, others with the feet. You may do one side of the body (hand, arm, leg, foot) at a time or do both sides at the same time. Listening to a prerecorded script that guides you through the process is helpful.   Sample of Progressive Muscle Relaxation Exercise:   Hands - Clench fists  tense for 5, release, rest for 10      Right forearms and hands - Extend arm, elbow locked, and bend hand back at the wrist  tense for 5, release, rest for 10      Upper right arm - Bend arms at elbows and flex biceps  tense for 5, release, rest for 10     Forehead - wrinkle forehead into frown, tense, release, rest,

## 2023-10-05 ENCOUNTER — OFFICE VISIT (OUTPATIENT)
Dept: PSYCHOLOGY | Age: 53
End: 2023-10-05
Payer: COMMERCIAL

## 2023-10-05 DIAGNOSIS — F33.1 MODERATE EPISODE OF RECURRENT MAJOR DEPRESSIVE DISORDER (HCC): Primary | ICD-10-CM

## 2023-10-05 DIAGNOSIS — F41.9 ANXIETY: ICD-10-CM

## 2023-10-05 PROCEDURE — 1036F TOBACCO NON-USER: CPT

## 2023-10-05 PROCEDURE — 90832 PSYTX W PT 30 MINUTES: CPT

## 2023-10-05 ASSESSMENT — PATIENT HEALTH QUESTIONNAIRE - PHQ9
7. TROUBLE CONCENTRATING ON THINGS, SUCH AS READING THE NEWSPAPER OR WATCHING TELEVISION: 1
6. FEELING BAD ABOUT YOURSELF - OR THAT YOU ARE A FAILURE OR HAVE LET YOURSELF OR YOUR FAMILY DOWN: 2
5. POOR APPETITE OR OVEREATING: 0
SUM OF ALL RESPONSES TO PHQ9 QUESTIONS 1 & 2: 6
3. TROUBLE FALLING OR STAYING ASLEEP: 3
SUM OF ALL RESPONSES TO PHQ QUESTIONS 1-9: 15
8. MOVING OR SPEAKING SO SLOWLY THAT OTHER PEOPLE COULD HAVE NOTICED. OR THE OPPOSITE, BEING SO FIGETY OR RESTLESS THAT YOU HAVE BEEN MOVING AROUND A LOT MORE THAN USUAL: 0
9. THOUGHTS THAT YOU WOULD BE BETTER OFF DEAD, OR OF HURTING YOURSELF: 0
2. FEELING DOWN, DEPRESSED OR HOPELESS: 3
SUM OF ALL RESPONSES TO PHQ QUESTIONS 1-9: 15
4. FEELING TIRED OR HAVING LITTLE ENERGY: 3
1. LITTLE INTEREST OR PLEASURE IN DOING THINGS: 3
10. IF YOU CHECKED OFF ANY PROBLEMS, HOW DIFFICULT HAVE THESE PROBLEMS MADE IT FOR YOU TO DO YOUR WORK, TAKE CARE OF THINGS AT HOME, OR GET ALONG WITH OTHER PEOPLE: 1

## 2023-10-05 ASSESSMENT — ANXIETY QUESTIONNAIRES
4. TROUBLE RELAXING: 3
1. FEELING NERVOUS, ANXIOUS, OR ON EDGE: 3
2. NOT BEING ABLE TO STOP OR CONTROL WORRYING: 3
3. WORRYING TOO MUCH ABOUT DIFFERENT THINGS: 3
IF YOU CHECKED OFF ANY PROBLEMS ON THIS QUESTIONNAIRE, HOW DIFFICULT HAVE THESE PROBLEMS MADE IT FOR YOU TO DO YOUR WORK, TAKE CARE OF THINGS AT HOME, OR GET ALONG WITH OTHER PEOPLE: SOMEWHAT DIFFICULT
6. BECOMING EASILY ANNOYED OR IRRITABLE: 3
5. BEING SO RESTLESS THAT IT IS HARD TO SIT STILL: 0
GAD7 TOTAL SCORE: 17
7. FEELING AFRAID AS IF SOMETHING AWFUL MIGHT HAPPEN: 2

## 2023-10-05 NOTE — PROGRESS NOTES
than half the days More than half the days Several days Several days More than half the days   HARI-7 Total Score 17 16 14 16 12 16 16   How difficult have these problems made it for you to do your work, take care of things at home, or get along with other people? Somewhat difficult Somewhat difficult  Somewhat difficult Somewhat difficult Somewhat difficult Somewhat difficult         10/5/2023     9:00 AM 9/12/2023     9:00 AM 9/7/2023     8:00 AM 8/17/2023     9:00 AM 7/27/2023     9:00 AM 7/20/2023     9:00 AM 6/22/2023     8:00 AM   HARI 7 SCORE   HARI-7 Total Score 17 16 14 16 12 16 16       Interpretation of Total Score. Anxiety Severity: Score 0-4: Minimal Anxiety. Score 5-9: Mild Anxiety. Score 10-14: Moderate Anxiety. Score greater than 15: Severe Anxiety. Diagnosis:    1. Moderate episode of recurrent major depressive disorder ([address])    2. Anxiety        Past Medical History      Diagnosis Date    Allergic rhinitis     Anxiety     Arthritis     Cancer ([address])     Melanoma on left side    Hyperlipidemia     Lumbosacral radiculopathy at L5     Melanoma in situ [facility])     left flank    Prostate calculus     Urology group- Dr. Vanita Fields - had mri 1/19 and \"scope\" in 9/18    Shingles 2015    left trunk    Trigeminal neuralgia of right side of face        Plan:  Pt interventions:  Trained in strategies for increasing balanced thinking, Discussed and set plan for behavioral activation, Trained in relaxation strategies, Discussed self-care (sleep, nutrition, rewarding activities, social support, exercise), Supportive techniques, Emphasized self-care as important for managing overall health, and Provided Psychoeducation re: ACT and values; sleep hygiene; bx activation;    Pt Behavioral Change Plan:   See Pt Instructions.  Provided pt with printed therapy referral list today

## 2023-10-05 NOTE — PATIENT INSTRUCTIONS
white noise (such as the noise of a fan) or with earplugs. Bedrooms may be darkened with black-out shades or sleep masks can be worn. Position clocks out-of-sight since clock-watching can increase worry about the effects of lack of sleep. Be sure your mattress is not too soft or too firm and that your pillow is the right height and firmness. Eating  A light bedtime snack, such a glass of warm milk, cheese, or a bowl of cereal, can promote sleep. You should avoid the following foods at bedtime:  any caffeinated foods (e.g., chocolate), peanuts, beans, most raw fruits and vegetables (since they may cause gas), and high-fat foods such as potato chips or corn chips. Avoid snacks in the middle of the nights since awakening may become associated with hunger. If you have trouble with regurgitation, be especially careful to avoid heavy meals and spices in the evening. Do not go to bed too hungry or too full. It may help to elevate your head with some pillows. Allow Yourself at [facility] an Hour Before Bedtime to Unwind  Find what works for you to wind down, and perhaps give yourself an hour to do so. Regular Sleep Schedule   Keep a regular time each day, 7 days a week, to get out of bed. Keeping a regular waking time helps set your circadian rhythm so that your body learns to sleep at the desired time. Set a Reasonable Bedtime and Arising Time and Stick to Them   Set the alarm clock and get out of bed at the same time each morning, weekdays and weekends, regardless of your bedtime or the amount of sleep you obtained on the previous night. This guideline is designed to regulate your internal biological clock and reset your clock.       Guidelines for Sleep Stimulus Control    Set a Reasonable Bedtime and Arising Time and Stick to Them  Spending excessive time in bed has two unfortunate consequences: (1) you begin to associate your bedroom with arousal and frustration and (2) your sleep actually

## 2023-10-14 DIAGNOSIS — F41.0 GENERALIZED ANXIETY DISORDER WITH PANIC ATTACKS: ICD-10-CM

## 2023-10-14 DIAGNOSIS — F41.1 GENERALIZED ANXIETY DISORDER WITH PANIC ATTACKS: ICD-10-CM

## 2023-10-16 ENCOUNTER — OFFICE VISIT (OUTPATIENT)
Dept: PSYCHIATRY | Age: 53
End: 2023-10-16
Payer: COMMERCIAL

## 2023-10-16 VITALS
HEART RATE: 84 BPM | SYSTOLIC BLOOD PRESSURE: 126 MMHG | DIASTOLIC BLOOD PRESSURE: 78 MMHG | WEIGHT: 136 LBS | HEIGHT: 63 IN | BODY MASS INDEX: 24.1 KG/M2

## 2023-10-16 DIAGNOSIS — F40.10 SOCIAL ANXIETY DISORDER: Primary | ICD-10-CM

## 2023-10-16 DIAGNOSIS — F41.0 GENERALIZED ANXIETY DISORDER WITH PANIC ATTACKS: ICD-10-CM

## 2023-10-16 DIAGNOSIS — F41.1 GENERALIZED ANXIETY DISORDER WITH PANIC ATTACKS: ICD-10-CM

## 2023-10-16 PROCEDURE — G8420 CALC BMI NORM PARAMETERS: HCPCS | Performed by: STUDENT IN AN ORGANIZED HEALTH CARE EDUCATION/TRAINING PROGRAM

## 2023-10-16 PROCEDURE — 3017F COLORECTAL CA SCREEN DOC REV: CPT | Performed by: STUDENT IN AN ORGANIZED HEALTH CARE EDUCATION/TRAINING PROGRAM

## 2023-10-16 PROCEDURE — 1036F TOBACCO NON-USER: CPT | Performed by: STUDENT IN AN ORGANIZED HEALTH CARE EDUCATION/TRAINING PROGRAM

## 2023-10-16 PROCEDURE — G8427 DOCREV CUR MEDS BY ELIG CLIN: HCPCS | Performed by: STUDENT IN AN ORGANIZED HEALTH CARE EDUCATION/TRAINING PROGRAM

## 2023-10-16 PROCEDURE — G8484 FLU IMMUNIZE NO ADMIN: HCPCS | Performed by: STUDENT IN AN ORGANIZED HEALTH CARE EDUCATION/TRAINING PROGRAM

## 2023-10-16 PROCEDURE — 99214 OFFICE O/P EST MOD 30 MIN: CPT | Performed by: STUDENT IN AN ORGANIZED HEALTH CARE EDUCATION/TRAINING PROGRAM

## 2023-10-16 RX ORDER — ESCITALOPRAM OXALATE 10 MG/1
15 TABLET ORAL DAILY
Qty: 45 TABLET | Refills: 2 | Status: SHIPPED | OUTPATIENT
Start: 2023-10-16

## 2023-10-16 RX ORDER — BUSPIRONE HYDROCHLORIDE 10 MG/1
TABLET ORAL
Qty: 60 TABLET | Refills: 2 | OUTPATIENT
Start: 2023-10-16

## 2023-10-16 RX ORDER — BUSPIRONE HYDROCHLORIDE 10 MG/1
10 TABLET ORAL 2 TIMES DAILY
Qty: 60 TABLET | Refills: 2 | Status: SHIPPED | OUTPATIENT
Start: 2023-10-16

## 2023-10-16 ASSESSMENT — PATIENT HEALTH QUESTIONNAIRE - PHQ9
6. FEELING BAD ABOUT YOURSELF - OR THAT YOU ARE A FAILURE OR HAVE LET YOURSELF OR YOUR FAMILY DOWN: 2
SUM OF ALL RESPONSES TO PHQ QUESTIONS 1-9: 13
1. LITTLE INTEREST OR PLEASURE IN DOING THINGS: 2
3. TROUBLE FALLING OR STAYING ASLEEP: 3
8. MOVING OR SPEAKING SO SLOWLY THAT OTHER PEOPLE COULD HAVE NOTICED. OR THE OPPOSITE, BEING SO FIGETY OR RESTLESS THAT YOU HAVE BEEN MOVING AROUND A LOT MORE THAN USUAL: 0
2. FEELING DOWN, DEPRESSED OR HOPELESS: 3
SUM OF ALL RESPONSES TO PHQ9 QUESTIONS 1 & 2: 5
SUM OF ALL RESPONSES TO PHQ QUESTIONS 1-9: 13
9. THOUGHTS THAT YOU WOULD BE BETTER OFF DEAD, OR OF HURTING YOURSELF: 0
10. IF YOU CHECKED OFF ANY PROBLEMS, HOW DIFFICULT HAVE THESE PROBLEMS MADE IT FOR YOU TO DO YOUR WORK, TAKE CARE OF THINGS AT HOME, OR GET ALONG WITH OTHER PEOPLE: 1
7. TROUBLE CONCENTRATING ON THINGS, SUCH AS READING THE NEWSPAPER OR WATCHING TELEVISION: 0
4. FEELING TIRED OR HAVING LITTLE ENERGY: 3
SUM OF ALL RESPONSES TO PHQ QUESTIONS 1-9: 13
5. POOR APPETITE OR OVEREATING: 0
SUM OF ALL RESPONSES TO PHQ QUESTIONS 1-9: 13

## 2023-10-16 ASSESSMENT — ENCOUNTER SYMPTOMS
ALLERGIC/IMMUNOLOGIC NEGATIVE: 1
EYES NEGATIVE: 1
GASTROINTESTINAL NEGATIVE: 1
RESPIRATORY NEGATIVE: 1

## 2023-10-16 ASSESSMENT — ANXIETY QUESTIONNAIRES
4. TROUBLE RELAXING: 2
3. WORRYING TOO MUCH ABOUT DIFFERENT THINGS: 3
GAD7 TOTAL SCORE: 17
6. BECOMING EASILY ANNOYED OR IRRITABLE: 3
1. FEELING NERVOUS, ANXIOUS, OR ON EDGE: 3
IF YOU CHECKED OFF ANY PROBLEMS ON THIS QUESTIONNAIRE, HOW DIFFICULT HAVE THESE PROBLEMS MADE IT FOR YOU TO DO YOUR WORK, TAKE CARE OF THINGS AT HOME, OR GET ALONG WITH OTHER PEOPLE: SOMEWHAT DIFFICULT
7. FEELING AFRAID AS IF SOMETHING AWFUL MIGHT HAPPEN: 3
5. BEING SO RESTLESS THAT IT IS HARD TO SIT STILL: 0
2. NOT BEING ABLE TO STOP OR CONTROL WORRYING: 3

## 2023-10-16 NOTE — PROGRESS NOTES
Patient presents for an OV. Patient states that he's a little bit anxious due to him mom being hospitalized.   He also stated that the pharmacy gave him 5mg of Lexapro instead of 10mg    PHQ:13  HARI:17
Status    Hemoglobin A1C 2023 5.4  See comment % Final    Comment: Comment:  Diagnosis of Diabetes: > or = 6.5%  Increased risk of diabetes (Prediabetes): 5.7-6.4%  Glycemic Control: Nonpregnant Adults: <7.0%                    Pregnant: <6.0%        eAG 2023 108.3  mg/dL Final    Total Protein 2023 6.8  6.4 - 8.2 g/dL Final    Albumin 2023 3.8  3.1 - 4.9 g/dL Final    Alpha-1-Globulin 2023 0.2  0.2 - 0.4 g/dL Final    Alpha-2-Globulin 2023 0.7  0.4 - 1.1 g/dL Final    Beta Globulin 2023 1.1  0.9 - 1.6 g/dL Final    Gamma Globulin 2023 1.1  0.6 - 1.8 g/dL Final    Sed Rate 2023 3  0 - 20 mm/Hr Final    SPE/LEEANNA Interpretation 2023 REVIEWED   Final    Comment: The serum protein electrophoresis is unremarkable. No monoclonal band is seen on serum protein electrophoresis.   CPT: 33718  Electronically signed: Annmarie Deng MD         [id number] QTc 407        Milady Land MD  Psychiatrist

## 2023-10-28 DIAGNOSIS — F41.0 GENERALIZED ANXIETY DISORDER WITH PANIC ATTACKS: ICD-10-CM

## 2023-10-28 DIAGNOSIS — F41.1 GENERALIZED ANXIETY DISORDER WITH PANIC ATTACKS: ICD-10-CM

## 2023-10-30 RX ORDER — ESCITALOPRAM OXALATE 5 MG/1
5 TABLET ORAL DAILY
Qty: 30 TABLET | Refills: 2 | OUTPATIENT
Start: 2023-10-30

## 2023-11-13 ENCOUNTER — OFFICE VISIT (OUTPATIENT)
Dept: INTERNAL MEDICINE CLINIC | Age: 53
End: 2023-11-13
Payer: COMMERCIAL

## 2023-11-13 VITALS
BODY MASS INDEX: 24.59 KG/M2 | HEART RATE: 74 BPM | SYSTOLIC BLOOD PRESSURE: 100 MMHG | DIASTOLIC BLOOD PRESSURE: 68 MMHG | WEIGHT: 138.8 LBS | OXYGEN SATURATION: 98 %

## 2023-11-13 DIAGNOSIS — Z12.5 SCREENING PSA (PROSTATE SPECIFIC ANTIGEN): ICD-10-CM

## 2023-11-13 DIAGNOSIS — Z13.220 SCREENING, LIPID: ICD-10-CM

## 2023-11-13 DIAGNOSIS — R00.2 PALPITATIONS: ICD-10-CM

## 2023-11-13 DIAGNOSIS — F41.1 GENERALIZED ANXIETY DISORDER: ICD-10-CM

## 2023-11-13 DIAGNOSIS — Z11.59 SCREENING FOR VIRAL DISEASE: ICD-10-CM

## 2023-11-13 DIAGNOSIS — Z00.00 ENCOUNTER FOR WELL ADULT EXAM WITHOUT ABNORMAL FINDINGS: Primary | ICD-10-CM

## 2023-11-13 LAB
ALBUMIN SERPL-MCNC: 4.5 G/DL (ref 3.4–5)
ALBUMIN/GLOB SERPL: 2.5 {RATIO} (ref 1.1–2.2)
ALP SERPL-CCNC: 56 U/L (ref 40–129)
ALT SERPL-CCNC: 28 U/L (ref 10–40)
ANION GAP SERPL CALCULATED.3IONS-SCNC: 12 MMOL/L (ref 3–16)
AST SERPL-CCNC: 22 U/L (ref 15–37)
BILIRUB SERPL-MCNC: 0.3 MG/DL (ref 0–1)
BUN SERPL-MCNC: 13 MG/DL (ref 7–20)
CALCIUM SERPL-MCNC: 9.4 MG/DL (ref 8.3–10.6)
CHLORIDE SERPL-SCNC: 103 MMOL/L (ref 99–110)
CHOLEST SERPL-MCNC: 182 MG/DL (ref 0–199)
CO2 SERPL-SCNC: 27 MMOL/L (ref 21–32)
CREAT SERPL-MCNC: 0.9 MG/DL (ref 0.9–1.3)
GFR SERPLBLD CREATININE-BSD FMLA CKD-EPI: >60 ML/MIN/{1.73_M2}
GLUCOSE SERPL-MCNC: 88 MG/DL (ref 70–99)
HBV SURFACE AB SERPL IA-ACNC: <3.5 MIU/ML
HDLC SERPL-MCNC: 44 MG/DL (ref 40–60)
LDLC SERPL CALC-MCNC: 122 MG/DL
POTASSIUM SERPL-SCNC: 4.3 MMOL/L (ref 3.5–5.1)
PROT SERPL-MCNC: 6.3 G/DL (ref 6.4–8.2)
PSA SERPL DL<=0.01 NG/ML-MCNC: 1.2 NG/ML (ref 0–4)
SODIUM SERPL-SCNC: 142 MMOL/L (ref 136–145)
TRIGL SERPL-MCNC: 81 MG/DL (ref 0–150)
VLDLC SERPL CALC-MCNC: 16 MG/DL

## 2023-11-13 PROCEDURE — 99396 PREV VISIT EST AGE 40-64: CPT | Performed by: INTERNAL MEDICINE

## 2023-11-13 PROCEDURE — G8484 FLU IMMUNIZE NO ADMIN: HCPCS | Performed by: INTERNAL MEDICINE

## 2023-11-13 RX ORDER — FLUTICASONE PROPIONATE 50 MCG
2 SPRAY, SUSPENSION (ML) NASAL DAILY
Qty: 16 G | Refills: 5 | Status: SHIPPED | OUTPATIENT
Start: 2023-11-13

## 2023-11-13 ASSESSMENT — ENCOUNTER SYMPTOMS
SHORTNESS OF BREATH: 1
RHINORRHEA: 0
SORE THROAT: 0
ABDOMINAL PAIN: 0
DIARRHEA: 0
NAUSEA: 0
COUGH: 0
TROUBLE SWALLOWING: 0

## 2023-11-13 NOTE — PROGRESS NOTES
nursing note reviewed. Constitutional:       General: He is not in acute distress. Appearance: He is well-developed. HENT:      Head: Normocephalic and atraumatic. Right Ear: Tympanic membrane and external ear normal.      Left Ear: External ear normal.      Ears:      Comments: Left tm effusion     Nose: Congestion present. Eyes:      General: No scleral icterus. Pupils: Pupils are equal, round, and reactive to light. Neck:      Thyroid: No thyromegaly. Cardiovascular:      Rate and Rhythm: Normal rate and regular rhythm. Heart sounds: No murmur heard. Pulmonary:      Effort: No respiratory distress. Breath sounds: Normal breath sounds. No wheezing or rales. Abdominal:      General: Bowel sounds are normal. There is no distension. Palpations: Abdomen is soft. Tenderness: There is no abdominal tenderness. Musculoskeletal:         General: No deformity. Normal range of motion. Cervical back: Normal range of motion. Right lower leg: No edema. Left lower leg: No edema. Lymphadenopathy:      Cervical: No cervical adenopathy. Skin:     General: Skin is warm and dry. Neurological:      Mental Status: He is alert and oriented to person, place, and time. Cranial Nerves: No cranial nerve deficit. Sensory: No sensory deficit. Coordination: Coordination normal.   Psychiatric:         Thought Content: Thought content normal.         Assessment   Plan   1. Encounter for well adult exam without abnormal findings  2. Screening, lipid  -     Lipid Panel; Future  3. Screening PSA (prostate specific antigen)  -     PSA Screening; Future  4. Generalized anxiety disorder  Patient is reasonably stable on Lexapro and buspirone. He has an appointment with Dr. Shiloh Morales at the end of this month. We discussed that some of what he is feeling is a normal grief reaction.   Encouraged him to get established with an alternate psychologist as Dr. Alejandra Blanchard is leaving

## 2023-11-13 NOTE — PATIENT INSTRUCTIONS
Covid vaccine recommended at pharmacy    Call neurology and urology to see if FU is needed  ---    Echo  [id number]   --  ENT-Dr Martinez- if tinnitus persists  Well Visit 50 to 65: Care Instructions  Overview     Well visits can help you stay healthy. Your doctor has checked your overall health and may have suggested ways to take good care of yourself. Your doctor also may have recommended tests. At home, you can help prevent illness with healthy eating, regular exercise, and other steps. Follow-up care is a key part of your treatment and safety. Be sure to make and go to all appointments, and call your doctor if you are having problems. It's also a good idea to know your test results and keep a list of the medicines you take. How can you care for yourself at home? Get screening tests that you and your doctor decide on. Screening helps find diseases before any symptoms appear. Eat healthy foods. Choose fruits, vegetables, whole grains, protein, and low-fat dairy foods. Limit fat, especially saturated fat. Reduce salt in your diet. Limit alcohol. Have no more than 2 drinks a day or 14 drinks a week. Get at least 30 minutes of exercise on most days of the week. Walking is a good choice. You also may want to do other activities, such as running, swimming, cycling, or playing tennis or team sports. Reach and stay at a healthy weight. This will lower your risk for many problems, such as obesity, diabetes, heart disease, and high blood pressure. Do not smoke. Smoking can make health problems worse. If you need help quitting, talk to your doctor about stop-smoking programs and medicines. These can increase your chances of quitting for good. Care for your mental health. It is easy to get weighed down by worry and stress. Learn strategies to manage stress, like deep breathing and mindfulness, and stay connected with your family and community. If you find you often feel sad or hopeless, talk with your doctor.  Treatment

## 2023-11-25 DIAGNOSIS — F41.0 GENERALIZED ANXIETY DISORDER WITH PANIC ATTACKS: ICD-10-CM

## 2023-11-25 DIAGNOSIS — F41.1 GENERALIZED ANXIETY DISORDER WITH PANIC ATTACKS: ICD-10-CM

## 2023-11-27 RX ORDER — ESCITALOPRAM OXALATE 5 MG/1
5 TABLET ORAL DAILY
Qty: 30 TABLET | Refills: 2 | OUTPATIENT
Start: 2023-11-27

## 2023-11-27 NOTE — PROGRESS NOTES
speaking so slowly that other people could have noticed. Or the opposite - being so fidgety or restless that you have been moving around a lot more than usual 0 0   Thoughts that you would be better off dead, or of hurting yourself in some way 0 0   PHQ-9 Total Score 13 15   If you checked off any problems, how difficult have these problems made it for you to do your work, take care of things at home, or get along with other people? 1 1         10/16/2023     9:00 AM 10/5/2023     9:00 AM   HARI-7 SCREENING   Feeling nervous, anxious, or on edge Nearly every day Nearly every day   Not being able to stop or control worrying Nearly every day Nearly every day   Worrying too much about different things Nearly every day Nearly every day   Trouble relaxing More than half the days Nearly every day   Being so restless that it is hard to sit still Not at all Not at all   Becoming easily annoyed or irritable Nearly every day Nearly every day   Feeling afraid as if something awful might happen Nearly every day More than half the days   HARI-7 Total Score 17 17   How difficult have these problems made it for you to do your work, take care of things at home, or get along with other people?  Somewhat difficult Somewhat difficult        Labs:     Orders Only on 11/13/2023   Component Date Value Ref Range Status    Hep B S Ab 11/13/2023 <3.50  mIU/mL Final    Comment: <8.5 = Negative  >=8.5 and <11.5 = Indeterminate  >=11.5 = Positive (Immune)      PSA 11/13/2023 1.20  0.00 - 4.00 ng/mL Final    Sodium 11/13/2023 142  136 - 145 mmol/L Final    Potassium 11/13/2023 4.3  3.5 - 5.1 mmol/L Final    Chloride 11/13/2023 103  99 - 110 mmol/L Final    CO2 11/13/2023 27  21 - 32 mmol/L Final    Anion Gap 11/13/2023 12  3 - 16 Final    Glucose 11/13/2023 88  70 - 99 mg/dL Final    BUN 11/13/2023 13  7 - 20 mg/dL Final    Creatinine 11/13/2023 0.9  0.9 - 1.3 mg/dL Final    Est, Glom Filt Rate 11/13/2023 >60  >60 Final    Comment: Pediatric

## 2023-11-29 ENCOUNTER — OFFICE VISIT (OUTPATIENT)
Dept: PSYCHIATRY | Age: 53
End: 2023-11-29
Payer: COMMERCIAL

## 2023-11-29 VITALS
BODY MASS INDEX: 24.77 KG/M2 | HEIGHT: 63 IN | DIASTOLIC BLOOD PRESSURE: 76 MMHG | SYSTOLIC BLOOD PRESSURE: 118 MMHG | WEIGHT: 139.8 LBS | HEART RATE: 88 BPM

## 2023-11-29 DIAGNOSIS — F40.10 SOCIAL ANXIETY DISORDER: ICD-10-CM

## 2023-11-29 DIAGNOSIS — F43.23 ADJUSTMENT REACTION WITH ANXIETY AND DEPRESSION: ICD-10-CM

## 2023-11-29 DIAGNOSIS — F41.0 GENERALIZED ANXIETY DISORDER WITH PANIC ATTACKS: ICD-10-CM

## 2023-11-29 DIAGNOSIS — F41.1 GENERALIZED ANXIETY DISORDER WITH PANIC ATTACKS: ICD-10-CM

## 2023-11-29 DIAGNOSIS — F33.1 MAJOR DEPRESSIVE DISORDER, RECURRENT EPISODE, MODERATE (HCC): ICD-10-CM

## 2023-11-29 DIAGNOSIS — Z63.4 BEREAVEMENT: Primary | ICD-10-CM

## 2023-11-29 PROCEDURE — G8420 CALC BMI NORM PARAMETERS: HCPCS | Performed by: STUDENT IN AN ORGANIZED HEALTH CARE EDUCATION/TRAINING PROGRAM

## 2023-11-29 PROCEDURE — 90833 PSYTX W PT W E/M 30 MIN: CPT | Performed by: STUDENT IN AN ORGANIZED HEALTH CARE EDUCATION/TRAINING PROGRAM

## 2023-11-29 PROCEDURE — 3017F COLORECTAL CA SCREEN DOC REV: CPT | Performed by: STUDENT IN AN ORGANIZED HEALTH CARE EDUCATION/TRAINING PROGRAM

## 2023-11-29 PROCEDURE — 1036F TOBACCO NON-USER: CPT | Performed by: STUDENT IN AN ORGANIZED HEALTH CARE EDUCATION/TRAINING PROGRAM

## 2023-11-29 PROCEDURE — 99214 OFFICE O/P EST MOD 30 MIN: CPT | Performed by: STUDENT IN AN ORGANIZED HEALTH CARE EDUCATION/TRAINING PROGRAM

## 2023-11-29 PROCEDURE — G8484 FLU IMMUNIZE NO ADMIN: HCPCS | Performed by: STUDENT IN AN ORGANIZED HEALTH CARE EDUCATION/TRAINING PROGRAM

## 2023-11-29 PROCEDURE — G8427 DOCREV CUR MEDS BY ELIG CLIN: HCPCS | Performed by: STUDENT IN AN ORGANIZED HEALTH CARE EDUCATION/TRAINING PROGRAM

## 2023-11-29 RX ORDER — ESCITALOPRAM OXALATE 20 MG/1
20 TABLET ORAL DAILY
Qty: 30 TABLET | Refills: 2 | Status: SHIPPED | OUTPATIENT
Start: 2023-11-29

## 2023-11-29 RX ORDER — BUSPIRONE HYDROCHLORIDE 10 MG/1
10 TABLET ORAL 2 TIMES DAILY
Qty: 60 TABLET | Refills: 2 | Status: SHIPPED | OUTPATIENT
Start: 2023-11-29

## 2023-11-29 RX ORDER — HYDROXYZINE HYDROCHLORIDE 25 MG/1
25 TABLET, FILM COATED ORAL 2 TIMES DAILY PRN
Qty: 28 TABLET | Refills: 0 | Status: SHIPPED | OUTPATIENT
Start: 2023-11-29 | End: 2023-12-13

## 2023-11-29 SDOH — SOCIAL STABILITY - SOCIAL INSECURITY: DISSAPEARANCE AND DEATH OF FAMILY MEMBER: Z63.4

## 2023-11-29 ASSESSMENT — ENCOUNTER SYMPTOMS
ALLERGIC/IMMUNOLOGIC NEGATIVE: 1
RESPIRATORY NEGATIVE: 1
GASTROINTESTINAL NEGATIVE: 1
EYES NEGATIVE: 1

## 2023-11-29 ASSESSMENT — PATIENT HEALTH QUESTIONNAIRE - PHQ9
SUM OF ALL RESPONSES TO PHQ9 QUESTIONS 1 & 2: 6
7. TROUBLE CONCENTRATING ON THINGS, SUCH AS READING THE NEWSPAPER OR WATCHING TELEVISION: 1
1. LITTLE INTEREST OR PLEASURE IN DOING THINGS: 3
SUM OF ALL RESPONSES TO PHQ QUESTIONS 1-9: 16
8. MOVING OR SPEAKING SO SLOWLY THAT OTHER PEOPLE COULD HAVE NOTICED. OR THE OPPOSITE, BEING SO FIGETY OR RESTLESS THAT YOU HAVE BEEN MOVING AROUND A LOT MORE THAN USUAL: 0
9. THOUGHTS THAT YOU WOULD BE BETTER OFF DEAD, OR OF HURTING YOURSELF: 0
4. FEELING TIRED OR HAVING LITTLE ENERGY: 3
5. POOR APPETITE OR OVEREATING: 0
SUM OF ALL RESPONSES TO PHQ QUESTIONS 1-9: 16
SUM OF ALL RESPONSES TO PHQ QUESTIONS 1-9: 16
6. FEELING BAD ABOUT YOURSELF - OR THAT YOU ARE A FAILURE OR HAVE LET YOURSELF OR YOUR FAMILY DOWN: 3
SUM OF ALL RESPONSES TO PHQ QUESTIONS 1-9: 16
3. TROUBLE FALLING OR STAYING ASLEEP: 3
2. FEELING DOWN, DEPRESSED OR HOPELESS: 3
10. IF YOU CHECKED OFF ANY PROBLEMS, HOW DIFFICULT HAVE THESE PROBLEMS MADE IT FOR YOU TO DO YOUR WORK, TAKE CARE OF THINGS AT HOME, OR GET ALONG WITH OTHER PEOPLE: 1

## 2023-11-29 ASSESSMENT — ANXIETY QUESTIONNAIRES
GAD7 TOTAL SCORE: 18
IF YOU CHECKED OFF ANY PROBLEMS ON THIS QUESTIONNAIRE, HOW DIFFICULT HAVE THESE PROBLEMS MADE IT FOR YOU TO DO YOUR WORK, TAKE CARE OF THINGS AT HOME, OR GET ALONG WITH OTHER PEOPLE: SOMEWHAT DIFFICULT
7. FEELING AFRAID AS IF SOMETHING AWFUL MIGHT HAPPEN: 2
3. WORRYING TOO MUCH ABOUT DIFFERENT THINGS: 3
2. NOT BEING ABLE TO STOP OR CONTROL WORRYING: 3
4. TROUBLE RELAXING: 3
6. BECOMING EASILY ANNOYED OR IRRITABLE: 3
1. FEELING NERVOUS, ANXIOUS, OR ON EDGE: 3
5. BEING SO RESTLESS THAT IT IS HARD TO SIT STILL: 1

## 2023-12-28 ENCOUNTER — TELEPHONE (OUTPATIENT)
Dept: INTERNAL MEDICINE CLINIC | Age: 53
End: 2023-12-28

## 2023-12-28 DIAGNOSIS — R00.2 PALPITATIONS: Primary | ICD-10-CM

## 2023-12-28 NOTE — TELEPHONE ENCOUNTER
----- Message from Ashley Rinner sent at 12/28/2023 10:53 AM EST -----  Subject: Message to Provider    QUESTIONS  Information for Provider? Pt needs the order for the ECHO 2D doppler with   color that was scheduled with previous [employer] insurance on 1/2/23. Pt may   need to r/s that appt. Pt needs the order resent/prior authorization to   his new insurance company, [email or web address]. Please advise Pt if this   insurance is still in network for PCP.  ---------------------------------------------------------------------------  --------------  CALL BACK INFO  [phone]; OK to leave message on voicemail  ---------------------------------------------------------------------------  --------------  SCRIPT ANSWERS  Relationship to Patient? Self

## 2023-12-28 NOTE — TELEPHONE ENCOUNTER
Information for Provider? Pt needs the order for the ECHO 2D doppler with   color that was scheduled with previous [employer] insurance on 1/2/23. Pt may   need to r/s that appt. Pt needs the order resent/prior authorization to   his new insurance company, [employer]. Please advise Pt if this   insurance is still in network for PCP.   ------------------------------------------

## 2023-12-29 NOTE — TELEPHONE ENCOUNTER
Is this something prior authorization dept can help with? Far as making sure Doppler is covered by patient's new insurance?

## 2024-01-02 ENCOUNTER — HOSPITAL ENCOUNTER (OUTPATIENT)
Dept: NON INVASIVE DIAGNOSTICS | Age: 54
Discharge: HOME | End: 2024-01-02
Payer: COMMERCIAL

## 2024-01-02 DIAGNOSIS — R00.2 PALPITATIONS: ICD-10-CM

## 2024-01-02 PROCEDURE — 93306 TTE W/DOPPLER COMPLETE: CPT

## 2024-01-02 PROCEDURE — 93356 MYOCRD STRAIN IMG SPCKL TRCK: CPT

## 2024-01-08 NOTE — PROGRESS NOTES
PSYCHIATRY PROGRESS NOTE    Iglesia Hayes  1970  01/10/2024  Face to Face time: 45 minutes, of which 20 minutes were spent in supportive psychotherapy  PCP: Ela Guerra MD    CC:   Chief Complaint   Patient presents with    Follow-up       Patient is a 53 y.o. male without significant past medical history who presents to the outpatient psychiatric clinic today for evaluation and management of anxiety.    A:  Patient's presentation today is indicative of continued difficulties with bereavement/adjustment reaction issues in addition to challenges that he faces associated with the personality disorder concerns that were mentioned previously.  We will continue to follow and provide him with ongoing support.    Diagnosis:  Acute adjustment reaction with anxiety and depression  Bereavement, acute on subacute  HARI w/ panic vs panic disorder  Social anxiety disorder, severe  ?DPD    P:   1.  Add bupropion  mg daily for treatment of depression concerns.  Patient was advised on potential side effects of this medication including headaches, increased blood pressure/heart rate, insomnia, appetite suppression, and increased suicidal ideations.  2.  Continue current medications of escitalopram 20 mg daily and buspirone 10 mg twice daily    Medication Monitoring:    - PDMP reviewed: No current prescriptions     Follow-up: 6 weeks    Safety: Pt was counseled on the potential for increased suicidal ideations and advised on potential options for dealing with these including hotlines, calling the office, or going to the nearest emergency room.      __________________________________________________________________________    S:   Patient identified that some things had gone okay for him but largely things were unchanged compared to previous.  He did end up having a very happy holiday season, spending time up with his sister in [place].  He noted that interestingly that he felt more anxiety coming back

## 2024-01-10 ENCOUNTER — OFFICE VISIT (OUTPATIENT)
Dept: PSYCHIATRY | Age: 54
End: 2024-01-10
Payer: COMMERCIAL

## 2024-01-10 VITALS
HEIGHT: 63 IN | WEIGHT: 143.2 LBS | DIASTOLIC BLOOD PRESSURE: 72 MMHG | BODY MASS INDEX: 25.37 KG/M2 | SYSTOLIC BLOOD PRESSURE: 116 MMHG

## 2024-01-10 DIAGNOSIS — F33.1 MAJOR DEPRESSIVE DISORDER, RECURRENT EPISODE, MODERATE (HCC): ICD-10-CM

## 2024-01-10 DIAGNOSIS — F43.23 ADJUSTMENT REACTION WITH ANXIETY AND DEPRESSION: Primary | ICD-10-CM

## 2024-01-10 PROCEDURE — 99214 OFFICE O/P EST MOD 30 MIN: CPT | Performed by: STUDENT IN AN ORGANIZED HEALTH CARE EDUCATION/TRAINING PROGRAM

## 2024-01-10 PROCEDURE — 90833 PSYTX W PT W E/M 30 MIN: CPT | Performed by: STUDENT IN AN ORGANIZED HEALTH CARE EDUCATION/TRAINING PROGRAM

## 2024-01-10 RX ORDER — BUPROPION HYDROCHLORIDE 100 MG/1
100 TABLET, EXTENDED RELEASE ORAL DAILY
Qty: 60 TABLET | Refills: 2 | Status: SHIPPED | OUTPATIENT
Start: 2024-01-10

## 2024-01-10 ASSESSMENT — PATIENT HEALTH QUESTIONNAIRE - PHQ9
7. TROUBLE CONCENTRATING ON THINGS, SUCH AS READING THE NEWSPAPER OR WATCHING TELEVISION: 0
SUM OF ALL RESPONSES TO PHQ QUESTIONS 1-9: 13
5. POOR APPETITE OR OVEREATING: 0
SUM OF ALL RESPONSES TO PHQ QUESTIONS 1-9: 13
8. MOVING OR SPEAKING SO SLOWLY THAT OTHER PEOPLE COULD HAVE NOTICED. OR THE OPPOSITE, BEING SO FIGETY OR RESTLESS THAT YOU HAVE BEEN MOVING AROUND A LOT MORE THAN USUAL: 0
2. FEELING DOWN, DEPRESSED OR HOPELESS: 3
4. FEELING TIRED OR HAVING LITTLE ENERGY: 3
1. LITTLE INTEREST OR PLEASURE IN DOING THINGS: 3
9. THOUGHTS THAT YOU WOULD BE BETTER OFF DEAD, OR OF HURTING YOURSELF: 0
10. IF YOU CHECKED OFF ANY PROBLEMS, HOW DIFFICULT HAVE THESE PROBLEMS MADE IT FOR YOU TO DO YOUR WORK, TAKE CARE OF THINGS AT HOME, OR GET ALONG WITH OTHER PEOPLE: 1
SUM OF ALL RESPONSES TO PHQ QUESTIONS 1-9: 13
SUM OF ALL RESPONSES TO PHQ QUESTIONS 1-9: 13
3. TROUBLE FALLING OR STAYING ASLEEP: 2
SUM OF ALL RESPONSES TO PHQ9 QUESTIONS 1 & 2: 6
6. FEELING BAD ABOUT YOURSELF - OR THAT YOU ARE A FAILURE OR HAVE LET YOURSELF OR YOUR FAMILY DOWN: 2

## 2024-01-10 ASSESSMENT — ANXIETY QUESTIONNAIRES
5. BEING SO RESTLESS THAT IT IS HARD TO SIT STILL: 0
2. NOT BEING ABLE TO STOP OR CONTROL WORRYING: 3
1. FEELING NERVOUS, ANXIOUS, OR ON EDGE: 3
7. FEELING AFRAID AS IF SOMETHING AWFUL MIGHT HAPPEN: 1
IF YOU CHECKED OFF ANY PROBLEMS ON THIS QUESTIONNAIRE, HOW DIFFICULT HAVE THESE PROBLEMS MADE IT FOR YOU TO DO YOUR WORK, TAKE CARE OF THINGS AT HOME, OR GET ALONG WITH OTHER PEOPLE: SOMEWHAT DIFFICULT
6. BECOMING EASILY ANNOYED OR IRRITABLE: 2
4. TROUBLE RELAXING: 2
GAD7 TOTAL SCORE: 14
3. WORRYING TOO MUCH ABOUT DIFFERENT THINGS: 3

## 2024-01-11 DIAGNOSIS — F41.0 GENERALIZED ANXIETY DISORDER WITH PANIC ATTACKS: ICD-10-CM

## 2024-01-11 DIAGNOSIS — F41.1 GENERALIZED ANXIETY DISORDER WITH PANIC ATTACKS: ICD-10-CM

## 2024-01-11 RX ORDER — ESCITALOPRAM OXALATE 10 MG/1
10 TABLET ORAL DAILY
Qty: 30 TABLET | Refills: 2 | OUTPATIENT
Start: 2024-01-11

## 2024-02-14 NOTE — PROGRESS NOTES
PSYCHIATRY PROGRESS NOTE    Iglesia Hayes  1970 02/21/2024  Face to Face time: 45 minutes, of which 20 minutes were spent in supportive psychotherapy  PCP: Ela Guerra MD    CC:   Chief Complaint   Patient presents with    Follow-up       Patient is a 53 y.o. male without significant past medical history who presents to the outpatient psychiatric clinic today for evaluation and management of anxiety.      A:  Patient's presentation today is indicative of continued difficulties with an acute adjustment reaction secondary to the bereavement issues that he suffered last year.  He is now currently having difficulties with insomnia that we will attempt to support.    Diagnosis:  Acute adjustment reaction with anxiety and depression  Bereavement, acute on subacute  HARI w/ panic vs panic disorder  Social anxiety disorder, severe  ?DPD    P:   1.  Temporarily discontinue bupropion at this time.  2.  Continue current medications of escitalopram 20 mg nightly, and buspirone 10 mg twice daily.  3.  Add trazodone 50 mg nightly as needed.  Patient was cautioned regarding adverse effects this medication including sedation, grogginess, dizziness, dry mouth, and headaches amongst others.      Medication Monitoring:    - PDMP reviewed: No current prescriptions    Follow-up: 4 weeks    Safety: Pt was counseled on the potential for increased suicidal ideations and advised on potential options for dealing with these including hotlines, calling the office, or going to the nearest emergency room.      __________________________________________________________________________    S:   Patient identified that he was having continued difficulties at this time with depressive moods as well as sleeping issues.  He noted that he is not sleeping well, getting about 4 hours nightly whereas he feels he needs to about 6-7.  He stated that some nights he can get about 3 to 4 hours straight before waking up, with other nights

## 2024-02-21 ENCOUNTER — OFFICE VISIT (OUTPATIENT)
Dept: PSYCHIATRY | Age: 54
End: 2024-02-21
Payer: COMMERCIAL

## 2024-02-21 VITALS
DIASTOLIC BLOOD PRESSURE: 84 MMHG | RESPIRATION RATE: 12 BRPM | SYSTOLIC BLOOD PRESSURE: 124 MMHG | WEIGHT: 144.8 LBS | HEART RATE: 68 BPM | BODY MASS INDEX: 25.65 KG/M2

## 2024-02-21 DIAGNOSIS — F33.1 MAJOR DEPRESSIVE DISORDER, RECURRENT EPISODE, MODERATE (HCC): Primary | ICD-10-CM

## 2024-02-21 DIAGNOSIS — F41.0 GENERALIZED ANXIETY DISORDER WITH PANIC ATTACKS: ICD-10-CM

## 2024-02-21 DIAGNOSIS — F41.1 GENERALIZED ANXIETY DISORDER WITH PANIC ATTACKS: ICD-10-CM

## 2024-02-21 PROCEDURE — 90833 PSYTX W PT W E/M 30 MIN: CPT | Performed by: STUDENT IN AN ORGANIZED HEALTH CARE EDUCATION/TRAINING PROGRAM

## 2024-02-21 PROCEDURE — 99214 OFFICE O/P EST MOD 30 MIN: CPT | Performed by: STUDENT IN AN ORGANIZED HEALTH CARE EDUCATION/TRAINING PROGRAM

## 2024-02-21 RX ORDER — TRAZODONE HYDROCHLORIDE 50 MG/1
50 TABLET ORAL NIGHTLY PRN
Qty: 30 TABLET | Refills: 2 | Status: SHIPPED | OUTPATIENT
Start: 2024-02-21

## 2024-02-21 RX ORDER — BUSPIRONE HYDROCHLORIDE 10 MG/1
10 TABLET ORAL 2 TIMES DAILY
Qty: 60 TABLET | Refills: 2 | Status: SHIPPED | OUTPATIENT
Start: 2024-02-21

## 2024-02-21 RX ORDER — ESCITALOPRAM OXALATE 20 MG/1
20 TABLET ORAL DAILY
Qty: 30 TABLET | Refills: 2 | Status: SHIPPED | OUTPATIENT
Start: 2024-02-21

## 2024-02-21 ASSESSMENT — PATIENT HEALTH QUESTIONNAIRE - PHQ9
SUM OF ALL RESPONSES TO PHQ QUESTIONS 1-9: 14
SUM OF ALL RESPONSES TO PHQ9 QUESTIONS 1 & 2: 6
2. FEELING DOWN, DEPRESSED OR HOPELESS: 3
1. LITTLE INTEREST OR PLEASURE IN DOING THINGS: 3
5. POOR APPETITE OR OVEREATING: 0
3. TROUBLE FALLING OR STAYING ASLEEP: 3
SUM OF ALL RESPONSES TO PHQ QUESTIONS 1-9: 14
SUM OF ALL RESPONSES TO PHQ QUESTIONS 1-9: 14
9. THOUGHTS THAT YOU WOULD BE BETTER OFF DEAD, OR OF HURTING YOURSELF: 0
7. TROUBLE CONCENTRATING ON THINGS, SUCH AS READING THE NEWSPAPER OR WATCHING TELEVISION: 0
6. FEELING BAD ABOUT YOURSELF - OR THAT YOU ARE A FAILURE OR HAVE LET YOURSELF OR YOUR FAMILY DOWN: 2
10. IF YOU CHECKED OFF ANY PROBLEMS, HOW DIFFICULT HAVE THESE PROBLEMS MADE IT FOR YOU TO DO YOUR WORK, TAKE CARE OF THINGS AT HOME, OR GET ALONG WITH OTHER PEOPLE: 1
8. MOVING OR SPEAKING SO SLOWLY THAT OTHER PEOPLE COULD HAVE NOTICED. OR THE OPPOSITE, BEING SO FIGETY OR RESTLESS THAT YOU HAVE BEEN MOVING AROUND A LOT MORE THAN USUAL: 0
SUM OF ALL RESPONSES TO PHQ QUESTIONS 1-9: 14
4. FEELING TIRED OR HAVING LITTLE ENERGY: 3

## 2024-02-21 ASSESSMENT — ANXIETY QUESTIONNAIRES
5. BEING SO RESTLESS THAT IT IS HARD TO SIT STILL: 0
GAD7 TOTAL SCORE: 12
2. NOT BEING ABLE TO STOP OR CONTROL WORRYING: 2
6. BECOMING EASILY ANNOYED OR IRRITABLE: 3
7. FEELING AFRAID AS IF SOMETHING AWFUL MIGHT HAPPEN: 1
IF YOU CHECKED OFF ANY PROBLEMS ON THIS QUESTIONNAIRE, HOW DIFFICULT HAVE THESE PROBLEMS MADE IT FOR YOU TO DO YOUR WORK, TAKE CARE OF THINGS AT HOME, OR GET ALONG WITH OTHER PEOPLE: SOMEWHAT DIFFICULT
4. TROUBLE RELAXING: 1
1. FEELING NERVOUS, ANXIOUS, OR ON EDGE: 2
3. WORRYING TOO MUCH ABOUT DIFFERENT THINGS: 3

## 2024-02-21 ASSESSMENT — ENCOUNTER SYMPTOMS
EYES NEGATIVE: 1
ALLERGIC/IMMUNOLOGIC NEGATIVE: 1
RESPIRATORY NEGATIVE: 1
GASTROINTESTINAL NEGATIVE: 1

## 2024-03-14 NOTE — PROGRESS NOTES
Feeling afraid as if something awful might happen Several days Several days   HARI-7 Total Score 16 12   How difficult have these problems made it for you to do your work, take care of things at home, or get along with other people? Somewhat difficult Somewhat difficult        Labs:     Orders Only on 11/13/2023   Component Date Value Ref Range Status    Hep B S Ab 11/13/2023 <3.50  mIU/mL Final    Comment: <8.5 = Negative  >=8.5 and <11.5 = Indeterminate  >=11.5 = Positive (Immune)      PSA 11/13/2023 1.20  0.00 - 4.00 ng/mL Final    Sodium 11/13/2023 142  136 - 145 mmol/L Final    Potassium 11/13/2023 4.3  3.5 - 5.1 mmol/L Final    Chloride 11/13/2023 103  99 - 110 mmol/L Final    CO2 11/13/2023 27  21 - 32 mmol/L Final    Anion Gap 11/13/2023 12  3 - 16 Final    Glucose 11/13/2023 88  70 - 99 mg/dL Final    BUN 11/13/2023 13  7 - 20 mg/dL Final    Creatinine 11/13/2023 0.9  0.9 - 1.3 mg/dL Final    Est, Glom Filt Rate 11/13/2023 >60  >60 Final    Comment: Pediatric calculator link  https://www.kidney.org/professionals/kdoqi/gfr_calculatorped  Effective Oct 3, 2022  These results are not intended for use in patients  <18 years of age.  eGFR results are calculated without  a race factor using the 2021 CKD-EPI equation.  Careful  clinical correlation is recommended, particularly when  comparing to results calculated using previous equations.  The CKD-EPI equation is less accurate in patients with  extremes of muscle mass, extra-renal metabolism of  creatinine, excessive creatinine ingestion, or following  therapy that affects renal tubular secretion.      Calcium 11/13/2023 9.4  8.3 - 10.6 mg/dL Final    Total Protein 11/13/2023 6.3 (L)  6.4 - 8.2 g/dL Final    Albumin 11/13/2023 4.5  3.4 - 5.0 g/dL Final    Albumin/Globulin Ratio 11/13/2023 2.5 (H)  1.1 - 2.2 Final    Total Bilirubin 11/13/2023 0.3  0.0 - 1.0 mg/dL Final    Alkaline Phosphatase 11/13/2023 56  40 - 129 U/L Final    ALT 11/13/2023 28  10 - 40 U/L

## 2024-03-21 ENCOUNTER — OFFICE VISIT (OUTPATIENT)
Dept: PSYCHIATRY | Age: 54
End: 2024-03-21
Payer: COMMERCIAL

## 2024-03-21 VITALS
WEIGHT: 148 LBS | DIASTOLIC BLOOD PRESSURE: 74 MMHG | OXYGEN SATURATION: 96 % | HEIGHT: 63 IN | HEART RATE: 79 BPM | SYSTOLIC BLOOD PRESSURE: 112 MMHG | BODY MASS INDEX: 26.22 KG/M2

## 2024-03-21 DIAGNOSIS — F41.1 GENERALIZED ANXIETY DISORDER WITH PANIC ATTACKS: Primary | ICD-10-CM

## 2024-03-21 DIAGNOSIS — F41.0 GENERALIZED ANXIETY DISORDER WITH PANIC ATTACKS: Primary | ICD-10-CM

## 2024-03-21 DIAGNOSIS — F33.1 MAJOR DEPRESSIVE DISORDER, RECURRENT EPISODE, MODERATE (HCC): ICD-10-CM

## 2024-03-21 DIAGNOSIS — F40.10 SOCIAL ANXIETY DISORDER: ICD-10-CM

## 2024-03-21 PROCEDURE — 99214 OFFICE O/P EST MOD 30 MIN: CPT | Performed by: STUDENT IN AN ORGANIZED HEALTH CARE EDUCATION/TRAINING PROGRAM

## 2024-03-21 ASSESSMENT — PATIENT HEALTH QUESTIONNAIRE - PHQ9
SUM OF ALL RESPONSES TO PHQ QUESTIONS 1-9: 13
1. LITTLE INTEREST OR PLEASURE IN DOING THINGS: NEARLY EVERY DAY
SUM OF ALL RESPONSES TO PHQ QUESTIONS 1-9: 13
9. THOUGHTS THAT YOU WOULD BE BETTER OFF DEAD, OR OF HURTING YOURSELF: NOT AT ALL
2. FEELING DOWN, DEPRESSED OR HOPELESS: NEARLY EVERY DAY
6. FEELING BAD ABOUT YOURSELF - OR THAT YOU ARE A FAILURE OR HAVE LET YOURSELF OR YOUR FAMILY DOWN: MORE THAN HALF THE DAYS
5. POOR APPETITE OR OVEREATING: NOT AT ALL
SUM OF ALL RESPONSES TO PHQ QUESTIONS 1-9: 13
4. FEELING TIRED OR HAVING LITTLE ENERGY: NEARLY EVERY DAY
SUM OF ALL RESPONSES TO PHQ QUESTIONS 1-9: 13
7. TROUBLE CONCENTRATING ON THINGS, SUCH AS READING THE NEWSPAPER OR WATCHING TELEVISION: NOT AT ALL
10. IF YOU CHECKED OFF ANY PROBLEMS, HOW DIFFICULT HAVE THESE PROBLEMS MADE IT FOR YOU TO DO YOUR WORK, TAKE CARE OF THINGS AT HOME, OR GET ALONG WITH OTHER PEOPLE: SOMEWHAT DIFFICULT
8. MOVING OR SPEAKING SO SLOWLY THAT OTHER PEOPLE COULD HAVE NOTICED. OR THE OPPOSITE, BEING SO FIGETY OR RESTLESS THAT YOU HAVE BEEN MOVING AROUND A LOT MORE THAN USUAL: NOT AT ALL
3. TROUBLE FALLING OR STAYING ASLEEP: MORE THAN HALF THE DAYS
SUM OF ALL RESPONSES TO PHQ9 QUESTIONS 1 & 2: 6

## 2024-03-21 ASSESSMENT — ANXIETY QUESTIONNAIRES
2. NOT BEING ABLE TO STOP OR CONTROL WORRYING: NEARLY EVERY DAY
7. FEELING AFRAID AS IF SOMETHING AWFUL MIGHT HAPPEN: SEVERAL DAYS
IF YOU CHECKED OFF ANY PROBLEMS ON THIS QUESTIONNAIRE, HOW DIFFICULT HAVE THESE PROBLEMS MADE IT FOR YOU TO DO YOUR WORK, TAKE CARE OF THINGS AT HOME, OR GET ALONG WITH OTHER PEOPLE: SOMEWHAT DIFFICULT
1. FEELING NERVOUS, ANXIOUS, OR ON EDGE: NEARLY EVERY DAY
6. BECOMING EASILY ANNOYED OR IRRITABLE: NEARLY EVERY DAY
GAD7 TOTAL SCORE: 16
4. TROUBLE RELAXING: NEARLY EVERY DAY
5. BEING SO RESTLESS THAT IT IS HARD TO SIT STILL: NOT AT ALL
3. WORRYING TOO MUCH ABOUT DIFFERENT THINGS: NEARLY EVERY DAY

## 2024-03-21 NOTE — PATIENT INSTRUCTIONS
Consider a psychotherapist.  Recommended modalities would include either a supportive psychotherapist or, Dr. Ann's preferred, a cognitive behavioral therapist (CBT).    To find a therapist, the usual resource that is used is [employer].  You can search for a therapist based on your insurance and your area code.

## 2024-03-28 ASSESSMENT — ENCOUNTER SYMPTOMS
GASTROINTESTINAL NEGATIVE: 1
RESPIRATORY NEGATIVE: 1
ALLERGIC/IMMUNOLOGIC NEGATIVE: 1
EYES NEGATIVE: 1

## 2024-04-16 NOTE — PROGRESS NOTES
PSYCHIATRY PROGRESS NOTE    Iglesia Hayes  1970 04/18/2024  Face to Face time: 30 minutes  PCP: Ela Guerra MD    CC:   Chief Complaint   Patient presents with    Follow-up       Patient is a 53 y.o. male without significant past medical history who presents to the outpatient psychiatric clinic today for evaluation and management of anxiety.    A:  Patient's presentation today is indicative of continued difficulties with his acute on chronic adjustment reaction and bereavement.  In addition, there is concern that this may be coinciding with a different long-term condition like an autism spectrum that causes some emotional blunting/decreased emotional variability.  We will continue to monitor this going forward and provide support.    Diagnosis:  Acute adjustment reaction with anxiety and depression  Bereavement, acute on subacute  HARI w/ panic vs panic disorder  Social anxiety disorder, severe  ?DPD vs autism spectrum    P:   No changes to formal medication regimen.  Patient to continue with escitalopram 20mg nightly, trazodone 25mg qhs PRN, and buspirone 10mg bid  Patient was advised to consider melatonin 5mg nightly for insomnia concerns    Medication Monitoring:    - PDMP reviewed: No current prescriptions     Follow-up: 6 weeks    Safety: Pt was counseled on the potential for increased suicidal ideations and advised on potential options for dealing with these including hotlines, calling the office, or going to the nearest emergency room.      __________________________________________________________________________    S:   Patient identified that not much change since his last office visit.  He noted that he continues to work on the house, notes that he is challenged by the fact that his father was a  of things such as radio parts and other electronics.  The patient just recently compiled an extensive spreadsheet trying to encapsulate all of the electronics in the basement.  The

## 2024-04-18 ENCOUNTER — OFFICE VISIT (OUTPATIENT)
Dept: PSYCHIATRY | Age: 54
End: 2024-04-18
Payer: COMMERCIAL

## 2024-04-18 VITALS
HEART RATE: 70 BPM | WEIGHT: 145.6 LBS | BODY MASS INDEX: 25.79 KG/M2 | DIASTOLIC BLOOD PRESSURE: 82 MMHG | RESPIRATION RATE: 12 BRPM | SYSTOLIC BLOOD PRESSURE: 120 MMHG

## 2024-04-18 DIAGNOSIS — F33.1 MAJOR DEPRESSIVE DISORDER, RECURRENT EPISODE, MODERATE (HCC): ICD-10-CM

## 2024-04-18 DIAGNOSIS — F43.23 ADJUSTMENT REACTION WITH ANXIETY AND DEPRESSION: Primary | ICD-10-CM

## 2024-04-18 DIAGNOSIS — Z63.4 BEREAVEMENT: ICD-10-CM

## 2024-04-18 DIAGNOSIS — F41.1 GENERALIZED ANXIETY DISORDER WITH PANIC ATTACKS: ICD-10-CM

## 2024-04-18 DIAGNOSIS — F41.0 GENERALIZED ANXIETY DISORDER WITH PANIC ATTACKS: ICD-10-CM

## 2024-04-18 DIAGNOSIS — F40.10 SOCIAL ANXIETY DISORDER: ICD-10-CM

## 2024-04-18 PROCEDURE — 99214 OFFICE O/P EST MOD 30 MIN: CPT | Performed by: STUDENT IN AN ORGANIZED HEALTH CARE EDUCATION/TRAINING PROGRAM

## 2024-04-18 RX ORDER — BUSPIRONE HYDROCHLORIDE 10 MG/1
10 TABLET ORAL 2 TIMES DAILY
Qty: 60 TABLET | Refills: 2 | Status: SHIPPED | OUTPATIENT
Start: 2024-04-18

## 2024-04-18 RX ORDER — ESCITALOPRAM OXALATE 20 MG/1
20 TABLET ORAL DAILY
Qty: 30 TABLET | Refills: 2 | Status: SHIPPED | OUTPATIENT
Start: 2024-04-18

## 2024-04-18 SDOH — SOCIAL STABILITY - SOCIAL INSECURITY: DISSAPEARANCE AND DEATH OF FAMILY MEMBER: Z63.4

## 2024-04-18 ASSESSMENT — PATIENT HEALTH QUESTIONNAIRE - PHQ9
6. FEELING BAD ABOUT YOURSELF - OR THAT YOU ARE A FAILURE OR HAVE LET YOURSELF OR YOUR FAMILY DOWN: MORE THAN HALF THE DAYS
SUM OF ALL RESPONSES TO PHQ QUESTIONS 1-9: 14
10. IF YOU CHECKED OFF ANY PROBLEMS, HOW DIFFICULT HAVE THESE PROBLEMS MADE IT FOR YOU TO DO YOUR WORK, TAKE CARE OF THINGS AT HOME, OR GET ALONG WITH OTHER PEOPLE: SOMEWHAT DIFFICULT
1. LITTLE INTEREST OR PLEASURE IN DOING THINGS: NEARLY EVERY DAY
4. FEELING TIRED OR HAVING LITTLE ENERGY: NEARLY EVERY DAY
8. MOVING OR SPEAKING SO SLOWLY THAT OTHER PEOPLE COULD HAVE NOTICED. OR THE OPPOSITE, BEING SO FIGETY OR RESTLESS THAT YOU HAVE BEEN MOVING AROUND A LOT MORE THAN USUAL: NOT AT ALL
7. TROUBLE CONCENTRATING ON THINGS, SUCH AS READING THE NEWSPAPER OR WATCHING TELEVISION: NOT AT ALL
SUM OF ALL RESPONSES TO PHQ9 QUESTIONS 1 & 2: 6
3. TROUBLE FALLING OR STAYING ASLEEP: NEARLY EVERY DAY
SUM OF ALL RESPONSES TO PHQ QUESTIONS 1-9: 14
2. FEELING DOWN, DEPRESSED OR HOPELESS: NEARLY EVERY DAY
SUM OF ALL RESPONSES TO PHQ QUESTIONS 1-9: 14
SUM OF ALL RESPONSES TO PHQ QUESTIONS 1-9: 14
9. THOUGHTS THAT YOU WOULD BE BETTER OFF DEAD, OR OF HURTING YOURSELF: NOT AT ALL

## 2024-04-18 ASSESSMENT — ANXIETY QUESTIONNAIRES
1. FEELING NERVOUS, ANXIOUS, OR ON EDGE: NEARLY EVERY DAY
2. NOT BEING ABLE TO STOP OR CONTROL WORRYING: NEARLY EVERY DAY
IF YOU CHECKED OFF ANY PROBLEMS ON THIS QUESTIONNAIRE, HOW DIFFICULT HAVE THESE PROBLEMS MADE IT FOR YOU TO DO YOUR WORK, TAKE CARE OF THINGS AT HOME, OR GET ALONG WITH OTHER PEOPLE: SOMEWHAT DIFFICULT
5. BEING SO RESTLESS THAT IT IS HARD TO SIT STILL: NOT AT ALL
GAD7 TOTAL SCORE: 13
6. BECOMING EASILY ANNOYED OR IRRITABLE: MORE THAN HALF THE DAYS
7. FEELING AFRAID AS IF SOMETHING AWFUL MIGHT HAPPEN: SEVERAL DAYS
3. WORRYING TOO MUCH ABOUT DIFFERENT THINGS: MORE THAN HALF THE DAYS
4. TROUBLE RELAXING: MORE THAN HALF THE DAYS

## 2024-04-18 ASSESSMENT — ENCOUNTER SYMPTOMS
RESPIRATORY NEGATIVE: 1
EYES NEGATIVE: 1
GASTROINTESTINAL NEGATIVE: 1
ALLERGIC/IMMUNOLOGIC NEGATIVE: 1

## 2024-05-22 NOTE — PROGRESS NOTES
PSYCHIATRY PROGRESS NOTE    Iglesia Hayes  1970 05/30/2024  Face to Face time: 30 minutes  PCP: Ela Guerra MD    CC: Denied, not seem to be responding to internal stimuli  Chief Complaint   Patient presents with    Anxiety     6 week follow up        Patient is a 53 y.o. male without significant past medical history who presents to the outpatient psychiatric clinic today for evaluation and management of anxiety.    A:  Patient's presentation today is indicative of continued difficulties with his chronic bereavement as well as adjustment.  He continues to be challenged by negative moods secondary to the amount of work that he has to do to get the house ready for sale.  We will continue to follow and provide him with ongoing support.    Diagnosis:  Acute adjustment reaction with anxiety and depression  Bereavement, acute on subacute  HARI w/ panic vs panic disorder  Social anxiety disorder, severe  ?DPD vs autism spectrum    P:   1.  No changes recommended to current medication regimen.  Patient will continue with escitalopram 20 mg daily, buspirone 10 mg twice daily, and trazodone 25 mg nightly as needed    Medication Monitoring:    - PDMP reviewed: No current prescriptions     Follow-up: 2 months    Safety: Pt was counseled on the potential for increased suicidal ideations and advised on potential options for dealing with these including hotlines, calling the office, or going to the nearest emergency room.      __________________________________________________________________________    S:   Patient identified that he is still working on the house and is feeling a little bit weary of doing so.  He notes that his sisters are still helping with this a little bit, but he feels that the largest burden is to left to him.  This has left him feeling somewhat down, tired, and anxious at times.  He did note that he feels sad to be over at the house because he still has a little bit of the expectancy

## 2024-05-30 ENCOUNTER — OFFICE VISIT (OUTPATIENT)
Dept: PSYCHIATRY | Age: 54
End: 2024-05-30
Payer: COMMERCIAL

## 2024-05-30 VITALS
OXYGEN SATURATION: 95 % | DIASTOLIC BLOOD PRESSURE: 78 MMHG | HEIGHT: 63 IN | HEART RATE: 76 BPM | BODY MASS INDEX: 26.44 KG/M2 | WEIGHT: 149.2 LBS | RESPIRATION RATE: 15 BRPM | SYSTOLIC BLOOD PRESSURE: 118 MMHG

## 2024-05-30 DIAGNOSIS — F41.0 GENERALIZED ANXIETY DISORDER WITH PANIC ATTACKS: ICD-10-CM

## 2024-05-30 DIAGNOSIS — F41.1 GENERALIZED ANXIETY DISORDER WITH PANIC ATTACKS: ICD-10-CM

## 2024-05-30 PROCEDURE — 99214 OFFICE O/P EST MOD 30 MIN: CPT | Performed by: STUDENT IN AN ORGANIZED HEALTH CARE EDUCATION/TRAINING PROGRAM

## 2024-05-30 RX ORDER — ESCITALOPRAM OXALATE 20 MG/1
20 TABLET ORAL DAILY
Qty: 30 TABLET | Refills: 5 | Status: SHIPPED | OUTPATIENT
Start: 2024-05-30

## 2024-05-30 RX ORDER — BUSPIRONE HYDROCHLORIDE 10 MG/1
10 TABLET ORAL 2 TIMES DAILY
Qty: 60 TABLET | Refills: 5 | Status: SHIPPED | OUTPATIENT
Start: 2024-05-30

## 2024-05-30 ASSESSMENT — PATIENT HEALTH QUESTIONNAIRE - PHQ9
4. FEELING TIRED OR HAVING LITTLE ENERGY: NEARLY EVERY DAY
SUM OF ALL RESPONSES TO PHQ9 QUESTIONS 1 & 2: 5
1. LITTLE INTEREST OR PLEASURE IN DOING THINGS: MORE THAN HALF THE DAYS
2. FEELING DOWN, DEPRESSED OR HOPELESS: NEARLY EVERY DAY
8. MOVING OR SPEAKING SO SLOWLY THAT OTHER PEOPLE COULD HAVE NOTICED. OR THE OPPOSITE, BEING SO FIGETY OR RESTLESS THAT YOU HAVE BEEN MOVING AROUND A LOT MORE THAN USUAL: NOT AT ALL
10. IF YOU CHECKED OFF ANY PROBLEMS, HOW DIFFICULT HAVE THESE PROBLEMS MADE IT FOR YOU TO DO YOUR WORK, TAKE CARE OF THINGS AT HOME, OR GET ALONG WITH OTHER PEOPLE: SOMEWHAT DIFFICULT
SUM OF ALL RESPONSES TO PHQ QUESTIONS 1-9: 13
SUM OF ALL RESPONSES TO PHQ QUESTIONS 1-9: 13
3. TROUBLE FALLING OR STAYING ASLEEP: NEARLY EVERY DAY
7. TROUBLE CONCENTRATING ON THINGS, SUCH AS READING THE NEWSPAPER OR WATCHING TELEVISION: NOT AT ALL
6. FEELING BAD ABOUT YOURSELF - OR THAT YOU ARE A FAILURE OR HAVE LET YOURSELF OR YOUR FAMILY DOWN: MORE THAN HALF THE DAYS
9. THOUGHTS THAT YOU WOULD BE BETTER OFF DEAD, OR OF HURTING YOURSELF: NOT AT ALL
5. POOR APPETITE OR OVEREATING: NOT AT ALL
SUM OF ALL RESPONSES TO PHQ QUESTIONS 1-9: 13
SUM OF ALL RESPONSES TO PHQ QUESTIONS 1-9: 13

## 2024-05-30 ASSESSMENT — ANXIETY QUESTIONNAIRES
4. TROUBLE RELAXING: MORE THAN HALF THE DAYS
IF YOU CHECKED OFF ANY PROBLEMS ON THIS QUESTIONNAIRE, HOW DIFFICULT HAVE THESE PROBLEMS MADE IT FOR YOU TO DO YOUR WORK, TAKE CARE OF THINGS AT HOME, OR GET ALONG WITH OTHER PEOPLE: SOMEWHAT DIFFICULT
5. BEING SO RESTLESS THAT IT IS HARD TO SIT STILL: NOT AT ALL
3. WORRYING TOO MUCH ABOUT DIFFERENT THINGS: MORE THAN HALF THE DAYS
6. BECOMING EASILY ANNOYED OR IRRITABLE: MORE THAN HALF THE DAYS
7. FEELING AFRAID AS IF SOMETHING AWFUL MIGHT HAPPEN: SEVERAL DAYS
1. FEELING NERVOUS, ANXIOUS, OR ON EDGE: NEARLY EVERY DAY
GAD7 TOTAL SCORE: 12
2. NOT BEING ABLE TO STOP OR CONTROL WORRYING: MORE THAN HALF THE DAYS

## 2024-07-22 NOTE — PROGRESS NOTES
PSYCHIATRY PROGRESS NOTE    Iglesia Hayes  1970 07/24/2024  Face to Face time: 30 minutes  PCP: Ela Guerra MD    CC:   Chief Complaint   Patient presents with    Follow-up       Patient is a 53 y.o. male without significant past medical history who presents to the outpatient psychiatric clinic today for evaluation and management of anxiety.    A:  Patient's presentation today is indicative of continued difficulties with depression and anxiety.  There are strong undertones that this is more related to his underlying social anxiety as well as possibly some dependency traits.    Diagnosis:  Adjustment reaction with anxiety and depression  Bereavement, acute on subacute  HARI w/ panic vs panic disorder  Social anxiety disorder, severe  ?DPD vs autism spectrum    P:   1.  No changes to formal medication.  Patient will continue with escitalopram 20 mg daily, buspirone 10 mg twice daily, and trazodone 25 to 50 mg nightly as needed.  2.  Patient was advised to increase melatonin up to 10 mg nightly to see if this helps with sleep.  He can then consider a as needed trazodone after that point.  He was also advised on some sleep hygiene topics    Medication Monitoring:    - PDMP reviewed: No current prescriptions     Follow-up: None at this time secondary to writer's impending departure    Safety: Pt was counseled on the potential for increased suicidal ideations and advised on potential options for dealing with these including hotlines, calling the office, or going to the nearest emergency room.      __________________________________________________________________________    S:   Patient endorsed that he was struggling at the moment with the same kinds of things as before.  He is not sure that the medications are truly helping him, though he recognizes that a lot of his stress right now is coming from his external environment.  His sisters are still helping him to unpack the house, though he has

## 2024-07-24 ENCOUNTER — OFFICE VISIT (OUTPATIENT)
Dept: PSYCHIATRY | Age: 54
End: 2024-07-24
Payer: COMMERCIAL

## 2024-07-24 VITALS
OXYGEN SATURATION: 97 % | HEIGHT: 63 IN | TEMPERATURE: 74 F | SYSTOLIC BLOOD PRESSURE: 110 MMHG | WEIGHT: 148 LBS | DIASTOLIC BLOOD PRESSURE: 70 MMHG | BODY MASS INDEX: 26.22 KG/M2

## 2024-07-24 DIAGNOSIS — F33.1 MAJOR DEPRESSIVE DISORDER, RECURRENT EPISODE, MODERATE (HCC): ICD-10-CM

## 2024-07-24 DIAGNOSIS — F41.0 GENERALIZED ANXIETY DISORDER WITH PANIC ATTACKS: ICD-10-CM

## 2024-07-24 DIAGNOSIS — F40.10 SOCIAL ANXIETY DISORDER: Primary | ICD-10-CM

## 2024-07-24 DIAGNOSIS — F41.1 GENERALIZED ANXIETY DISORDER WITH PANIC ATTACKS: ICD-10-CM

## 2024-07-24 DIAGNOSIS — F43.23 ADJUSTMENT REACTION WITH ANXIETY AND DEPRESSION: ICD-10-CM

## 2024-07-24 PROCEDURE — 99214 OFFICE O/P EST MOD 30 MIN: CPT | Performed by: STUDENT IN AN ORGANIZED HEALTH CARE EDUCATION/TRAINING PROGRAM

## 2024-07-24 ASSESSMENT — ENCOUNTER SYMPTOMS
EYES NEGATIVE: 1
GASTROINTESTINAL NEGATIVE: 1
RESPIRATORY NEGATIVE: 1
ALLERGIC/IMMUNOLOGIC NEGATIVE: 1

## 2024-07-24 NOTE — PATIENT INSTRUCTIONS
Considerations for sleep modification:  1) Consider increasing your nighttime melatonin up to 10mg nightly, taken about 30-45 minutes before you're anticipating getting to sleep  2) Consider putting a pad of paper and a pen by the bedside in case you have some anxious thoughts.  Instead of trying to work through them in the middle of the night, write them down and plan on looking through that in the AM  3) If you need to escalate this regimen, consider adding 25mg of trazodone to the regimen at the same time as the melatonin.    Additionally, would strongly encourage you to look into finding a psychotherapist.  I would look at [facility] for a therapist or a psychiatrist in the future.  Allows you to filter your results based on your area code as well as your price point and insurance.

## 2024-11-14 ENCOUNTER — HOSPITAL ENCOUNTER (OUTPATIENT)
Dept: GENERAL RADIOLOGY | Age: 54
Discharge: HOME | End: 2024-11-14
Payer: COMMERCIAL

## 2024-11-14 ENCOUNTER — OFFICE VISIT (OUTPATIENT)
Dept: INTERNAL MEDICINE CLINIC | Age: 54
End: 2024-11-14
Payer: COMMERCIAL

## 2024-11-14 VITALS
SYSTOLIC BLOOD PRESSURE: 124 MMHG | WEIGHT: 150.2 LBS | BODY MASS INDEX: 26.61 KG/M2 | HEART RATE: 81 BPM | OXYGEN SATURATION: 96 % | DIASTOLIC BLOOD PRESSURE: 86 MMHG

## 2024-11-14 DIAGNOSIS — Z12.5 SCREENING PSA (PROSTATE SPECIFIC ANTIGEN): ICD-10-CM

## 2024-11-14 DIAGNOSIS — Z00.00 ENCOUNTER FOR WELL ADULT EXAM WITHOUT ABNORMAL FINDINGS: Primary | ICD-10-CM

## 2024-11-14 DIAGNOSIS — D03.59 MELANOMA IN SITU OF TORSO EXCLUDING BREAST (HCC): ICD-10-CM

## 2024-11-14 DIAGNOSIS — M77.11 RIGHT TENNIS ELBOW: ICD-10-CM

## 2024-11-14 DIAGNOSIS — Z13.220 SCREENING, LIPID: ICD-10-CM

## 2024-11-14 DIAGNOSIS — F41.1 GENERALIZED ANXIETY DISORDER: ICD-10-CM

## 2024-11-14 PROCEDURE — 73080 X-RAY EXAM OF ELBOW: CPT

## 2024-11-14 PROCEDURE — 99396 PREV VISIT EST AGE 40-64: CPT | Performed by: INTERNAL MEDICINE

## 2024-11-14 SDOH — ECONOMIC STABILITY: FOOD INSECURITY: WITHIN THE PAST 12 MONTHS, YOU WORRIED THAT YOUR FOOD WOULD RUN OUT BEFORE YOU GOT MONEY TO BUY MORE.: NEVER TRUE

## 2024-11-14 SDOH — ECONOMIC STABILITY: FOOD INSECURITY: WITHIN THE PAST 12 MONTHS, THE FOOD YOU BOUGHT JUST DIDN'T LAST AND YOU DIDN'T HAVE MONEY TO GET MORE.: NEVER TRUE

## 2024-11-14 SDOH — ECONOMIC STABILITY: INCOME INSECURITY: HOW HARD IS IT FOR YOU TO PAY FOR THE VERY BASICS LIKE FOOD, HOUSING, MEDICAL CARE, AND HEATING?: NOT HARD AT ALL

## 2024-11-14 ASSESSMENT — PATIENT HEALTH QUESTIONNAIRE - PHQ9
3. TROUBLE FALLING OR STAYING ASLEEP: MORE THAN HALF THE DAYS
7. TROUBLE CONCENTRATING ON THINGS, SUCH AS READING THE NEWSPAPER OR WATCHING TELEVISION: NOT AT ALL
SUM OF ALL RESPONSES TO PHQ QUESTIONS 1-9: 10
6. FEELING BAD ABOUT YOURSELF - OR THAT YOU ARE A FAILURE OR HAVE LET YOURSELF OR YOUR FAMILY DOWN: MORE THAN HALF THE DAYS
SUM OF ALL RESPONSES TO PHQ QUESTIONS 1-9: 10
2. FEELING DOWN, DEPRESSED OR HOPELESS: MORE THAN HALF THE DAYS
SUM OF ALL RESPONSES TO PHQ9 QUESTIONS 1 & 2: 3
5. POOR APPETITE OR OVEREATING: NOT AT ALL
SUM OF ALL RESPONSES TO PHQ QUESTIONS 1-9: 10
4. FEELING TIRED OR HAVING LITTLE ENERGY: NEARLY EVERY DAY
9. THOUGHTS THAT YOU WOULD BE BETTER OFF DEAD, OR OF HURTING YOURSELF: NOT AT ALL
1. LITTLE INTEREST OR PLEASURE IN DOING THINGS: SEVERAL DAYS
SUM OF ALL RESPONSES TO PHQ QUESTIONS 1-9: 10
10. IF YOU CHECKED OFF ANY PROBLEMS, HOW DIFFICULT HAVE THESE PROBLEMS MADE IT FOR YOU TO DO YOUR WORK, TAKE CARE OF THINGS AT HOME, OR GET ALONG WITH OTHER PEOPLE: SOMEWHAT DIFFICULT

## 2024-11-14 ASSESSMENT — ANXIETY QUESTIONNAIRES
5. BEING SO RESTLESS THAT IT IS HARD TO SIT STILL: NOT AT ALL
3. WORRYING TOO MUCH ABOUT DIFFERENT THINGS: SEVERAL DAYS
GAD7 TOTAL SCORE: 9
6. BECOMING EASILY ANNOYED OR IRRITABLE: MORE THAN HALF THE DAYS
1. FEELING NERVOUS, ANXIOUS, OR ON EDGE: MORE THAN HALF THE DAYS
IF YOU CHECKED OFF ANY PROBLEMS ON THIS QUESTIONNAIRE, HOW DIFFICULT HAVE THESE PROBLEMS MADE IT FOR YOU TO DO YOUR WORK, TAKE CARE OF THINGS AT HOME, OR GET ALONG WITH OTHER PEOPLE: SOMEWHAT DIFFICULT
4. TROUBLE RELAXING: MORE THAN HALF THE DAYS
2. NOT BEING ABLE TO STOP OR CONTROL WORRYING: SEVERAL DAYS
7. FEELING AFRAID AS IF SOMETHING AWFUL MIGHT HAPPEN: SEVERAL DAYS

## 2024-11-14 NOTE — PATIENT INSTRUCTIONS
Ice and tennis elbow wrap  If not better, then orthopedics    Elbow xray  [address]    Labs    Calendar your day for a week, to see if there are changes in how you spend your time.  If not leading to change, then may want to change lexapro to cymbalta.       Well Visit, Ages 18 to 65: Care Instructions  Well visits can help you stay healthy. Your doctor has checked your overall health and may have suggested ways to take good care of yourself. Your doctor also may have recommended tests. You can help prevent illness with healthy eating, good sleep, vaccinations, regular exercise, and other steps.    Get the tests that you and your doctor decide on. Depending on your age and risks, examples might include screening for diabetes; hepatitis C; HIV; and cervical, breast, lung, and colon cancer. Screening helps find diseases before any symptoms appear.   Eat healthy foods. Choose fruits, vegetables, whole grains, lean protein, and low-fat dairy foods. Limit saturated fat and reduce salt.     Limit alcohol. Men should have no more than 2 drinks a day. Women should have no more than 1. For some people, no alcohol is the best choice.   Exercise. Get at least 30 minutes of exercise on most days of the week. Walking can be a good choice.     Reach and stay at your healthy weight. This will lower your risk for many health problems.   Take care of your mental health. Try to stay connected with friends, family, and community, and find ways to manage stress.     If you're feeling depressed or hopeless, talk to someone. A counselor can help. If you don't have a counselor, talk to your doctor.   Talk to your doctor if you think you may have a problem with alcohol or drug use. This includes prescription medicines, marijuana, and other drugs.     Avoid tobacco and nicotine: Don't smoke, vape, or chew. If you need help quitting, talk to your doctor.   Practice safer sex. Getting tested, using condoms or dental dams, and limiting

## 2024-11-14 NOTE — PROGRESS NOTES
Date    Allergic rhinitis     Anxiety     Arthritis     Cancer (HCC)     Melanoma on left side    Depression     Hyperlipidemia     Lumbosacral radiculopathy at L5     Melanoma in situ (HCC)     left flank  - [facility]    Prostate calculus     Urology group- Dr. Velez/Jesus - had mri 1/19 and \"scope\" in 9/18    Shingles 2015    left trunk    Trigeminal neuralgia of right side of face      Past Surgical History:   Procedure Laterality Date    COLONOSCOPY  05/2022    fu 10 yrs    CYSTOSCOPY  2018    HAND SURGERY Left 1993    left distal digit amputation     Family History   Problem Relation Age of Onset    Scoliosis Mother         pleural effusion    Hypertension Father         passed 2023    High Cholesterol Father     Stroke Father     Breast Cancer Sister     Diabetes type 2  Paternal Grandfather     Diabetes Paternal Grandfather      Social History     Tobacco Use    Smoking status: Never    Smokeless tobacco: Never   Vaping Use    Vaping status: Never Used   Substance Use Topics    Alcohol use: Yes     Alcohol/week: 2.0 standard drinks of alcohol     Types: 2 Cans of beer per week     Comment: 2 beers a week     Drug use: Never           Objective    Vital Signs  /86   Pulse 81   Wt 68.1 kg (150 lb 3.2 oz)   SpO2 96%   BMI 26.61 kg/m²     Wt Readings from Last 3 Encounters:   11/14/24 68.1 kg (150 lb 3.2 oz)   07/24/24 67.1 kg (148 lb)   05/30/24 67.7 kg (149 lb 3.2 oz)       Physical Exam  Vitals and nursing note reviewed.   Constitutional:       General: He is not in acute distress.     Appearance: He is well-developed.   HENT:      Head: Normocephalic and atraumatic.      Right Ear: External ear normal.      Left Ear: External ear normal.      Nose: Nose normal.   Eyes:      General: No scleral icterus.     Pupils: Pupils are equal, round, and reactive to light.   Neck:      Thyroid: No thyromegaly.   Cardiovascular:      Rate and Rhythm: Normal rate and regular rhythm.      Heart sounds: No

## 2024-11-15 LAB
ALBUMIN SERPL-MCNC: 4.6 G/DL (ref 3.4–5)
ALBUMIN/GLOB SERPL: 2.3 {RATIO} (ref 1.1–2.2)
ALP SERPL-CCNC: 60 U/L (ref 40–129)
ALT SERPL-CCNC: 52 U/L (ref 10–40)
ANION GAP SERPL CALCULATED.3IONS-SCNC: 9 MMOL/L (ref 3–16)
AST SERPL-CCNC: 35 U/L (ref 15–37)
BILIRUB SERPL-MCNC: 0.5 MG/DL (ref 0–1)
BUN SERPL-MCNC: 15 MG/DL (ref 7–20)
CALCIUM SERPL-MCNC: 9.4 MG/DL (ref 8.3–10.6)
CHLORIDE SERPL-SCNC: 104 MMOL/L (ref 99–110)
CHOLEST SERPL-MCNC: 243 MG/DL (ref 0–199)
CO2 SERPL-SCNC: 28 MMOL/L (ref 21–32)
CREAT SERPL-MCNC: 0.9 MG/DL (ref 0.9–1.3)
GFR SERPLBLD CREATININE-BSD FMLA CKD-EPI: >90 ML/MIN/{1.73_M2}
GLUCOSE SERPL-MCNC: 98 MG/DL (ref 70–99)
HDLC SERPL-MCNC: 43 MG/DL (ref 40–60)
LDLC SERPL CALC-MCNC: 169 MG/DL
POTASSIUM SERPL-SCNC: 4.4 MMOL/L (ref 3.5–5.1)
PROT SERPL-MCNC: 6.6 G/DL (ref 6.4–8.2)
PSA SERPL DL<=0.01 NG/ML-MCNC: 1.44 NG/ML (ref 0–4)
SODIUM SERPL-SCNC: 141 MMOL/L (ref 136–145)
TRIGL SERPL-MCNC: 156 MG/DL (ref 0–150)
VLDLC SERPL CALC-MCNC: 31 MG/DL

## 2024-12-04 ENCOUNTER — OFFICE VISIT (OUTPATIENT)
Dept: INTERNAL MEDICINE CLINIC | Age: 54
End: 2024-12-04

## 2024-12-04 ENCOUNTER — TELEPHONE (OUTPATIENT)
Dept: INTERNAL MEDICINE CLINIC | Age: 54
End: 2024-12-04

## 2024-12-04 VITALS
BODY MASS INDEX: 25.86 KG/M2 | DIASTOLIC BLOOD PRESSURE: 72 MMHG | OXYGEN SATURATION: 96 % | WEIGHT: 146 LBS | SYSTOLIC BLOOD PRESSURE: 110 MMHG | HEART RATE: 80 BPM | TEMPERATURE: 97.4 F

## 2024-12-04 DIAGNOSIS — J02.9 PHARYNGITIS, UNSPECIFIED ETIOLOGY: Primary | ICD-10-CM

## 2024-12-04 DIAGNOSIS — J06.9 ACUTE URI: ICD-10-CM

## 2024-12-04 LAB
INFLUENZA A ANTIBODY: NORMAL
INFLUENZA B ANTIBODY: NORMAL
S PYO AG THROAT QL: NORMAL

## 2024-12-04 NOTE — TELEPHONE ENCOUNTER
Patient is calling in for  in regards to current sx that started since Saturday. Per patient has a sore throat, fever( as of today 99.2) congestion  and aches.     Patient would like to be seen today, offered evisit and declined, offered tomorrow at 3 pm declined.     Please advise.

## 2024-12-04 NOTE — TELEPHONE ENCOUNTER
FYI  Pt is no scheduled for 1:15 pm for sx based w Dr. Guerra. Also pt is has taken a at home covid test waiting for the results.

## 2024-12-04 NOTE — PROGRESS NOTES
Iglesia Hayes (:  1970) is a 54 y.o. male, here for evaluation of the following chief complaint(s):    Cold Symptoms (Onset 2024 w/ fever 101.8/chills, cough, sore throat getting better. Home Covid test [NEG] this am.)      ASSESSMENT/PLAN:  1. Pharyngitis, unspecified etiology  -     POCT rapid strep A negative  -     POCT Influenza A/B negative  -     Culture, Throat pending  2. Acute URI  -     Culture, Throat  Tell patient to continue to treat symptomatically for probable viral illness as he is improving unless throat culture has findings that would suggest use of antibiotics.    Return if symptoms worsen or fail to improve.    SUBJECTIVE/OBJECTIVE:  HPI  Patient reports his symptoms started on .  They included fever to 1-1.8, cough, sore throat, muscle achiness.  Sore throat and fever have improved.  He continues to have nasal congestion.  It is clear mucus.  Home COVID test was negative.  Flu and strep test here today are negative.    Review of Systems    Past Medical History:   Diagnosis Date    Allergic rhinitis     Anxiety     Arthritis     Cancer (HCC)     Melanoma on left side    Depression     Hyperlipidemia     Lumbosacral radiculopathy at L5     Melanoma in situ (HCC)     left flank  - [facility]    Prostate calculus     Urology group- Dr. Velez/Jesus - had mri  and \"scope\" in     Shingles     left trunk    Trigeminal neuralgia of right side of face        Current Outpatient Medications   Medication Sig Dispense Refill    busPIRone (BUSPAR) 10 MG tablet Take 1 tablet by mouth 2 times daily 60 tablet 5    escitalopram (LEXAPRO) 20 MG tablet Take 1 tablet by mouth daily 30 tablet 5    traZODone (DESYREL) 50 MG tablet Take 1 tablet by mouth nightly as needed for Sleep (Patient taking differently: Take 0.5 tablets by mouth nightly as needed for Sleep As needed) 30 tablet 2     No current facility-administered medications for this visit.       Physical

## 2024-12-07 LAB — BACTERIA THROAT AEROBE CULT: NORMAL

## 2024-12-23 ENCOUNTER — TELEPHONE (OUTPATIENT)
Dept: INTERNAL MEDICINE CLINIC | Age: 54
End: 2024-12-23

## 2024-12-23 NOTE — TELEPHONE ENCOUNTER
Please advise home COVID test.  If wants to be seen today, will have to go to urgent care or overflow clinic since I have no openings.

## 2024-12-23 NOTE — TELEPHONE ENCOUNTER
REBA  Called pt and advised per Dr. Guerra:    Please advise home COVID test.  If wants to be seen today, will have to go to urgent care or overflow clinic since I have no openings.     Pt voiced understanding and will go ahead and take a covid test, also will keep his appt for tomorrow.

## 2024-12-23 NOTE — TELEPHONE ENCOUNTER
Pt called in for Dr. Guerra in regards to maybe getting in today. Pt was told to make a f/u visit if sx still persisted. Pt stated he was feeling better but he starting to feel bad again last Thursday. Still having some fatigue, little bit of sinus drainage, causing to cough, throat scratchy. Did schedule pt for tomorrow but seemed to one an appt today w Dr. Guerra. Please advise.     Pt callback# [phone]

## 2024-12-24 ENCOUNTER — OFFICE VISIT (OUTPATIENT)
Dept: INTERNAL MEDICINE CLINIC | Age: 54
End: 2024-12-24
Payer: COMMERCIAL

## 2024-12-24 VITALS
WEIGHT: 148 LBS | HEART RATE: 78 BPM | TEMPERATURE: 98.2 F | BODY MASS INDEX: 26.22 KG/M2 | SYSTOLIC BLOOD PRESSURE: 120 MMHG | DIASTOLIC BLOOD PRESSURE: 80 MMHG | OXYGEN SATURATION: 97 %

## 2024-12-24 DIAGNOSIS — J06.9 URI WITH COUGH AND CONGESTION: Primary | ICD-10-CM

## 2024-12-24 DIAGNOSIS — R07.81 RIB PAIN ON RIGHT SIDE: ICD-10-CM

## 2024-12-24 PROCEDURE — 99213 OFFICE O/P EST LOW 20 MIN: CPT | Performed by: INTERNAL MEDICINE

## 2024-12-24 RX ORDER — AZITHROMYCIN 250 MG/1
TABLET, FILM COATED ORAL
Qty: 6 TABLET | Refills: 0 | Status: SHIPPED | OUTPATIENT
Start: 2024-12-24 | End: 2025-01-03

## 2024-12-24 RX ORDER — BENZONATATE 200 MG/1
200 CAPSULE ORAL 3 TIMES DAILY PRN
Qty: 21 CAPSULE | Refills: 0 | Status: SHIPPED | OUTPATIENT
Start: 2024-12-24 | End: 2024-12-31

## 2024-12-24 NOTE — PATIENT INSTRUCTIONS
Otc ibuprofen 200 mg every 6-8 hours with food or can use topical diclofenac gel and topical lidocaine ointment-- do this for up to 3 days    Chest / rib xray if not feeling better  [id number]  Tessalon cough capsules    Flonase nasal spray otc

## 2024-12-24 NOTE — PROGRESS NOTES
Iglesia Hayes (:  1970) is a 54 y.o. male, here for evaluation of the following chief complaint(s):    Cold Symptoms (Pain when pushing on ribs on right side, sinus drainage more clear now, used to be more of a brown. Coughing. Took Robitussin DM cough medicine and had hives and felt itchy, has used previously and no reaction. Stated it has to do with the medications he is now using )      ASSESSMENT/PLAN:  1. URI with cough and congestion  -     azithromycin (ZITHROMAX) 250 MG tablet; 500mg on day 1 followed by 250mg on days 2 - 5, Disp-6 tablet, R-0Normal  -     benzonatate (TESSALON) 200 MG capsule; Take 1 capsule by mouth 3 times daily as needed for Cough, Disp-21 capsule, R-0Normal  Also advised Flonase for eustachian tube dysfunction and possible left otitis media.  2. Rib pain on right side  -     XR RIBS RIGHT INCLUDE CHEST (MIN 3 VIEWS); Future  Advised over-the-counter ibuprofen for up to 3 days.  If not improving, obtain rib x-ray.    Return if symptoms worsen or fail to improve.    SUBJECTIVE/OBJECTIVE:  HPI    Patient states he was recovering from prior infection and then started to feel worse again.  He has been coughing a lot but denies fever or shortness of breath.  He has sinus drainage.  Now it hurts when he pushes on his lower right anterior ribs.  Ears feel clogged.    Review of Systems    Past Medical History:   Diagnosis Date    Allergic rhinitis     Anxiety     Arthritis     Cancer (HCC)     Melanoma on left side    Depression     Hyperlipidemia     Lumbosacral radiculopathy at L5     Melanoma in situ (HCC)     left flank  - [facility]    Prostate calculus     Urology group- Dr. Velez/Jesus - had mri  and \"scope\" in     Shingles 2015    left trunk    Trigeminal neuralgia of right side of face        Current Outpatient Medications   Medication Sig Dispense Refill    azithromycin (ZITHROMAX) 250 MG tablet 500mg on day 1 followed by 250mg on days 2 - 5 6 tablet 0

## 2024-12-27 ENCOUNTER — HOSPITAL ENCOUNTER (OUTPATIENT)
Dept: GENERAL RADIOLOGY | Age: 54
Discharge: HOME | End: 2024-12-27
Payer: COMMERCIAL

## 2024-12-27 DIAGNOSIS — R07.81 RIB PAIN ON RIGHT SIDE: ICD-10-CM

## 2024-12-27 PROCEDURE — 71101 X-RAY EXAM UNILAT RIBS/CHEST: CPT

## 2025-01-13 SDOH — ECONOMIC STABILITY: INCOME INSECURITY: IN THE LAST 12 MONTHS, WAS THERE A TIME WHEN YOU WERE NOT ABLE TO PAY THE MORTGAGE OR RENT ON TIME?: NO

## 2025-01-13 SDOH — ECONOMIC STABILITY: TRANSPORTATION INSECURITY
IN THE PAST 12 MONTHS, HAS THE LACK OF TRANSPORTATION KEPT YOU FROM MEDICAL APPOINTMENTS OR FROM GETTING MEDICATIONS?: NO

## 2025-01-13 SDOH — ECONOMIC STABILITY: FOOD INSECURITY: WITHIN THE PAST 12 MONTHS, YOU WORRIED THAT YOUR FOOD WOULD RUN OUT BEFORE YOU GOT MONEY TO BUY MORE.: NEVER TRUE

## 2025-01-13 SDOH — ECONOMIC STABILITY: FOOD INSECURITY: WITHIN THE PAST 12 MONTHS, THE FOOD YOU BOUGHT JUST DIDN'T LAST AND YOU DIDN'T HAVE MONEY TO GET MORE.: NEVER TRUE

## 2025-01-13 ASSESSMENT — PATIENT HEALTH QUESTIONNAIRE - PHQ9
SUM OF ALL RESPONSES TO PHQ QUESTIONS 1-9: 15
8. MOVING OR SPEAKING SO SLOWLY THAT OTHER PEOPLE COULD HAVE NOTICED. OR THE OPPOSITE, BEING SO FIGETY OR RESTLESS THAT YOU HAVE BEEN MOVING AROUND A LOT MORE THAN USUAL: NOT AT ALL
9. THOUGHTS THAT YOU WOULD BE BETTER OFF DEAD, OR OF HURTING YOURSELF: NOT AT ALL
1. LITTLE INTEREST OR PLEASURE IN DOING THINGS: NEARLY EVERY DAY
4. FEELING TIRED OR HAVING LITTLE ENERGY: NEARLY EVERY DAY
5. POOR APPETITE OR OVEREATING: NOT AT ALL
9. THOUGHTS THAT YOU WOULD BE BETTER OFF DEAD, OR OF HURTING YOURSELF: NOT AT ALL
3. TROUBLE FALLING OR STAYING ASLEEP: NEARLY EVERY DAY
10. IF YOU CHECKED OFF ANY PROBLEMS, HOW DIFFICULT HAVE THESE PROBLEMS MADE IT FOR YOU TO DO YOUR WORK, TAKE CARE OF THINGS AT HOME, OR GET ALONG WITH OTHER PEOPLE: VERY DIFFICULT
SUM OF ALL RESPONSES TO PHQ9 QUESTIONS 1 & 2: 6
7. TROUBLE CONCENTRATING ON THINGS, SUCH AS READING THE NEWSPAPER OR WATCHING TELEVISION: SEVERAL DAYS
6. FEELING BAD ABOUT YOURSELF - OR THAT YOU ARE A FAILURE OR HAVE LET YOURSELF OR YOUR FAMILY DOWN: MORE THAN HALF THE DAYS
8. MOVING OR SPEAKING SO SLOWLY THAT OTHER PEOPLE COULD HAVE NOTICED. OR THE OPPOSITE - BEING SO FIDGETY OR RESTLESS THAT YOU HAVE BEEN MOVING AROUND A LOT MORE THAN USUAL: NOT AT ALL
10. IF YOU CHECKED OFF ANY PROBLEMS, HOW DIFFICULT HAVE THESE PROBLEMS MADE IT FOR YOU TO DO YOUR WORK, TAKE CARE OF THINGS AT HOME, OR GET ALONG WITH OTHER PEOPLE: VERY DIFFICULT
SUM OF ALL RESPONSES TO PHQ QUESTIONS 1-9: 15
1. LITTLE INTEREST OR PLEASURE IN DOING THINGS: NEARLY EVERY DAY
SUM OF ALL RESPONSES TO PHQ QUESTIONS 1-9: 15
4. FEELING TIRED OR HAVING LITTLE ENERGY: NEARLY EVERY DAY
2. FEELING DOWN, DEPRESSED OR HOPELESS: NEARLY EVERY DAY
7. TROUBLE CONCENTRATING ON THINGS, SUCH AS READING THE NEWSPAPER OR WATCHING TELEVISION: SEVERAL DAYS
2. FEELING DOWN, DEPRESSED OR HOPELESS: NEARLY EVERY DAY
SUM OF ALL RESPONSES TO PHQ QUESTIONS 1-9: 15
3. TROUBLE FALLING OR STAYING ASLEEP: NEARLY EVERY DAY
5. POOR APPETITE OR OVEREATING: NOT AT ALL
SUM OF ALL RESPONSES TO PHQ QUESTIONS 1-9: 15
6. FEELING BAD ABOUT YOURSELF - OR THAT YOU ARE A FAILURE OR HAVE LET YOURSELF OR YOUR FAMILY DOWN: MORE THAN HALF THE DAYS

## 2025-01-15 NOTE — PROGRESS NOTES
Iglesia Hayes (:  1970) is a 54 y.o. male, here for evaluation of the following chief complaint(s):    3 Month Follow-Up      ASSESSMENT/PLAN:  1. Major depressive disorder, recurrent episode, moderate (HCC)  Plan to add Abilify to enhance the effects of his antidepressant.  Will lower Lexapro dose to reduce risk of serotonin syndrome.  Advised patient to stay in close contact with me regarding how he is feeling and we will follow-up in 6 weeks.  2. Rib pain on right side  Suspect this is musculoskeletal from shoveling the driveway.  Advised Tylenol and/or limited Motrin over-the-counter as needed.  -     CBC with Auto Differential; Future  3. Melanoma in situ of torso excluding breast (HCC)  Sees dermatology annually  4. Generalized anxiety disorder with panic attacks  Patient will remain on Lexapro although at a lower dose, and buspirone.  -     escitalopram (LEXAPRO) 10 MG tablet; Take 1 tablet by mouth daily, Disp-30 tablet, R-1Normal    Return in about 6 weeks (around 2025).    SUBJECTIVE/OBJECTIVE:  HPI  Patient is here to follow-up anxiety and depression.  He states things are settling down as they have sold their parents house.  But his PHQ-9 score is higher at 15 with a feeling little interest or pleasure in doing things and feeling down depressed or hopeless nearly every day.  He is currently taking Lexapro 20 mg daily.  He rarely takes trazodone.  He remains on buspirone.  He continues to not really have much of a plan about his future.  He does not have a history of attention deficit disorder.  He continues to feel like his respiratory infection from last month is not fully resolved.  He has some tenderness in his ribs still but it had been doing better until he shoveled snow.  He still feels like he has some swollen glands in his neck.    He denies cough or shortness of breath.    We discussed his recent rib x-ray showing possible scoliosis.  We discussed this may be due to positional

## 2025-01-16 ENCOUNTER — OFFICE VISIT (OUTPATIENT)
Dept: INTERNAL MEDICINE CLINIC | Age: 55
End: 2025-01-16

## 2025-01-16 VITALS
HEART RATE: 79 BPM | SYSTOLIC BLOOD PRESSURE: 120 MMHG | OXYGEN SATURATION: 95 % | TEMPERATURE: 98.3 F | BODY MASS INDEX: 26.5 KG/M2 | DIASTOLIC BLOOD PRESSURE: 80 MMHG | WEIGHT: 149.6 LBS

## 2025-01-16 DIAGNOSIS — R07.81 RIB PAIN ON RIGHT SIDE: ICD-10-CM

## 2025-01-16 DIAGNOSIS — F41.0 GENERALIZED ANXIETY DISORDER WITH PANIC ATTACKS: ICD-10-CM

## 2025-01-16 DIAGNOSIS — D03.59 MELANOMA IN SITU OF TORSO EXCLUDING BREAST (HCC): ICD-10-CM

## 2025-01-16 DIAGNOSIS — F41.1 GENERALIZED ANXIETY DISORDER WITH PANIC ATTACKS: ICD-10-CM

## 2025-01-16 DIAGNOSIS — F33.1 MAJOR DEPRESSIVE DISORDER, RECURRENT EPISODE, MODERATE (HCC): Primary | ICD-10-CM

## 2025-01-16 RX ORDER — ESCITALOPRAM OXALATE 10 MG/1
10 TABLET ORAL DAILY
Qty: 30 TABLET | Refills: 1 | Status: SHIPPED | OUTPATIENT
Start: 2025-01-16 | End: 2025-03-17

## 2025-01-16 RX ORDER — FLUTICASONE PROPIONATE 50 MCG
1 SPRAY, SUSPENSION (ML) NASAL DAILY PRN
COMMUNITY

## 2025-01-16 RX ORDER — ARIPIPRAZOLE 5 MG/1
5 TABLET ORAL DAILY
Qty: 30 TABLET | Refills: 1 | Status: SHIPPED | OUTPATIENT
Start: 2025-01-16 | End: 2025-03-17

## 2025-01-16 NOTE — PATIENT INSTRUCTIONS
Advil or tylenol for shoveling related pain    Lower lexapro  Stop trazodone  Start abilify    Continue to try and establish with psychiatry

## 2025-02-17 DIAGNOSIS — E78.5 HYPERLIPIDEMIA, UNSPECIFIED HYPERLIPIDEMIA TYPE: ICD-10-CM

## 2025-02-17 DIAGNOSIS — R07.81 RIB PAIN ON RIGHT SIDE: ICD-10-CM

## 2025-02-17 LAB
ALBUMIN SERPL-MCNC: 4.5 G/DL (ref 3.4–5)
ALP SERPL-CCNC: 69 U/L (ref 40–129)
ALT SERPL-CCNC: 29 U/L (ref 10–40)
AST SERPL-CCNC: 27 U/L (ref 15–37)
BASOPHILS # BLD: 0 K/UL (ref 0–0.2)
BASOPHILS NFR BLD: 0.4 %
BILIRUB DIRECT SERPL-MCNC: 0.2 MG/DL (ref 0–0.3)
BILIRUB INDIRECT SERPL-MCNC: 0.3 MG/DL (ref 0–1)
BILIRUB SERPL-MCNC: 0.5 MG/DL (ref 0–1)
CHOLEST SERPL-MCNC: 215 MG/DL (ref 0–199)
DEPRECATED RDW RBC AUTO: 14.5 % (ref 12.4–15.4)
EOSINOPHIL # BLD: 0.2 K/UL (ref 0–0.6)
EOSINOPHIL NFR BLD: 2.6 %
HCT VFR BLD AUTO: 48.2 % (ref 40.5–52.5)
HDLC SERPL-MCNC: 42 MG/DL (ref 40–60)
HGB BLD-MCNC: 16.1 G/DL (ref 13.5–17.5)
LDLC SERPL CALC-MCNC: 155 MG/DL
LYMPHOCYTES # BLD: 2.8 K/UL (ref 1–5.1)
LYMPHOCYTES NFR BLD: 41.2 %
MCH RBC QN AUTO: 29.1 PG (ref 26–34)
MCHC RBC AUTO-ENTMCNC: 33.4 G/DL (ref 31–36)
MCV RBC AUTO: 86.9 FL (ref 80–100)
MONOCYTES # BLD: 0.5 K/UL (ref 0–1.3)
MONOCYTES NFR BLD: 6.8 %
NEUTROPHILS # BLD: 3.3 K/UL (ref 1.7–7.7)
NEUTROPHILS NFR BLD: 49 %
PLATELET # BLD AUTO: 238 K/UL (ref 135–450)
PMV BLD AUTO: 9.2 FL (ref 5–10.5)
PROT SERPL-MCNC: 6.8 G/DL (ref 6.4–8.2)
RBC # BLD AUTO: 5.55 M/UL (ref 4.2–5.9)
TRIGL SERPL-MCNC: 90 MG/DL (ref 0–150)
VLDLC SERPL CALC-MCNC: 18 MG/DL
WBC # BLD AUTO: 6.7 K/UL (ref 4–11)

## 2025-02-19 NOTE — PROGRESS NOTES
Iglesia Hayes (:  1970) is a 54 y.o. male, here for evaluation of the following chief complaint(s):    Follow-up      ASSESSMENT/PLAN:  1. Major depressive disorder, recurrent episode, moderate (HCC)  -     [facility] - Kvng Ledbetter DO, Psychiatry ([facility]), [facility]  -     [facility] - External Referral to Psychiatry  Patient is much improved with a PHQ 2 score of 2.  Will continue Abilify at the present dose since it appears effective.  Continue buspirone and Lexapro.  Will add treatment for constipation since he is having that side effect.  See patient instructions.  Also encouraged patient to establish with psychiatry.    For mild nasal congestion, advised nasal saline and Flonase.  He may be at the early phase of a \"cold\" so told him to let me know if symptoms progress.  Melanoma in situ of torso excluding breast (HCC)  Sees dermatology annually    Return in about 3 months (around 2025).    SUBJECTIVE/OBJECTIVE:  HPI  Patient is here to follow-up.  His depression is much improved since addition of Abilify.  He says he has a brighter outlook.  He is trying to exercise more.  He feels more energetic.  The only negative is increased constipation.  He has not really been taking anything for it.    He also notes some mild nasal congestion for a couple of days.  No other associated symptoms.    Review of Systems    Past Medical History:   Diagnosis Date    Allergic rhinitis     Anxiety     Arthritis     Cancer (HCC)     Melanoma on left side    Depression     Hyperlipidemia     Lumbosacral radiculopathy at L5     Melanoma in situ (HCC)     left flank  - [facility]    Prostate calculus     Urology group- Dr. Velez/Jesus - had mri  and \"scope\" in     Shingles     left trunk    Trigeminal neuralgia of right side of face        Current Outpatient Medications   Medication Sig Dispense Refill    fluticasone (FLONASE) 50 MCG/ACT nasal spray 1 spray by Each Nostril route daily as needed for

## 2025-02-27 ENCOUNTER — OFFICE VISIT (OUTPATIENT)
Dept: INTERNAL MEDICINE CLINIC | Age: 55
End: 2025-02-27
Payer: COMMERCIAL

## 2025-02-27 VITALS
DIASTOLIC BLOOD PRESSURE: 88 MMHG | BODY MASS INDEX: 26.11 KG/M2 | SYSTOLIC BLOOD PRESSURE: 126 MMHG | OXYGEN SATURATION: 97 % | HEART RATE: 82 BPM | WEIGHT: 147.4 LBS

## 2025-02-27 DIAGNOSIS — F33.1 MAJOR DEPRESSIVE DISORDER, RECURRENT EPISODE, MODERATE (HCC): Primary | ICD-10-CM

## 2025-02-27 PROCEDURE — 99213 OFFICE O/P EST LOW 20 MIN: CPT | Performed by: INTERNAL MEDICINE

## 2025-02-27 ASSESSMENT — PATIENT HEALTH QUESTIONNAIRE - PHQ9
SUM OF ALL RESPONSES TO PHQ QUESTIONS 1-9: 2
SUM OF ALL RESPONSES TO PHQ9 QUESTIONS 1 & 2: 2
2. FEELING DOWN, DEPRESSED OR HOPELESS: SEVERAL DAYS
SUM OF ALL RESPONSES TO PHQ QUESTIONS 1-9: 2
1. LITTLE INTEREST OR PLEASURE IN DOING THINGS: SEVERAL DAYS

## 2025-02-27 NOTE — PATIENT INSTRUCTIONS
Flonase and nasal saline. If not improving, let us know.    Prevnar at pharmacy or here after feeling better    For constipation:  Increase fluids  Add Colace stool softener  Add daily Metamucil  --  May also take occasional Miralax OTC    If established with psychiatry, see me in 6 months  If not established, see me in 3 months

## 2025-03-11 RX ORDER — ARIPIPRAZOLE 5 MG/1
5 TABLET ORAL DAILY
Qty: 30 TABLET | Refills: 1 | Status: SHIPPED | OUTPATIENT
Start: 2025-03-11

## 2025-04-09 ENCOUNTER — CLINICAL SUPPORT (OUTPATIENT)
Dept: INTERNAL MEDICINE CLINIC | Age: 55
End: 2025-04-09
Payer: COMMERCIAL

## 2025-04-09 DIAGNOSIS — Z23 NEED FOR PNEUMOCOCCAL 20-VALENT CONJUGATE VACCINATION: Primary | ICD-10-CM

## 2025-04-09 PROCEDURE — 90471 IMMUNIZATION ADMIN: CPT | Performed by: INTERNAL MEDICINE

## 2025-04-09 PROCEDURE — 90677 PCV20 VACCINE IM: CPT | Performed by: INTERNAL MEDICINE

## 2025-04-30 DIAGNOSIS — F41.1 GENERALIZED ANXIETY DISORDER WITH PANIC ATTACKS: ICD-10-CM

## 2025-04-30 DIAGNOSIS — F41.0 GENERALIZED ANXIETY DISORDER WITH PANIC ATTACKS: ICD-10-CM

## 2025-04-30 RX ORDER — ARIPIPRAZOLE 2 MG/1
2 TABLET ORAL DAILY
Qty: 30 TABLET | Refills: 0 | Status: SHIPPED | OUTPATIENT
Start: 2025-04-30 | End: 2025-05-30

## 2025-04-30 RX ORDER — BUSPIRONE HYDROCHLORIDE 10 MG/1
10 TABLET ORAL 2 TIMES DAILY
Qty: 180 TABLET | Refills: 1 | Status: SHIPPED | OUTPATIENT
Start: 2025-04-30 | End: 2025-05-29 | Stop reason: DRUGHIGH

## 2025-04-30 NOTE — TELEPHONE ENCOUNTER
Last appointment: 2/27/2025  Next appointment: 5/29/2025      Last refill:    (5/30/2024) by Teddy Ann MD

## 2025-05-28 NOTE — TELEPHONE ENCOUNTER
Last appointment: 2/27/2025  Next appointment: 5/29/2025        Last refill: (4/30/2025) by Ela Guerra MD

## 2025-05-28 NOTE — TELEPHONE ENCOUNTER
Please find out if patient has established yet with new psychiatrist and then send this back to me if still has not.

## 2025-05-29 ENCOUNTER — OFFICE VISIT (OUTPATIENT)
Dept: INTERNAL MEDICINE CLINIC | Age: 55
End: 2025-05-29
Payer: COMMERCIAL

## 2025-05-29 VITALS
DIASTOLIC BLOOD PRESSURE: 64 MMHG | WEIGHT: 149 LBS | HEART RATE: 78 BPM | SYSTOLIC BLOOD PRESSURE: 110 MMHG | BODY MASS INDEX: 26.39 KG/M2 | OXYGEN SATURATION: 97 %

## 2025-05-29 DIAGNOSIS — R53.83 FATIGUE, UNSPECIFIED TYPE: ICD-10-CM

## 2025-05-29 DIAGNOSIS — E78.5 HYPERLIPIDEMIA, UNSPECIFIED HYPERLIPIDEMIA TYPE: ICD-10-CM

## 2025-05-29 DIAGNOSIS — F41.1 GENERALIZED ANXIETY DISORDER WITH PANIC ATTACKS: Primary | ICD-10-CM

## 2025-05-29 DIAGNOSIS — F41.0 GENERALIZED ANXIETY DISORDER WITH PANIC ATTACKS: Primary | ICD-10-CM

## 2025-05-29 LAB
25(OH)D3 SERPL-MCNC: 20.5 NG/ML
TSH SERPL DL<=0.005 MIU/L-ACNC: 2.2 UIU/ML (ref 0.27–4.2)

## 2025-05-29 PROCEDURE — 99213 OFFICE O/P EST LOW 20 MIN: CPT | Performed by: INTERNAL MEDICINE

## 2025-05-29 RX ORDER — BUSPIRONE HYDROCHLORIDE 15 MG/1
15 TABLET ORAL 2 TIMES DAILY
COMMUNITY

## 2025-05-29 RX ORDER — ESCITALOPRAM OXALATE 20 MG/1
20 TABLET ORAL DAILY
Qty: 30 TABLET | Refills: 0 | Status: SHIPPED
Start: 2025-05-29 | End: 2025-06-28

## 2025-05-29 NOTE — PATIENT INSTRUCTIONS
Colon due 5/32    Labs 11/24    In the future may need to start statin or consider coronary calcium ct score    Snoring dayne and consider sleep study

## 2025-05-29 NOTE — TELEPHONE ENCOUNTER
Please set up 15 minute phone visit at 3 W or Th to discuss Brain MRI Quality 111:Pneumonia Vaccination Status For Older Adults: Pneumococcal Vaccination not Administered or Previously Received, Reason not Otherwise Specified Quality 130: Documentation Of Current Medications In The Medical Record: Current Medications Documented Quality 47: Advance Care Plan: Advance Care Planning discussed and documented; advance care plan or surrogate decision maker documented in the medical record. Quality 110: Preventive Care And Screening: Influenza Immunization: Influenza Immunization not Administered for Documented Reasons. Quality 226: Preventive Care And Screening: Tobacco Use: Screening And Cessation Intervention: Patient screened for tobacco use and is an ex/non-smoker Detail Level: Detailed no history of blood product transfusion

## 2025-05-29 NOTE — PROGRESS NOTES
Iglesia Hayes (:  1970) is a 54 y.o. male, here for evaluation of the following chief complaint(s):    3 Month Follow-Up (Had appt with psychiatrist yesterday)      ASSESSMENT/PLAN:  1. Generalized anxiety disorder with panic attacks  For now, patient is comfortable with the approach of the [facility] psychiatrist.  He is back on Lexapro 20 mg daily and buspirone 15 mg twice daily.  He has stopped Abilify.  -     escitalopram (LEXAPRO) 20 MG tablet; Take 1 tablet by mouth daily, Disp-30 tablet, R-0Adjust Sig  2. Fatigue, unspecified type  Check related labs.  Consider getting a snoring dayne and/or sleep study.  -     TSH; Future  -     [facility] - Jone Brown MD, Sleep Medicine [facility]  -     Vitamin D 25 Hydroxy; Future  3. Hyperlipidemia  Counseled on low-fat diet.    Return in about 5 months (around 10/29/2025) for [facility].    SUBJECTIVE/OBJECTIVE:  HPI  Patient is here to follow-up.  He finally got established with a new psychiatrist yesterday at [facility].  He saw  who recommended increasing his Lexapro back to 20 mg and his buspirone to 15 mg twice daily.  He also recommended stopping Abilify.  Patient states it helped his mood but caused fatigue.  The new psychiatrist also suggested patient do GeneSight testing.  For now, the patient feels comfortable with the plan.    Regarding fatigue, patient does not know if he snores or not.    Advised a snoring dayne.  Weight is stable.    Patient feels overall improved.  He states selling the house has gotten a lot of stress off of him.  He has restarted working in [employer] and had several small jobs already.    The 10-year ASCVD risk score (Zainab DK, et al., 2019) is: 4.6%    Values used to calculate the score:      Age: 54 years      Sex: Male      Is Non- : No      Diabetic: No      Tobacco smoker: No      Systolic Blood Pressure: 110 mmHg      Is BP treated: No      HDL Cholesterol: 42 mg/dL      Total

## 2025-05-30 RX ORDER — ARIPIPRAZOLE 2 MG/1
2 TABLET ORAL DAILY
Qty: 30 TABLET | Refills: 0 | OUTPATIENT
Start: 2025-05-30

## 2025-05-31 ENCOUNTER — RESULTS FOLLOW-UP (OUTPATIENT)
Dept: INTERNAL MEDICINE CLINIC | Age: 55
End: 2025-05-31